# Patient Record
Sex: MALE | Race: WHITE | URBAN - METROPOLITAN AREA
[De-identification: names, ages, dates, MRNs, and addresses within clinical notes are randomized per-mention and may not be internally consistent; named-entity substitution may affect disease eponyms.]

---

## 2021-03-15 ENCOUNTER — OFFICE VISIT (OUTPATIENT)
Dept: NEUROLOGY | Age: 61
End: 2021-03-15
Payer: MEDICAID

## 2021-03-15 VITALS
OXYGEN SATURATION: 98 % | HEART RATE: 86 BPM | RESPIRATION RATE: 16 BRPM | WEIGHT: 120 LBS | BODY MASS INDEX: 18.19 KG/M2 | SYSTOLIC BLOOD PRESSURE: 140 MMHG | DIASTOLIC BLOOD PRESSURE: 90 MMHG | HEIGHT: 68 IN

## 2021-03-15 DIAGNOSIS — G62.9 PERIPHERAL POLYNEUROPATHY: Primary | ICD-10-CM

## 2021-03-15 PROCEDURE — 99204 OFFICE O/P NEW MOD 45 MIN: CPT | Performed by: NURSE PRACTITIONER

## 2021-03-15 NOTE — PATIENT INSTRUCTIONS
PRESCRIPTION REFILL POLICY [facility] Statement to Patients April 1, 2014 In an effort to ensure the large volume of patient prescription refills is processed in the most efficient and expeditious manner, we are asking our patients to assist us by calling your Pharmacy for all prescription refills, this will include also your  Mail Order Pharmacy. The pharmacy will contact our office electronically to continue the refill process. Please do not wait until the last minute to call your pharmacy. We need at least 48 hours (2days) to fill prescriptions. We also encourage you to call your pharmacy before going to  your prescription to make sure it is ready. With regard to controlled substance prescription refill requests (narcotic refills) that need to be picked up at our office, we ask your cooperation by providing us with at least 72 hours (3days) notice that you will need a refill. We will not refill narcotic prescription refill requests after 4:00pm on any weekday, Monday through Thursday, or after 2:00pm on Fridays, or on the weekends. We encourage everyone to explore another way of getting your prescription refill request processed using [employer], our patient web portal through our electronic medical record system. [employer] is an efficient and effective way to communicate your medication request directly to the office and  downloadable as an miles on your smart phone . [employer] also features a review functionality that allows you to view your medication list as well as leave messages for your physician. Are you ready to get connected? If so please review the attatched instructions or speak to any of our staff to get you set up right away! Thank you so much for your cooperation. Should you have any questions please contact our Practice Administrator. The Physicians and Staff,  [facility]

## 2021-03-15 NOTE — PROGRESS NOTES
Neurology Follow-up  Diane Moreno NP      Visit Date: March 15, 2021    Patient: Mani Walters MRN: [id number]  SSN: [id number]    YOB: 1960  Age: 61 y.o. Sex: male      PCP: None      Previous records (physician notes, laboratory reports, and radiology reports) and imaging studies were reviewed and summarized. Subjective:     HPI: Mani Walters is a 61 y.o. male presenting with about 2 months of worsening left leg pain, tingling, numbness, and pins-and-needles in his feet. His right leg is fine. He cannot think of any inciting factor such as injury. He has been a  for 14 years and works on his knees a lot. Up until a month ago he was wearing tall boots that he said probably irritated it but it has not resolved since he has been wearing sneakers. He is sleeping in a chair as when he lies flat his foot goes numb \"asleep, like when you sleep on your arm and make it go to sleep\". He does report having remote right ulnar tendinitis that gave him similar symptoms down his arm and into his right hand. He is on light duty at work. His boss is supportive. He has no medical history and does not normally see doctors unless really needed. He is a smoker. Review of systems  Review of Systems   Constitutional: Negative. HENT: Negative. Eyes: Negative. Respiratory: Negative. Cardiovascular: Negative. Gastrointestinal: Negative. Genitourinary: Negative. Musculoskeletal: Positive for myalgias. Skin: Negative. Neurological: Positive for tingling, sensory change and focal weakness. Endo/Heme/Allergies: Negative. Psychiatric/Behavioral: Negative. History reviewed. No pertinent past medical history. History reviewed. No pertinent surgical history. History reviewed. No pertinent family history.   Social History     Tobacco Use    Smoking status: Current Every Day Smoker    Smokeless tobacco: Never Used   Substance Use Topics    Alcohol use: Yes           Not on File       Objective:      Visit Vitals  BP (!) 140/90   Pulse 86   Resp 16   Ht 5' 8\" (1.727 m)   Wt 120 lb (54.4 kg)   SpO2 98%   BMI 18.25 kg/m²        General: No distress. Well groomed  Heart: Regular rate and rhythm. Lungs: Unlabored  Musculoskeletal: Extremities w/o edema or muscle wasting   Skin: Warm dry skin  Psych: Good mood and affect    Neurologic Exam:  Mental Status:  Alert and oriented x 4. Normal processing  Coherent conversation  Able to follow directions without difficulty     Language:    Normal fluency and comprehension    Cranial Nerves:   Pupils equal, round and reactive to light. Visual fields intact. Extraocular movements intact w/o nystagmus      Facial sensation intact to LT. Facial activation symmetric. Hearing intact bilaterally. No dysarthria. Shoulder shrug 5/5 bilaterally. Motor:    Bulk and tone normal.      4+/5 left plantar and dorsiflexion. Strength otherwise full in all extremities. No pronator drift. No involuntary movements, resting or intention tremor    Sensation:    Painful to touch below the knee on the left. The slightest touch causes him to jump. Relative to right side, there is diminished sensation to pinprick and light touch entirely below the knee but worse along the peroneal distribution. Vibration left great toe 3 to 4 seconds. 14 seconds on the right. Sole of left foot diminished to pinprick and light touch compared to right foot, whereas on dorsal surface much more diminished. Coordination & Gait: No ataxia with finger to nose. No Romberg. Antalgic gait favoring left foot. MMSE  No flowsheet data found.      PHQ  3 most recent PHQ Screens 3/15/2021   Little interest or pleasure in doing things Not at all   Feeling down, depressed, irritable, or hopeless Not at all   Total Score PHQ 2 0       No results found for: WBC, HCT, HGB, PLT, HCTEXT, HGBEXT, PLTEXT, HCTEXT, HGBEXT, PLTEXT    No results found for: NA, K, CL, CO2, GLU, BUN, CREA, CA    No components found for: TROPQUANT,  SGOT,  GPT,  ALT,  AP,  TBIL,  TBILI,  TP,  ALB,  GLOB,  GGT,  AML,  AMYP,  LPSE,  HLPSE    No results found for: MERCEDES    No results found for: HBA1C, HGBE8, BIY9UOQY, ORM3FMJR, UOX3RVGP     No results found for: B12LT, FOL, RBCF    No results found for: MERCEDES, ANARX, ANAIGG, XBANA    No results found for: CHOL, CHOLPOCT, CHOLX, CHLST, CHOLV, HDL, HDLPOC, HDLP, LDL, LDLCPOC, LDLC, DLDLP, VLDLC, VLDL, TGLX, TRIGL, TRIGP, TGLPOCT, CHHD, CHHDX    XR Results   No results found for this or any previous visit. CT Results:  No results found for this or any previous visit. MRI Results:  No results found for this or any previous visit. VAS/US Results (maximum last 3): No results found for this or any previous visit. TTE         EKG  No results found for this or any previous visit. Follow-up and Dispositions    · Return if symptoms worsen or fail to improve. Assessment/Plan:     Gui Fay is a 61 y.o. male presenting with about 2 months of worsening left leg pain, tingling, numbness, and pins-and-needles in his feet. His right leg is fine. He cannot think of any inciting factor such as injury. He works on his knees a lot as a . He was wearing tall boots until about a month ago that he said probably irritated it but it has not resolved since he has been wearing sneakers. He is sleeping in a chair as when he lies flat his foot goes numb. He does report having remote right ulnar tendinitis that gave him similar symptoms down his arm and into his right hand. He has no medical history and does not normally see doctors unless really needed. He is a smoker. Patient does not complain of back pain, no pain behind the knee into the thigh to palpation. No left thigh symptoms.  Laying down flat and having numb foot is more indicative of the spinal etiology but given that all symptoms are below the knee it is most likely a repetitive injury to the distal sciatic nerve behind the knee with predominance of symptoms in the peroneal nerve distribution, given present but lesser deficits in other distributions. Other etiologies will be explored if necessary pending NCS/EMG results    NCS/EMG with Dr. Nydia Rivera pending results  - Follow up soon after EMG/NCS for results and plan  - Instructed to call with questions or concerns. Visit Diagnoses    ICD-10-CM ICD-9-CM    1.  Peripheral polyneuropathy  G62.9 356.9     Unilater left           Signed By: Haylee Vogel NP     March 15, 2021 7:53 AM

## 2021-03-15 NOTE — PROGRESS NOTES
Mr. Rosanne Fragoso presents as a new patient for evaluation of left foot pain for the past couple months.

## 2021-03-16 ENCOUNTER — TELEPHONE (OUTPATIENT)
Dept: NEUROLOGY | Age: 61
End: 2021-03-16

## 2021-03-25 ENCOUNTER — OFFICE VISIT (OUTPATIENT)
Dept: NEUROLOGY | Age: 61
End: 2021-03-25
Payer: MEDICAID

## 2021-03-25 VITALS
HEIGHT: 68 IN | DIASTOLIC BLOOD PRESSURE: 88 MMHG | OXYGEN SATURATION: 98 % | SYSTOLIC BLOOD PRESSURE: 138 MMHG | BODY MASS INDEX: 18.19 KG/M2 | HEART RATE: 78 BPM | WEIGHT: 120 LBS | RESPIRATION RATE: 16 BRPM

## 2021-03-25 DIAGNOSIS — G62.9 PERIPHERAL POLYNEUROPATHY: ICD-10-CM

## 2021-03-25 DIAGNOSIS — G57.02 NEUROPATHY OF LEFT SCIATIC NERVE: ICD-10-CM

## 2021-03-25 PROCEDURE — 95910 NRV CNDJ TEST 7-8 STUDIES: CPT | Performed by: PSYCHIATRY & NEUROLOGY

## 2021-03-25 PROCEDURE — 95886 MUSC TEST DONE W/N TEST COMP: CPT | Performed by: PSYCHIATRY & NEUROLOGY

## 2021-03-25 NOTE — PROGRESS NOTES
[address] [facility]  [address]  Phone [phone] Fax [phone]  Test Date:  3/25/2021    Patient: Mookie Morley : 1960 Physician: Mindy Woodard MD   Sex: Male Height: 5' 8\" Ref Phys: Skyler Jose, NP   ID#: [id number] Weight: 120 lbs. Technician: Ana Sethi     Patient Complaints:  LLE    Patient History / Exam:  80-year-old male who is being evaluated for numbness, tingling and increased sensitivity in both lower extremities, left worse than right. He started noticing the symptoms about 1-2 months ago. Has had some pain off and on in his feet prior to that. No back pain. Denies any weakness or any significant balance issues    On exam: Alert fully oriented. Cranial nerves II through XII intact with muscle tone and bulk normal for strength normal in all extremities. DTRs 2/2. Knee jerks somewhat brisk. Toes downgoing. Sensation impaired to light touch pinprick in both lower extremities below the knees, left worse than right. Vibration sense also impaired at the ankles bilaterally. Gait steady. In the ED      NCV & EMG Findings:  Evaluation of the left peroneal motor nerve showed no response (B Fib), prolonged distal onset latency (9.9 ms), and reduced amplitude (0.3 mV). The right peroneal motor nerve showed prolonged distal onset latency (6.6 ms) and decreased conduction velocity (B Fib-Ankle, 37 m/s). The left Sup Peroneal sensory nerve showed no response (14 cm). The left sural sensory nerve showed no response (Calf). All remaining nerves  were within normal limits. All left vs. right side differences were within normal limits. All F Wave latencies were within normal limits. All F Wave left vs. right side latency differences were within normal limits. All examined muscles (as indicated in the following table) showed no evidence of electrical instability. Impression:    Left superficial peroneal and sural sensory responses were absent. Left peroneal motor response was of low amplitude and tibial motor response was nearly 50% lower amplitude compared to the right. Right peroneal motor response was mildly prolonged but tibial motor, superficial peroneal and right sural sensory responses were within normal limits. Concentric needle EMG of selected muscles of both lower extremities was unremarkable. Complex findings. The nerve conduction parameters show asymmetric involvement of the motor and sensory branches of sciatic nerve in the distal left lower extremity. However, the needle examination does not show any acute or chronic denervation in sciatic innervated muscles on the left. This set of findings is unusual for a primary sciatic neuropathy and also for a large fiber peripheral neuropathic process. Given his recent onset symptoms, sciatic neuropathy proximal to the knee would be difficult to exclude.   Further imaging in this regard may be considered and a repeat EMG/NCV and 4 to 6 weeks may provide additional detail.      ___________________________  Ko Parra MD        Nerve Conduction Studies  Anti Sensory Summary Table     Stim Site NR Peak (ms) Norm Peak (ms) P-T Amp (µV) Norm P-T Amp Onset (ms) Site1 Site2 Delta-P (ms) Dist (cm) Seb (m/s) Norm Seb (m/s)   Left Sup Peroneal Anti Sensory (Ant Lat Mall)  30°C   14 cm NR  <4.4  >5.0  14 cm Ant Lat Mall  14.0  >32   Right Sup Peroneal Anti Sensory (Ant Lat Mall)  30°C   14 cm    3.8 <4.4 6.8 >5.0 3.0 14 cm Ant Lat Mall 3.8 14.0 37 >32   Site 2    3.8  6.2  2.8         Left Sural Anti Sensory (Lat Mall)  30°C   Calf NR  <4.0  >5.0  Calf Lat Mall  14.0  >35   Right Sural Anti Sensory (Lat Mall)  30°C   Calf    3.8 <4.0 10.7 >5.0 3.3 Calf Lat Mall 3.8 14.0 37 >35   Site 2    3.9  6.2  3.3           Motor Summary Table     Stim Site NR Onset (ms) Norm Onset (ms) O-P Amp (mV) Norm O-P Amp Site1 Site2 Delta-0 (ms) Dist (cm) Seb (m/s) Norm Seb (m/s)   Left Peroneal Motor (Ext Dig Brev)  30°C   Ankle    9.9 <6.1 0.3 >2.5 B Fib Ankle  30.0  >38   B Fib NR     Poplt B Fib  10.0  >40   Poplt    17.6  0.2          Right Peroneal Motor (Ext Dig Brev)  30°C   Ankle    6.6 <6.1 3.1 >2.5 B Fib Ankle 8.1 30.0 37 >38   B Fib    14.7  2.0  Poplt B Fib 2.1 10.0 48 >40   Poplt    16.8  2.0          Left Tibial Motor (Abd Ellis Brev)  30°C   Ankle    4.0 <6.1 4.7 >3.0 Knee Ankle 10.8 40.0 37 >35   Knee    14.8  3.4          Right Tibial Motor (Abd Ellis Brev)  30°C   Ankle    3.4 <6.1 8.6 >3.0 Knee Ankle 10.0 40.0 40 >35   Knee    13.4  5.8            F Wave Studies     NR F-Lat (ms) Lat Norm (ms) L-R F-Lat (ms) L-R Lat Norm   Left Tibial (Mrkrs) (Abd Hallucis)  30°C      58.49 <61 0.00 <5.7   Right Tibial (Mrkrs) (Abd Hallucis)  30°C      58.49 <61 0.00 <5.7     EMG     Side Muscle Nerve Root Ins Act Fibs Psw Amp Dur Poly Recrt Int Pat Comment   Left AntTibialis Dp Br Peronel L4-5 Nml Nml Nml Nml Nml 0 Nml Nml    Left Peroneus Long Sup Br Peronel L5-S1 Nml Nml Nml Nml Nml 0 Nml Nml    Left Gastroc Tibial S1-2 Nml Nml Nml Nml Nml 0 Nml Nml    Left Flex Dig Long Tibial L5-S2 Nml Nml Nml Nml Nml 0 Nml Nml    Left VastusLat Femoral L2-4 Nml Nml Nml Nml Nml 0 Nml Nml    Left Lumbo Parasp Low Rami L5-S1 Nml Nml Nml         Right AntTibialis Dp Br Peronel L4-5 Nml Nml Nml Nml Nml 0 Nml Nml    Right Peroneus Long Sup Br Peronel L5-S1 Nml Nml Nml Nml Nml 0 Nml Nml    Right Gastroc Tibial S1-2 Nml Nml Nml Nml Nml 0 Nml Nml    Right Flex Dig Long Tibial L5-S2 Nml Nml Nml Nml Nml 0 Nml Nml    Right VastusLat Femoral L2-4 Nml Nml Nml Nml Nml 0 Nml Nml          Waveforms:

## 2021-03-30 ENCOUNTER — TELEPHONE (OUTPATIENT)
Dept: NEUROLOGY | Age: 61
End: 2021-03-30

## 2021-03-30 DIAGNOSIS — G57.02 NEUROPATHY OF LEFT SCIATIC NERVE: Primary | ICD-10-CM

## 2021-03-30 DIAGNOSIS — G62.9 PERIPHERAL POLYNEUROPATHY: ICD-10-CM

## 2021-03-30 NOTE — TELEPHONE ENCOUNTER
----- Message from Mookie Smith sent at 3/30/2021  2:07 PM EDT -----  Regarding: CLAUDIA Molina/Telephone  Patient return call    Caller's first and last name and relationship (if not the patient): N/A      Best contact number(s): [phone]      Whose call is being returned: N/A      Details to clarify the request: Pt is returning a phone call he believes to be regarding scheduling an appt      Mookie Smith

## 2021-03-30 NOTE — TELEPHONE ENCOUNTER
Spoke with the patient and offered him the number to central scheduling. He stated he was not sure if that's who had called him but he will call the office back if he continue to have issues.

## 2021-03-30 NOTE — PROGRESS NOTES
EMG results reviewed. This \"encounter\" to order MRI left thigh to assess for sciatic nerve injury/ compression/ lesion etc. Left VM on pt's mobile that I want to review EMG results and that I want to get MRI. Gave him office phone number. I'll try him again later.   Lit Silva NP  03/30/21  10:22 AM

## 2021-04-02 ENCOUNTER — TELEPHONE (OUTPATIENT)
Dept: NEUROLOGY | Age: 61
End: 2021-04-02

## 2021-04-02 NOTE — TELEPHONE ENCOUNTER
Call and left message confirming our wish to get MRI to evaluate sciatic nerve and to schedule appointment for that and pending results go from there.   Tomasa Guallpa NP  04/02/21  9:54 AM

## 2021-04-06 RX ORDER — GABAPENTIN 100 MG/1
CAPSULE ORAL
Qty: 120 CAP | Refills: 1 | Status: ON HOLD | OUTPATIENT
Start: 2021-04-06 | End: 2022-01-01

## 2021-04-06 NOTE — TELEPHONE ENCOUNTER
Telephoned patient. Updated pt that MRI denied until other tests performed, primarily ultrasound. He reports that he is now getting pain in his right foot, similar to his left, but not as bad. The pain is quite severe. 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 Notified pt that he will get a call about scheduling the ultrasound. Discussed trying to control the nerve pain with medication. He was reluctant but explained that gabapentin is not a opioid. Explained that gabapentin is more specific to the type of nerve pain he has. He's will to give it a try. Rx for gabapentin 100/100/200. Discussed side effects including somnolence and dizziness. Recommended trying 1 capsule at night to see how he responds and to increase cautiously. Discussed starting daytime doses on the week-end or when not getting on a roof that day until he is sure side effects don't affect his balance or alertness. He agreed.   Maryan Diaz NP  04/06/21  6:59 PM

## 2021-05-12 ENCOUNTER — HOSPITAL ENCOUNTER (EMERGENCY)
Age: 61
Discharge: HOME | End: 2021-05-12
Attending: EMERGENCY MEDICINE
Payer: COMMERCIAL

## 2021-05-12 VITALS
TEMPERATURE: 97.4 F | RESPIRATION RATE: 14 BRPM | BODY MASS INDEX: 18.19 KG/M2 | DIASTOLIC BLOOD PRESSURE: 84 MMHG | WEIGHT: 120 LBS | HEIGHT: 68 IN | SYSTOLIC BLOOD PRESSURE: 152 MMHG | OXYGEN SATURATION: 99 % | HEART RATE: 89 BPM

## 2021-05-12 DIAGNOSIS — L08.9 TOE INFECTION: Primary | ICD-10-CM

## 2021-05-12 PROCEDURE — 99281 EMR DPT VST MAYX REQ PHY/QHP: CPT

## 2021-05-12 RX ORDER — CEPHALEXIN 500 MG/1
500 CAPSULE ORAL 2 TIMES DAILY
Qty: 14 CAP | Refills: 0 | Status: SHIPPED | OUTPATIENT
Start: 2021-05-12 | End: 2021-05-12 | Stop reason: SDUPTHER

## 2021-05-12 RX ORDER — CEPHALEXIN 500 MG/1
500 CAPSULE ORAL 2 TIMES DAILY
Qty: 14 CAP | Refills: 0 | Status: SHIPPED | OUTPATIENT
Start: 2021-05-12 | End: 2021-05-19

## 2021-05-12 NOTE — ED TRIAGE NOTES
Pt arrives ambulatory to triage w/ CC L great toe pain x 2 months. Pt states he think it is infected. Redness noted to great toe. Denies injury to the area. [facility] nerve damage of the toe.

## 2021-05-12 NOTE — ED PROVIDER NOTES
51-year-old male presents from home with complaint of toe infection. Patient reports swelling and redness to his left great toe. The symptoms started over the past couple of days. He reports he has been seen by neurology for nerve pain in his left foot for the past 4 months but that the swelling and redness are new. He has been doing Epson salt soaks for the past couple days and trying to squeeze out fluid but has not had any drainage. Denies any fever. No foot pain or swelling. Does not have diabetes. Has not taken any medications for the symptoms. No past medical history on file. No past surgical history on file. No family history on file. Social History     Socioeconomic History    Marital status: SINGLE     Spouse name: Not on file    Number of children: Not on file    Years of education: Not on file    Highest education level: Not on file   Occupational History    Not on file   Social Needs    Financial resource strain: Not on file    Food insecurity     Worry: Not on file     Inability: Not on file    Transportation needs     Medical: Not on file     Non-medical: Not on file   Tobacco Use    Smoking status: Current Every Day Smoker    Smokeless tobacco: Never Used   Substance and Sexual Activity    Alcohol use:  Yes    Drug use: Not on file    Sexual activity: Not on file   Lifestyle    Physical activity     Days per week: Not on file     Minutes per session: Not on file    Stress: Not on file   Relationships    Social connections     Talks on phone: Not on file     Gets together: Not on file     Attends Yarsanism service: Not on file     Active member of club or organization: Not on file     Attends meetings of clubs or organizations: Not on file     Relationship status: Not on file    Intimate partner violence     Fear of current or ex partner: Not on file     Emotionally abused: Not on file     Physically abused: Not on file     Forced sexual activity: Not on file   Other Topics Concern    Not on file   Social History Narrative    Not on file         ALLERGIES: Patient has no known allergies. Review of Systems   Constitutional: Negative for diaphoresis and fever. HENT: Negative for facial swelling. Eyes: Negative for visual disturbance. Respiratory: Negative for cough. Cardiovascular: Negative for chest pain. Gastrointestinal: Negative for abdominal pain. Genitourinary: Negative for dysuria. Musculoskeletal: Negative for joint swelling. Skin: Negative for rash. Neurological: Negative for headaches. Hematological: Negative for adenopathy. Psychiatric/Behavioral: Negative for suicidal ideas. Vitals:    05/12/21 0730   BP: (!) 152/84   Pulse: 89   Resp: 14   Temp: 97.4 °F (36.3 °C)   SpO2: 99%   Weight: 54.4 kg (120 lb)   Height: 5' 8\" (1.727 m)            Physical Exam  Vitals signs and nursing note reviewed. Constitutional:       General: He is not in acute distress. Appearance: He is well-developed. HENT:      Head: Normocephalic and atraumatic. Eyes:      Pupils: Pupils are equal, round, and reactive to light. Neck:      Musculoskeletal: Normal range of motion and neck supple. Cardiovascular:      Rate and Rhythm: Normal rate. Pulmonary:      Effort: Pulmonary effort is normal. No respiratory distress. Abdominal:      General: There is no distension. Musculoskeletal: Normal range of motion. Comments: His left great toe shows mild erythema and swelling. This is not involved the foot or ankle. He has no areas of drainage or fluctuance. He could have an ingrown toenail that is causing some of the symptoms. Skin:     General: Skin is warm and dry. Neurological:      Mental Status: He is alert and oriented to person, place, and time. MDM  Number of Diagnoses or Management Options  Toe infection  Diagnosis management comments: Assessment: Patient likely with an ingrown toenail of his left great toe. He was some early evidence of infection present. No fever or signs of systemic disease. Stable for discharge home. Will try oral antibiotics to continue Epson salt baths. He can follow-up with podiatry. Return to the ED with any new or worsening symptoms.          Procedures

## 2022-01-01 ENCOUNTER — APPOINTMENT (OUTPATIENT)
Dept: NON INVASIVE DIAGNOSTICS | Age: 62
DRG: 166 | End: 2022-01-01
Attending: INTERNAL MEDICINE
Payer: COMMERCIAL

## 2022-01-01 ENCOUNTER — HOSPITAL ENCOUNTER (OUTPATIENT)
Dept: GENERAL RADIOLOGY | Age: 62
Discharge: HOME | End: 2022-04-25
Payer: COMMERCIAL

## 2022-01-01 ENCOUNTER — ANESTHESIA EVENT (OUTPATIENT)
Dept: SURGERY | Age: 62
DRG: 253 | End: 2022-01-01
Payer: COMMERCIAL

## 2022-01-01 ENCOUNTER — HOSPITAL ENCOUNTER (INPATIENT)
Dept: RADIATION THERAPY | Age: 62
Discharge: HOME | DRG: 166 | End: 2022-09-02
Attending: HOSPITALIST
Payer: COMMERCIAL

## 2022-01-01 ENCOUNTER — HOSPITAL ENCOUNTER (INPATIENT)
Age: 62
LOS: 2 days | End: 2022-09-08
Attending: FAMILY MEDICINE | Admitting: FAMILY MEDICINE

## 2022-01-01 ENCOUNTER — ANESTHESIA (OUTPATIENT)
Dept: SURGERY | Age: 62
DRG: 253 | End: 2022-01-01
Payer: COMMERCIAL

## 2022-01-01 ENCOUNTER — HOSPITAL ENCOUNTER (INPATIENT)
Age: 62
LOS: 3 days | Discharge: HOME | DRG: 253 | End: 2022-05-01
Payer: COMMERCIAL

## 2022-01-01 ENCOUNTER — DOCUMENTATION ONLY (OUTPATIENT)
Dept: ONCOLOGY | Age: 62
End: 2022-01-01

## 2022-01-01 ENCOUNTER — APPOINTMENT (OUTPATIENT)
Dept: GENERAL RADIOLOGY | Age: 62
DRG: 166 | End: 2022-01-01
Attending: HOSPITALIST
Payer: COMMERCIAL

## 2022-01-01 ENCOUNTER — HOSPITAL ENCOUNTER (OUTPATIENT)
Dept: PREADMISSION TESTING | Age: 62
Discharge: HOME | End: 2022-04-25
Payer: COMMERCIAL

## 2022-01-01 ENCOUNTER — APPOINTMENT (OUTPATIENT)
Dept: GENERAL RADIOLOGY | Age: 62
DRG: 166 | End: 2022-01-01
Attending: INTERNAL MEDICINE
Payer: COMMERCIAL

## 2022-01-01 ENCOUNTER — APPOINTMENT (OUTPATIENT)
Dept: GENERAL RADIOLOGY | Age: 62
DRG: 166 | End: 2022-01-01
Attending: EMERGENCY MEDICINE
Payer: COMMERCIAL

## 2022-01-01 ENCOUNTER — TELEPHONE (OUTPATIENT)
Dept: ONCOLOGY | Age: 62
End: 2022-01-01

## 2022-01-01 ENCOUNTER — HOSPICE ADMISSION (OUTPATIENT)
Dept: HOSPICE | Facility: HOSPICE | Age: 62
End: 2022-01-01
Payer: COMMERCIAL

## 2022-01-01 ENCOUNTER — APPOINTMENT (OUTPATIENT)
Dept: CT IMAGING | Age: 62
DRG: 166 | End: 2022-01-01
Attending: EMERGENCY MEDICINE
Payer: COMMERCIAL

## 2022-01-01 ENCOUNTER — APPOINTMENT (OUTPATIENT)
Dept: GENERAL RADIOLOGY | Age: 62
DRG: 253 | End: 2022-01-01
Payer: COMMERCIAL

## 2022-01-01 ENCOUNTER — HOSPITAL ENCOUNTER (INPATIENT)
Age: 62
LOS: 10 days | Discharge: HOSPICE | DRG: 166 | End: 2022-09-06
Attending: EMERGENCY MEDICINE | Admitting: INTERNAL MEDICINE
Payer: COMMERCIAL

## 2022-01-01 ENCOUNTER — HOSPITAL ENCOUNTER (INPATIENT)
Dept: RADIATION THERAPY | Age: 62
Discharge: HOME | DRG: 166 | End: 2022-08-31
Attending: RADIOLOGY
Payer: COMMERCIAL

## 2022-01-01 ENCOUNTER — HOSPITAL ENCOUNTER (INPATIENT)
Dept: RADIATION THERAPY | Age: 62
Discharge: HOME | DRG: 166 | End: 2022-08-30
Attending: RADIOLOGY
Payer: COMMERCIAL

## 2022-01-01 ENCOUNTER — HOSPITAL ENCOUNTER (INPATIENT)
Dept: RADIATION THERAPY | Age: 62
Discharge: HOME | DRG: 166 | End: 2022-08-29
Payer: COMMERCIAL

## 2022-01-01 ENCOUNTER — HOSPITAL ENCOUNTER (INPATIENT)
Dept: CT IMAGING | Age: 62
Discharge: HOME | DRG: 166 | End: 2022-08-29
Attending: INTERNAL MEDICINE
Payer: COMMERCIAL

## 2022-01-01 ENCOUNTER — APPOINTMENT (OUTPATIENT)
Dept: CT IMAGING | Age: 62
DRG: 166 | End: 2022-01-01
Attending: RADIOLOGY
Payer: COMMERCIAL

## 2022-01-01 ENCOUNTER — APPOINTMENT (OUTPATIENT)
Dept: ULTRASOUND IMAGING | Age: 62
DRG: 166 | End: 2022-01-01
Attending: PHYSICIAN ASSISTANT
Payer: COMMERCIAL

## 2022-01-01 ENCOUNTER — HOSPITAL ENCOUNTER (INPATIENT)
Dept: RADIATION THERAPY | Age: 62
Discharge: HOME | DRG: 166 | End: 2022-09-01
Attending: PHYSICIAN ASSISTANT
Payer: COMMERCIAL

## 2022-01-01 ENCOUNTER — APPOINTMENT (OUTPATIENT)
Dept: ULTRASOUND IMAGING | Age: 62
DRG: 166 | End: 2022-01-01
Attending: INTERNAL MEDICINE
Payer: COMMERCIAL

## 2022-01-01 VITALS
DIASTOLIC BLOOD PRESSURE: 89 MMHG | WEIGHT: 115.52 LBS | SYSTOLIC BLOOD PRESSURE: 131 MMHG | HEART RATE: 96 BPM | BODY MASS INDEX: 17.51 KG/M2 | TEMPERATURE: 97.8 F | HEIGHT: 68 IN | OXYGEN SATURATION: 95 % | RESPIRATION RATE: 16 BRPM

## 2022-01-01 VITALS
BODY MASS INDEX: 15.46 KG/M2 | DIASTOLIC BLOOD PRESSURE: 65 MMHG | WEIGHT: 102 LBS | TEMPERATURE: 97.9 F | OXYGEN SATURATION: 96 % | HEART RATE: 113 BPM | HEIGHT: 68 IN | SYSTOLIC BLOOD PRESSURE: 103 MMHG | RESPIRATION RATE: 14 BRPM

## 2022-01-01 VITALS
HEART RATE: 78 BPM | DIASTOLIC BLOOD PRESSURE: 78 MMHG | HEIGHT: 68 IN | BODY MASS INDEX: 17.51 KG/M2 | TEMPERATURE: 98.4 F | WEIGHT: 115.52 LBS | SYSTOLIC BLOOD PRESSURE: 139 MMHG

## 2022-01-01 VITALS
RESPIRATION RATE: 24 BRPM | DIASTOLIC BLOOD PRESSURE: 58 MMHG | HEART RATE: 122 BPM | SYSTOLIC BLOOD PRESSURE: 90 MMHG | TEMPERATURE: 98.9 F | OXYGEN SATURATION: 77 %

## 2022-01-01 DIAGNOSIS — C79.51 BONE METASTASES (HCC): ICD-10-CM

## 2022-01-01 DIAGNOSIS — Z51.5 HOSPICE CARE: ICD-10-CM

## 2022-01-01 DIAGNOSIS — R06.02 SHORTNESS OF BREATH: ICD-10-CM

## 2022-01-01 DIAGNOSIS — C34.91 PRIMARY MALIGNANT NEOPLASM OF RIGHT LUNG METASTATIC TO OTHER SITE (HCC): ICD-10-CM

## 2022-01-01 DIAGNOSIS — I70.219 ATHEROSCLEROTIC PVD WITH INTERMITTENT CLAUDICATION (HCC): Primary | ICD-10-CM

## 2022-01-01 DIAGNOSIS — M89.8X9 BONE PAIN: ICD-10-CM

## 2022-01-01 DIAGNOSIS — C34.90 STAGE 4 MALIGNANT NEOPLASM OF LUNG, UNSPECIFIED LATERALITY (HCC): ICD-10-CM

## 2022-01-01 DIAGNOSIS — G89.3 CANCER ASSOCIATED PAIN: Primary | ICD-10-CM

## 2022-01-01 DIAGNOSIS — I21.4 NSTEMI (NON-ST ELEVATED MYOCARDIAL INFARCTION) (HCC): Primary | ICD-10-CM

## 2022-01-01 DIAGNOSIS — J90 PLEURAL EFFUSION: ICD-10-CM

## 2022-01-01 DIAGNOSIS — R91.8 LUNG MASS: ICD-10-CM

## 2022-01-01 DIAGNOSIS — R63.4 WEIGHT LOSS: ICD-10-CM

## 2022-01-01 DIAGNOSIS — Z71.89 ADVANCE CARE PLANNING: ICD-10-CM

## 2022-01-01 DIAGNOSIS — R45.1 RESTLESSNESS: ICD-10-CM

## 2022-01-01 DIAGNOSIS — M25.512 ACUTE PAIN OF LEFT SHOULDER: ICD-10-CM

## 2022-01-01 DIAGNOSIS — G89.3 CANCER ASSOCIATED PAIN: ICD-10-CM

## 2022-01-01 LAB
ABO + RH BLD: NORMAL
ALBUMIN SERPL-MCNC: 2.7 G/DL (ref 3.5–5)
ALBUMIN SERPL-MCNC: 3.7 G/DL (ref 3.5–5)
ALBUMIN/GLOB SERPL: 0.5 {RATIO} (ref 1.1–2.2)
ALBUMIN/GLOB SERPL: 1.1 {RATIO} (ref 1.1–2.2)
ALP SERPL-CCNC: 128 U/L (ref 45–117)
ALP SERPL-CCNC: 79 U/L (ref 45–117)
ALT SERPL-CCNC: 18 U/L (ref 12–78)
ALT SERPL-CCNC: 20 U/L (ref 12–78)
ANION GAP SERPL CALC-SCNC: 6 MMOL/L (ref 5–15)
ANION GAP SERPL CALC-SCNC: 7 MMOL/L (ref 5–15)
ANION GAP SERPL CALC-SCNC: 7 MMOL/L (ref 5–15)
ANION GAP SERPL CALC-SCNC: 8 MMOL/L (ref 5–15)
ANION GAP SERPL CALC-SCNC: 8 MMOL/L (ref 5–15)
APPEARANCE FLD: ABNORMAL
APTT PPP: 30 SEC (ref 22.1–31)
APTT PPP: 35.3 SEC (ref 22.1–31)
APTT PPP: 35.4 SEC (ref 22.1–31)
AST SERPL-CCNC: 29 U/L (ref 15–37)
AST SERPL-CCNC: 34 U/L (ref 15–37)
ATRIAL RATE: 108 BPM
ATRIAL RATE: 67 BPM
ATRIAL RATE: 96 BPM
BACTERIA SPEC CULT: NORMAL
BASOPHILS # BLD: 0 K/UL (ref 0–0.1)
BASOPHILS # BLD: 0 K/UL (ref 0–0.1)
BASOPHILS # BLD: 0.1 K/UL (ref 0–0.1)
BASOPHILS NFR BLD: 0 % (ref 0–1)
BASOPHILS NFR BLD: 1 % (ref 0–1)
BASOPHILS NFR BLD: 1 % (ref 0–1)
BILIRUB SERPL-MCNC: 0.4 MG/DL (ref 0.2–1)
BILIRUB SERPL-MCNC: 0.4 MG/DL (ref 0.2–1)
BLOOD GROUP ANTIBODIES SERPL: NORMAL
BUN SERPL-MCNC: 12 MG/DL (ref 6–20)
BUN SERPL-MCNC: 14 MG/DL (ref 6–20)
BUN/CREAT SERPL: 18 (ref 12–20)
BUN/CREAT SERPL: 21 (ref 12–20)
BUN/CREAT SERPL: 24 (ref 12–20)
BUN/CREAT SERPL: 25 (ref 12–20)
BUN/CREAT SERPL: 29 (ref 12–20)
CALCIUM SERPL-MCNC: 8.3 MG/DL (ref 8.5–10.1)
CALCIUM SERPL-MCNC: 8.5 MG/DL (ref 8.5–10.1)
CALCIUM SERPL-MCNC: 8.7 MG/DL (ref 8.5–10.1)
CALCIUM SERPL-MCNC: 9.2 MG/DL (ref 8.5–10.1)
CALCIUM SERPL-MCNC: 9.3 MG/DL (ref 8.5–10.1)
CALCULATED P AXIS, ECG09: 53 DEGREES
CALCULATED P AXIS, ECG09: 70 DEGREES
CALCULATED P AXIS, ECG09: 76 DEGREES
CALCULATED R AXIS, ECG10: 50 DEGREES
CALCULATED R AXIS, ECG10: 57 DEGREES
CALCULATED R AXIS, ECG10: 80 DEGREES
CALCULATED T AXIS, ECG11: 48 DEGREES
CALCULATED T AXIS, ECG11: 51 DEGREES
CALCULATED T AXIS, ECG11: 72 DEGREES
CHLORIDE SERPL-SCNC: 100 MMOL/L (ref 97–108)
CHLORIDE SERPL-SCNC: 102 MMOL/L (ref 97–108)
CHLORIDE SERPL-SCNC: 104 MMOL/L (ref 97–108)
CHLORIDE SERPL-SCNC: 98 MMOL/L (ref 97–108)
CHLORIDE SERPL-SCNC: 99 MMOL/L (ref 97–108)
CO2 SERPL-SCNC: 24 MMOL/L (ref 21–32)
CO2 SERPL-SCNC: 26 MMOL/L (ref 21–32)
CO2 SERPL-SCNC: 26 MMOL/L (ref 21–32)
CO2 SERPL-SCNC: 27 MMOL/L (ref 21–32)
CO2 SERPL-SCNC: 27 MMOL/L (ref 21–32)
COLOR FLD: ABNORMAL
COMMENT, HOLDF: NORMAL
COMMENT, HOLDF: NORMAL
COVID-19 RAPID TEST, COVR: ABNORMAL
COVID-19 RAPID TEST, COVR: NOT DETECTED
COVID-19 RAPID TEST, COVR: NOT DETECTED
CREAT SERPL-MCNC: 0.42 MG/DL (ref 0.7–1.3)
CREAT SERPL-MCNC: 0.48 MG/DL (ref 0.7–1.3)
CREAT SERPL-MCNC: 0.58 MG/DL (ref 0.7–1.3)
CREAT SERPL-MCNC: 0.59 MG/DL (ref 0.7–1.3)
CREAT SERPL-MCNC: 0.67 MG/DL (ref 0.7–1.3)
DIAGNOSIS, 93000: NORMAL
DIFFERENTIAL METHOD BLD: ABNORMAL
ECHO AO ROOT DIAM: 3.5 CM
ECHO AO ROOT INDEX: 2.27 CM/M2
ECHO AV AREA PEAK VELOCITY: 2.5 CM2
ECHO AV AREA/BSA PEAK VELOCITY: 1.6 CM2/M2
ECHO AV PEAK GRADIENT: 8 MMHG
ECHO AV PEAK VELOCITY: 1.5 M/S
ECHO AV VELOCITY RATIO: 0.73
ECHO EST RA PRESSURE: 3 MMHG
ECHO LA DIAMETER INDEX: 2.27 CM/M2
ECHO LA DIAMETER: 3.5 CM
ECHO LA TO AORTIC ROOT RATIO: 1
ECHO LV E' LATERAL VELOCITY: 13 CM/S
ECHO LV E' SEPTAL VELOCITY: 10 CM/S
ECHO LV FRACTIONAL SHORTENING: 31 % (ref 28–44)
ECHO LV INTERNAL DIMENSION DIASTOLE INDEX: 2.92 CM/M2
ECHO LV INTERNAL DIMENSION DIASTOLIC: 4.5 CM (ref 4.2–5.9)
ECHO LV INTERNAL DIMENSION SYSTOLIC INDEX: 2.01 CM/M2
ECHO LV INTERNAL DIMENSION SYSTOLIC: 3.1 CM
ECHO LV IVSD: 0.7 CM (ref 0.6–1)
ECHO LV MASS 2D: 113.6 G (ref 88–224)
ECHO LV MASS INDEX 2D: 73.8 G/M2 (ref 49–115)
ECHO LV POSTERIOR WALL DIASTOLIC: 0.9 CM (ref 0.6–1)
ECHO LV RELATIVE WALL THICKNESS RATIO: 0.4
ECHO LVOT AREA: 3.5 CM2
ECHO LVOT DIAM: 2.1 CM
ECHO LVOT PEAK GRADIENT: 5 MMHG
ECHO LVOT PEAK VELOCITY: 1.1 M/S
ECHO MV A VELOCITY: 0.57 M/S
ECHO MV AREA PHT: 2.2 CM2
ECHO MV E DECELERATION TIME (DT): 340.1 MS
ECHO MV E VELOCITY: 0.55 M/S
ECHO MV E/A RATIO: 0.96
ECHO MV E/E' LATERAL: 4.23
ECHO MV E/E' RATIO (AVERAGED): 4.87
ECHO MV E/E' SEPTAL: 5.5
ECHO MV PRESSURE HALF TIME (PHT): 98.6 MS
ECHO PV MAX VELOCITY: 0.7 M/S
ECHO PV PEAK GRADIENT: 2 MMHG
ECHO RIGHT VENTRICULAR SYSTOLIC PRESSURE (RVSP): 27 MMHG
ECHO RV FREE WALL PEAK S': 14 CM/S
ECHO RV INTERNAL DIMENSION: 3.9 CM
ECHO RV TAPSE: 1.7 CM (ref 1.7–?)
ECHO TV REGURGITANT MAX VELOCITY: 2.43 M/S
ECHO TV REGURGITANT PEAK GRADIENT: 24 MMHG
EOSINOPHIL # BLD: 0 K/UL (ref 0–0.4)
EOSINOPHIL # BLD: 0.1 K/UL (ref 0–0.4)
EOSINOPHIL # BLD: 0.1 K/UL (ref 0–0.4)
EOSINOPHIL # BLD: 0.2 K/UL (ref 0–0.4)
EOSINOPHIL NFR BLD: 0 % (ref 0–7)
EOSINOPHIL NFR BLD: 0 % (ref 0–7)
EOSINOPHIL NFR BLD: 1 % (ref 0–7)
EOSINOPHIL NFR BLD: 2 % (ref 0–7)
ERYTHROCYTE [DISTWIDTH] IN BLOOD BY AUTOMATED COUNT: 13.9 % (ref 11.5–14.5)
ERYTHROCYTE [DISTWIDTH] IN BLOOD BY AUTOMATED COUNT: 14.6 % (ref 11.5–14.5)
ERYTHROCYTE [DISTWIDTH] IN BLOOD BY AUTOMATED COUNT: 14.8 % (ref 11.5–14.5)
ERYTHROCYTE [DISTWIDTH] IN BLOOD BY AUTOMATED COUNT: 14.8 % (ref 11.5–14.5)
ERYTHROCYTE [DISTWIDTH] IN BLOOD BY AUTOMATED COUNT: 15.1 % (ref 11.5–14.5)
ERYTHROCYTE [DISTWIDTH] IN BLOOD BY AUTOMATED COUNT: 15.2 % (ref 11.5–14.5)
FERRITIN SERPL-MCNC: 1008 NG/ML (ref 26–388)
FOLATE SERPL-MCNC: 11.1 NG/ML (ref 5–21)
GLOBULIN SER CALC-MCNC: 3.4 G/DL (ref 2–4)
GLOBULIN SER CALC-MCNC: 5 G/DL (ref 2–4)
GLUCOSE BLD STRIP.AUTO-MCNC: 98 MG/DL (ref 65–117)
GLUCOSE FLD-MCNC: 70 MG/DL
GLUCOSE SERPL-MCNC: 120 MG/DL (ref 65–100)
GLUCOSE SERPL-MCNC: 84 MG/DL (ref 65–100)
GLUCOSE SERPL-MCNC: 95 MG/DL (ref 65–100)
GLUCOSE SERPL-MCNC: 98 MG/DL (ref 65–100)
GLUCOSE SERPL-MCNC: 99 MG/DL (ref 65–100)
GRAM STN SPEC: NORMAL
GRAM STN SPEC: NORMAL
HCT VFR BLD AUTO: 26.5 % (ref 36.6–50.3)
HCT VFR BLD AUTO: 27.1 % (ref 36.6–50.3)
HCT VFR BLD AUTO: 28.1 % (ref 36.6–50.3)
HCT VFR BLD AUTO: 29.4 % (ref 36.6–50.3)
HCT VFR BLD AUTO: 31.1 % (ref 36.6–50.3)
HCT VFR BLD AUTO: 44.8 % (ref 36.6–50.3)
HGB BLD-MCNC: 10.7 G/DL (ref 12.1–17)
HGB BLD-MCNC: 14.9 G/DL (ref 12.1–17)
HGB BLD-MCNC: 8.8 G/DL (ref 12.1–17)
HGB BLD-MCNC: 9.2 G/DL (ref 12.1–17)
HGB BLD-MCNC: 9.4 G/DL (ref 12.1–17)
HGB BLD-MCNC: 9.8 G/DL (ref 12.1–17)
IMM GRANULOCYTES # BLD AUTO: 0 K/UL (ref 0–0.04)
IMM GRANULOCYTES # BLD AUTO: 0.2 K/UL (ref 0–0.04)
IMM GRANULOCYTES NFR BLD AUTO: 0 % (ref 0–0.5)
IMM GRANULOCYTES NFR BLD AUTO: 1 % (ref 0–0.5)
INR PPP: 1 (ref 0.9–1.1)
IRON SATN MFR SERPL: 17 % (ref 20–50)
IRON SERPL-MCNC: 34 UG/DL (ref 35–150)
LDH SERPL L TO P-CCNC: 1851 IU/L
LDH SERPL L TO P-CCNC: 2005 U/L (ref 85–241)
LYMPHOCYTES # BLD: 1.9 K/UL (ref 0.8–3.5)
LYMPHOCYTES # BLD: 2 K/UL (ref 0.8–3.5)
LYMPHOCYTES # BLD: 2 K/UL (ref 0.8–3.5)
LYMPHOCYTES # BLD: 2.1 K/UL (ref 0.8–3.5)
LYMPHOCYTES # BLD: 2.1 K/UL (ref 0.8–3.5)
LYMPHOCYTES # BLD: 2.4 K/UL (ref 0.8–3.5)
LYMPHOCYTES NFR BLD: 10 % (ref 12–49)
LYMPHOCYTES NFR BLD: 12 % (ref 12–49)
LYMPHOCYTES NFR BLD: 13 % (ref 12–49)
LYMPHOCYTES NFR BLD: 13 % (ref 12–49)
LYMPHOCYTES NFR BLD: 14 % (ref 12–49)
LYMPHOCYTES NFR BLD: 21 % (ref 12–49)
LYMPHOCYTES NFR FLD: 60 %
MAGNESIUM SERPL-MCNC: 1.7 MG/DL (ref 1.6–2.4)
MAGNESIUM SERPL-MCNC: 1.9 MG/DL (ref 1.6–2.4)
MCH RBC QN AUTO: 32.1 PG (ref 26–34)
MCH RBC QN AUTO: 32.1 PG (ref 26–34)
MCH RBC QN AUTO: 32.3 PG (ref 26–34)
MCH RBC QN AUTO: 32.3 PG (ref 26–34)
MCH RBC QN AUTO: 32.7 PG (ref 26–34)
MCH RBC QN AUTO: 32.7 PG (ref 26–34)
MCHC RBC AUTO-ENTMCNC: 33.2 G/DL (ref 30–36.5)
MCHC RBC AUTO-ENTMCNC: 33.3 G/DL (ref 30–36.5)
MCHC RBC AUTO-ENTMCNC: 33.3 G/DL (ref 30–36.5)
MCHC RBC AUTO-ENTMCNC: 33.5 G/DL (ref 30–36.5)
MCHC RBC AUTO-ENTMCNC: 33.9 G/DL (ref 30–36.5)
MCHC RBC AUTO-ENTMCNC: 34.4 G/DL (ref 30–36.5)
MCV RBC AUTO: 94 FL (ref 80–99)
MCV RBC AUTO: 95.9 FL (ref 80–99)
MCV RBC AUTO: 96.4 FL (ref 80–99)
MCV RBC AUTO: 96.7 FL (ref 80–99)
MCV RBC AUTO: 97 FL (ref 80–99)
MCV RBC AUTO: 98.5 FL (ref 80–99)
MESOTHL CELL NFR FLD: 3 %
MONOCYTES # BLD: 0.9 K/UL (ref 0–1)
MONOCYTES # BLD: 1.1 K/UL (ref 0–1)
MONOCYTES # BLD: 1.2 K/UL (ref 0–1)
MONOCYTES # BLD: 1.3 K/UL (ref 0–1)
MONOCYTES # BLD: 1.4 K/UL (ref 0–1)
MONOCYTES # BLD: 1.4 K/UL (ref 0–1)
MONOCYTES NFR BLD: 10 % (ref 5–13)
MONOCYTES NFR BLD: 10 % (ref 5–13)
MONOCYTES NFR BLD: 5 % (ref 5–13)
MONOCYTES NFR BLD: 6 % (ref 5–13)
MONOCYTES NFR BLD: 9 % (ref 5–13)
MONOCYTES NFR BLD: 9 % (ref 5–13)
MONOS+MACROS NFR FLD: 10 %
NEUTROPHILS NFR FLD: 27 %
NEUTS SEG # BLD: 10.6 K/UL (ref 1.8–8)
NEUTS SEG # BLD: 10.9 K/UL (ref 1.8–8)
NEUTS SEG # BLD: 11.9 K/UL (ref 1.8–8)
NEUTS SEG # BLD: 14.8 K/UL (ref 1.8–8)
NEUTS SEG # BLD: 16 K/UL (ref 1.8–8)
NEUTS SEG # BLD: 7.5 K/UL (ref 1.8–8)
NEUTS SEG NFR BLD: 66 % (ref 32–75)
NEUTS SEG NFR BLD: 74 % (ref 32–75)
NEUTS SEG NFR BLD: 75 % (ref 32–75)
NEUTS SEG NFR BLD: 76 % (ref 32–75)
NEUTS SEG NFR BLD: 81 % (ref 32–75)
NEUTS SEG NFR BLD: 84 % (ref 32–75)
NRBC # BLD: 0 K/UL (ref 0–0.01)
NRBC BLD-RTO: 0 PER 100 WBC
NUC CELL # FLD: 1101 /CU MM
P-R INTERVAL, ECG05: 132 MS
P-R INTERVAL, ECG05: 134 MS
P-R INTERVAL, ECG05: 136 MS
PHOSPHATE SERPL-MCNC: 3.9 MG/DL (ref 2.6–4.7)
PHOSPHATE SERPL-MCNC: 3.9 MG/DL (ref 2.6–4.7)
PHOSPHATE SERPL-MCNC: 4.3 MG/DL (ref 2.6–4.7)
PLATELET # BLD AUTO: 340 K/UL (ref 150–400)
PLATELET # BLD AUTO: 406 K/UL (ref 150–400)
PLATELET # BLD AUTO: 408 K/UL (ref 150–400)
PLATELET # BLD AUTO: 415 K/UL (ref 150–400)
PLATELET # BLD AUTO: 469 K/UL (ref 150–400)
PLATELET # BLD AUTO: 493 K/UL (ref 150–400)
PMV BLD AUTO: 9 FL (ref 8.9–12.9)
PMV BLD AUTO: 9.6 FL (ref 8.9–12.9)
PMV BLD AUTO: 9.7 FL (ref 8.9–12.9)
PMV BLD AUTO: 9.8 FL (ref 8.9–12.9)
PMV BLD AUTO: 9.9 FL (ref 8.9–12.9)
PMV BLD AUTO: 9.9 FL (ref 8.9–12.9)
POTASSIUM SERPL-SCNC: 4 MMOL/L (ref 3.5–5.1)
POTASSIUM SERPL-SCNC: 4.2 MMOL/L (ref 3.5–5.1)
POTASSIUM SERPL-SCNC: 4.4 MMOL/L (ref 3.5–5.1)
POTASSIUM SERPL-SCNC: 4.5 MMOL/L (ref 3.5–5.1)
POTASSIUM SERPL-SCNC: 4.5 MMOL/L (ref 3.5–5.1)
PROCALCITONIN SERPL-MCNC: <0.05 NG/ML
PROT FLD-MCNC: 3.8 G/DL
PROT SERPL-MCNC: 6.4 G/DL (ref 6.4–8.2)
PROT SERPL-MCNC: 7.1 G/DL (ref 6.4–8.2)
PROT SERPL-MCNC: 7.7 G/DL (ref 6.4–8.2)
PROTHROMBIN TIME: 10.1 SEC (ref 9–11.1)
Q-T INTERVAL, ECG07: 336 MS
Q-T INTERVAL, ECG07: 364 MS
Q-T INTERVAL, ECG07: 390 MS
QRS DURATION, ECG06: 100 MS
QRS DURATION, ECG06: 108 MS
QRS DURATION, ECG06: 108 MS
QTC CALCULATION (BEZET), ECG08: 412 MS
QTC CALCULATION (BEZET), ECG08: 450 MS
QTC CALCULATION (BEZET), ECG08: 459 MS
RBC # BLD AUTO: 2.74 M/UL (ref 4.1–5.7)
RBC # BLD AUTO: 2.81 M/UL (ref 4.1–5.7)
RBC # BLD AUTO: 2.93 M/UL (ref 4.1–5.7)
RBC # BLD AUTO: 3.03 M/UL (ref 4.1–5.7)
RBC # BLD AUTO: 3.31 M/UL (ref 4.1–5.7)
RBC # BLD AUTO: 4.55 M/UL (ref 4.1–5.7)
RBC # FLD: >100 /CU MM
SAMPLES BEING HELD,HOLD: NORMAL
SAMPLES BEING HELD,HOLD: NORMAL
SERVICE CMNT-IMP: NORMAL
SERVICE CMNT-IMP: NORMAL
SODIUM SERPL-SCNC: 131 MMOL/L (ref 136–145)
SODIUM SERPL-SCNC: 132 MMOL/L (ref 136–145)
SODIUM SERPL-SCNC: 133 MMOL/L (ref 136–145)
SODIUM SERPL-SCNC: 136 MMOL/L (ref 136–145)
SODIUM SERPL-SCNC: 137 MMOL/L (ref 136–145)
SOURCE, COVRS: ABNORMAL
SOURCE, COVRS: NORMAL
SOURCE, COVRS: NORMAL
SPECIMEN EXP DATE BLD: NORMAL
SPECIMEN SOURCE FLD: ABNORMAL
SPECIMEN SOURCE FLD: NORMAL
SPECIMEN SOURCE FLD: NORMAL
SPECIMEN SOURCE: NORMAL
THERAPEUTIC RANGE,PTTT: ABNORMAL SECS (ref 58–77)
THERAPEUTIC RANGE,PTTT: ABNORMAL SECS (ref 58–77)
THERAPEUTIC RANGE,PTTT: NORMAL SECS (ref 58–77)
TIBC SERPL-MCNC: 202 UG/DL (ref 250–450)
TROPONIN-HIGH SENSITIVITY: 123 NG/L (ref 0–76)
TROPONIN-HIGH SENSITIVITY: 123 NG/L (ref 0–76)
TROPONIN-HIGH SENSITIVITY: 88 NG/L (ref 0–76)
TSH SERPL DL<=0.05 MIU/L-ACNC: 1.59 UIU/ML (ref 0.36–3.74)
VENTRICULAR RATE, ECG03: 108 BPM
VENTRICULAR RATE, ECG03: 67 BPM
VENTRICULAR RATE, ECG03: 96 BPM
VIT B12 SERPL-MCNC: 1525 PG/ML (ref 193–986)
WBC # BLD AUTO: 11.5 K/UL (ref 4.1–11.1)
WBC # BLD AUTO: 14.4 K/UL (ref 4.1–11.1)
WBC # BLD AUTO: 14.5 K/UL (ref 4.1–11.1)
WBC # BLD AUTO: 15.8 K/UL (ref 4.1–11.1)
WBC # BLD AUTO: 18.2 K/UL (ref 4.1–11.1)
WBC # BLD AUTO: 19 K/UL (ref 4.1–11.1)

## 2022-01-01 PROCEDURE — 74011250636 HC RX REV CODE- 250/636: Performed by: INTERNAL MEDICINE

## 2022-01-01 PROCEDURE — 97530 THERAPEUTIC ACTIVITIES: CPT

## 2022-01-01 PROCEDURE — 74011250636 HC RX REV CODE- 250/636: Performed by: FAMILY MEDICINE

## 2022-01-01 PROCEDURE — 65270000029 HC RM PRIVATE

## 2022-01-01 PROCEDURE — 83735 ASSAY OF MAGNESIUM: CPT

## 2022-01-01 PROCEDURE — 0656 HSPC GENERAL INPATIENT

## 2022-01-01 PROCEDURE — 74011000250 HC RX REV CODE- 250: Performed by: INTERNAL MEDICINE

## 2022-01-01 PROCEDURE — 93005 ELECTROCARDIOGRAM TRACING: CPT

## 2022-01-01 PROCEDURE — 74011250637 HC RX REV CODE- 250/637: Performed by: FAMILY MEDICINE

## 2022-01-01 PROCEDURE — 77030002987 HC SUT PROL J&J -B

## 2022-01-01 PROCEDURE — 99285 EMERGENCY DEPT VISIT HI MDM: CPT

## 2022-01-01 PROCEDURE — 88341 IMHCHEM/IMCYTCHM EA ADD ANTB: CPT

## 2022-01-01 PROCEDURE — 77387 GUIDANCE FOR RADJ TX DLVR: CPT

## 2022-01-01 PROCEDURE — 85025 COMPLETE CBC W/AUTO DIFF WBC: CPT

## 2022-01-01 PROCEDURE — 71275 CT ANGIOGRAPHY CHEST: CPT

## 2022-01-01 PROCEDURE — 74011250637 HC RX REV CODE- 250/637: Performed by: INTERNAL MEDICINE

## 2022-01-01 PROCEDURE — 36415 COLL VENOUS BLD VENIPUNCTURE: CPT

## 2022-01-01 PROCEDURE — 94760 N-INVAS EAR/PLS OXIMETRY 1: CPT

## 2022-01-01 PROCEDURE — 74011250637 HC RX REV CODE- 250/637

## 2022-01-01 PROCEDURE — 77412 RADIATION TX DELIVERY LVL 3: CPT

## 2022-01-01 PROCEDURE — 74011250636 HC RX REV CODE- 250/636: Performed by: PHYSICAL MEDICINE & REHABILITATION

## 2022-01-01 PROCEDURE — 74011000250 HC RX REV CODE- 250

## 2022-01-01 PROCEDURE — 74011250637 HC RX REV CODE- 250/637: Performed by: PHYSICAL MEDICINE & REHABILITATION

## 2022-01-01 PROCEDURE — 80048 BASIC METABOLIC PNL TOTAL CA: CPT

## 2022-01-01 PROCEDURE — 82607 VITAMIN B-12: CPT

## 2022-01-01 PROCEDURE — 71045 X-RAY EXAM CHEST 1 VIEW: CPT

## 2022-01-01 PROCEDURE — 84484 ASSAY OF TROPONIN QUANT: CPT

## 2022-01-01 PROCEDURE — 86900 BLOOD TYPING SEROLOGIC ABO: CPT

## 2022-01-01 PROCEDURE — 89050 BODY FLUID CELL COUNT: CPT

## 2022-01-01 PROCEDURE — 74011000250 HC RX REV CODE- 250: Performed by: ANESTHESIOLOGY

## 2022-01-01 PROCEDURE — 041L09M BYPASS LEFT FEMORAL ARTERY TO PERONEAL ARTERY WITH AUTOLOGOUS VENOUS TISSUE, OPEN APPROACH: ICD-10-PCS

## 2022-01-01 PROCEDURE — 99223 1ST HOSP IP/OBS HIGH 75: CPT | Performed by: PHYSICAL MEDICINE & REHABILITATION

## 2022-01-01 PROCEDURE — 94640 AIRWAY INHALATION TREATMENT: CPT

## 2022-01-01 PROCEDURE — 2709999900 HC NON-CHARGEABLE SUPPLY

## 2022-01-01 PROCEDURE — 97165 OT EVAL LOW COMPLEX 30 MIN: CPT

## 2022-01-01 PROCEDURE — 82746 ASSAY OF FOLIC ACID SERUM: CPT

## 2022-01-01 PROCEDURE — 88342 IMHCHEM/IMCYTCHM 1ST ANTB: CPT

## 2022-01-01 PROCEDURE — 74011000250 HC RX REV CODE- 250: Performed by: PHYSICIAN ASSISTANT

## 2022-01-01 PROCEDURE — 88305 TISSUE EXAM BY PATHOLOGIST: CPT

## 2022-01-01 PROCEDURE — 65270000046 HC RM TELEMETRY

## 2022-01-01 PROCEDURE — 99233 SBSQ HOSP IP/OBS HIGH 50: CPT | Performed by: PHYSICAL MEDICINE & REHABILITATION

## 2022-01-01 PROCEDURE — 76060000036 HC ANESTHESIA 2.5 TO 3 HR

## 2022-01-01 PROCEDURE — 0W9930Z DRAINAGE OF RIGHT PLEURAL CAVITY WITH DRAINAGE DEVICE, PERCUTANEOUS APPROACH: ICD-10-PCS | Performed by: RADIOLOGY

## 2022-01-01 PROCEDURE — 99233 SBSQ HOSP IP/OBS HIGH 50: CPT | Performed by: INTERNAL MEDICINE

## 2022-01-01 PROCEDURE — 71046 X-RAY EXAM CHEST 2 VIEWS: CPT

## 2022-01-01 PROCEDURE — 87635 SARS-COV-2 COVID-19 AMP PRB: CPT

## 2022-01-01 PROCEDURE — 74011250637 HC RX REV CODE- 250/637: Performed by: STUDENT IN AN ORGANIZED HEALTH CARE EDUCATION/TRAINING PROGRAM

## 2022-01-01 PROCEDURE — 74011250636 HC RX REV CODE- 250/636

## 2022-01-01 PROCEDURE — 88112 CYTOPATH CELL ENHANCE TECH: CPT

## 2022-01-01 PROCEDURE — 74011000250 HC RX REV CODE- 250: Performed by: NURSE ANESTHETIST, CERTIFIED REGISTERED

## 2022-01-01 PROCEDURE — 77010033678 HC OXYGEN DAILY

## 2022-01-01 PROCEDURE — 99232 SBSQ HOSP IP/OBS MODERATE 35: CPT | Performed by: INTERNAL MEDICINE

## 2022-01-01 PROCEDURE — 99222 1ST HOSP IP/OBS MODERATE 55: CPT | Performed by: INTERNAL MEDICINE

## 2022-01-01 PROCEDURE — 82945 GLUCOSE OTHER FLUID: CPT

## 2022-01-01 PROCEDURE — 97161 PT EVAL LOW COMPLEX 20 MIN: CPT

## 2022-01-01 PROCEDURE — G0299 HHS/HOSPICE OF RN EA 15 MIN: HCPCS

## 2022-01-01 PROCEDURE — 74011000258 HC RX REV CODE- 258: Performed by: INTERNAL MEDICINE

## 2022-01-01 PROCEDURE — 82962 GLUCOSE BLOOD TEST: CPT

## 2022-01-01 PROCEDURE — 77334 RADIATION TREATMENT AID(S): CPT

## 2022-01-01 PROCEDURE — 74011250636 HC RX REV CODE- 250/636: Performed by: NURSE ANESTHETIST, CERTIFIED REGISTERED

## 2022-01-01 PROCEDURE — 74011250636 HC RX REV CODE- 250/636: Performed by: RADIOLOGY

## 2022-01-01 PROCEDURE — 88374 M/PHMTRC ALYS ISHQUANT/SEMIQ: CPT

## 2022-01-01 PROCEDURE — 74011000258 HC RX REV CODE- 258: Performed by: RADIOLOGY

## 2022-01-01 PROCEDURE — 93306 TTE W/DOPPLER COMPLETE: CPT

## 2022-01-01 PROCEDURE — 84100 ASSAY OF PHOSPHORUS: CPT

## 2022-01-01 PROCEDURE — 80053 COMPREHEN METABOLIC PANEL: CPT

## 2022-01-01 PROCEDURE — 77030031139 HC SUT VCRL2 J&J -A

## 2022-01-01 PROCEDURE — 84157 ASSAY OF PROTEIN OTHER: CPT

## 2022-01-01 PROCEDURE — 83615 LACTATE (LD) (LDH) ENZYME: CPT

## 2022-01-01 PROCEDURE — 94761 N-INVAS EAR/PLS OXIMETRY MLT: CPT

## 2022-01-01 PROCEDURE — 06BQ0ZZ EXCISION OF LEFT SAPHENOUS VEIN, OPEN APPROACH: ICD-10-PCS

## 2022-01-01 PROCEDURE — 77030008462 HC STPLR SKN PROX J&J -A

## 2022-01-01 PROCEDURE — 3336500001 HSPC ELECTION

## 2022-01-01 PROCEDURE — 99152 MOD SED SAME PHYS/QHP 5/>YRS: CPT

## 2022-01-01 PROCEDURE — 84443 ASSAY THYROID STIM HORMONE: CPT

## 2022-01-01 PROCEDURE — 74174 CTA ABD&PLVS W/CONTRAST: CPT

## 2022-01-01 PROCEDURE — 77030040830 HC CATH URETH FOL MDII -A

## 2022-01-01 PROCEDURE — HOSPICE MEDICATION HC HH HOSPICE MEDICATION

## 2022-01-01 PROCEDURE — 85730 THROMBOPLASTIN TIME PARTIAL: CPT

## 2022-01-01 PROCEDURE — 96375 TX/PRO/DX INJ NEW DRUG ADDON: CPT

## 2022-01-01 PROCEDURE — 83540 ASSAY OF IRON: CPT

## 2022-01-01 PROCEDURE — 32555 ASPIRATE PLEURA W/ IMAGING: CPT

## 2022-01-01 PROCEDURE — 74011250636 HC RX REV CODE- 250/636: Performed by: HOSPITALIST

## 2022-01-01 PROCEDURE — 81235 EGFR GENE COM VARIANTS: CPT

## 2022-01-01 PROCEDURE — 74011250636 HC RX REV CODE- 250/636: Performed by: EMERGENCY MEDICINE

## 2022-01-01 PROCEDURE — 74011000250 HC RX REV CODE- 250: Performed by: NURSE PRACTITIONER

## 2022-01-01 PROCEDURE — 77014 CT GUIDED THERAPY PLAN/PLACEMENT: CPT

## 2022-01-01 PROCEDURE — 94664 DEMO&/EVAL PT USE INHALER: CPT

## 2022-01-01 PROCEDURE — 0PB63ZX EXCISION OF LEFT SCAPULA, PERCUTANEOUS APPROACH, DIAGNOSTIC: ICD-10-PCS | Performed by: RADIOLOGY

## 2022-01-01 PROCEDURE — 77030008684 HC TU ET CUF COVD -B: Performed by: STUDENT IN AN ORGANIZED HEALTH CARE EDUCATION/TRAINING PROGRAM

## 2022-01-01 PROCEDURE — 87205 SMEAR GRAM STAIN: CPT

## 2022-01-01 PROCEDURE — 82728 ASSAY OF FERRITIN: CPT

## 2022-01-01 PROCEDURE — 88360 TUMOR IMMUNOHISTOCHEM/MANUAL: CPT

## 2022-01-01 PROCEDURE — 85610 PROTHROMBIN TIME: CPT

## 2022-01-01 PROCEDURE — 74011250636 HC RX REV CODE- 250/636: Performed by: STUDENT IN AN ORGANIZED HEALTH CARE EDUCATION/TRAINING PROGRAM

## 2022-01-01 PROCEDURE — 84145 PROCALCITONIN (PCT): CPT

## 2022-01-01 PROCEDURE — 74011000636 HC RX REV CODE- 636: Performed by: EMERGENCY MEDICINE

## 2022-01-01 PROCEDURE — 76010000172 HC OR TIME 2.5 TO 3 HR INTENSV-TIER 1

## 2022-01-01 PROCEDURE — 77290 THER RAD SIMULAJ FIELD CPLX: CPT

## 2022-01-01 PROCEDURE — 81210 BRAF GENE: CPT

## 2022-01-01 PROCEDURE — 84155 ASSAY OF PROTEIN SERUM: CPT

## 2022-01-01 PROCEDURE — 74011250636 HC RX REV CODE- 250/636: Performed by: ANESTHESIOLOGY

## 2022-01-01 PROCEDURE — 77307 TELETHX ISODOSE PLAN CPLX: CPT

## 2022-01-01 PROCEDURE — 77030040361 HC SLV COMPR DVT MDII -B

## 2022-01-01 PROCEDURE — 96374 THER/PROPH/DIAG INJ IV PUSH: CPT

## 2022-01-01 PROCEDURE — 77030002986 HC SUT PROL J&J -A

## 2022-01-01 PROCEDURE — 97116 GAIT TRAINING THERAPY: CPT

## 2022-01-01 PROCEDURE — 88377 M/PHMTRC ALYS ISHQUANT/SEMIQ: CPT

## 2022-01-01 PROCEDURE — 77030042556 HC PNCL CAUT -B

## 2022-01-01 PROCEDURE — 76210000006 HC OR PH I REC 0.5 TO 1 HR

## 2022-01-01 PROCEDURE — 74011000250 HC RX REV CODE- 250: Performed by: STUDENT IN AN ORGANIZED HEALTH CARE EDUCATION/TRAINING PROGRAM

## 2022-01-01 RX ORDER — NEOSTIGMINE METHYLSULFATE 1 MG/ML
INJECTION, SOLUTION INTRAVENOUS AS NEEDED
Status: DISCONTINUED | OUTPATIENT
Start: 2022-01-01 | End: 2022-01-01 | Stop reason: HOSPADM

## 2022-01-01 RX ORDER — ENOXAPARIN SODIUM 100 MG/ML
30 INJECTION SUBCUTANEOUS EVERY 24 HOURS
Status: DISCONTINUED | OUTPATIENT
Start: 2022-01-01 | End: 2022-01-01 | Stop reason: HOSPADM

## 2022-01-01 RX ORDER — ACETAMINOPHEN 325 MG/1
650 TABLET ORAL ONCE
Status: COMPLETED | OUTPATIENT
Start: 2022-01-01 | End: 2022-01-01

## 2022-01-01 RX ORDER — HEPARIN SODIUM 10000 [USP'U]/100ML
12-25 INJECTION, SOLUTION INTRAVENOUS
Status: DISCONTINUED | OUTPATIENT
Start: 2022-01-01 | End: 2022-01-01

## 2022-01-01 RX ORDER — FLUMAZENIL 0.1 MG/ML
0.5 INJECTION INTRAVENOUS
Status: DISCONTINUED | OUTPATIENT
Start: 2022-01-01 | End: 2022-01-01

## 2022-01-01 RX ORDER — HYDROMORPHONE HYDROCHLORIDE 1 MG/ML
0.2 INJECTION, SOLUTION INTRAMUSCULAR; INTRAVENOUS; SUBCUTANEOUS
Status: DISCONTINUED | OUTPATIENT
Start: 2022-01-01 | End: 2022-01-01 | Stop reason: HOSPADM

## 2022-01-01 RX ORDER — GLYCOPYRROLATE 0.2 MG/ML
0.2 INJECTION INTRAMUSCULAR; INTRAVENOUS
Status: DISCONTINUED | OUTPATIENT
Start: 2022-01-01 | End: 2022-01-01 | Stop reason: HOSPADM

## 2022-01-01 RX ORDER — SODIUM CHLORIDE 9 MG/ML
25 INJECTION, SOLUTION INTRAVENOUS
Status: DISCONTINUED | OUTPATIENT
Start: 2022-01-01 | End: 2022-01-01

## 2022-01-01 RX ORDER — DEXTROSE MONOHYDRATE AND SODIUM CHLORIDE 5; .45 G/100ML; G/100ML
75 INJECTION, SOLUTION INTRAVENOUS CONTINUOUS
Status: DISCONTINUED | OUTPATIENT
Start: 2022-01-01 | End: 2022-01-01

## 2022-01-01 RX ORDER — MORPHINE SULFATE 2 MG/ML
2 INJECTION, SOLUTION INTRAMUSCULAR; INTRAVENOUS
Status: DISCONTINUED | OUTPATIENT
Start: 2022-01-01 | End: 2022-01-01 | Stop reason: HOSPADM

## 2022-01-01 RX ORDER — MORPHINE SULFATE 4 MG/ML
4 INJECTION INTRAVENOUS
Status: DISCONTINUED | OUTPATIENT
Start: 2022-01-01 | End: 2022-01-01 | Stop reason: HOSPADM

## 2022-01-01 RX ORDER — ONDANSETRON 2 MG/ML
4 INJECTION INTRAMUSCULAR; INTRAVENOUS AS NEEDED
Status: DISCONTINUED | OUTPATIENT
Start: 2022-01-01 | End: 2022-01-01 | Stop reason: HOSPADM

## 2022-01-01 RX ORDER — MORPHINE SULFATE 4 MG/ML
4 INJECTION INTRAVENOUS EVERY 4 HOURS
Status: DISCONTINUED | OUTPATIENT
Start: 2022-01-01 | End: 2022-01-01 | Stop reason: HOSPADM

## 2022-01-01 RX ORDER — ONDANSETRON 2 MG/ML
4 INJECTION INTRAMUSCULAR; INTRAVENOUS
Status: DISCONTINUED | OUTPATIENT
Start: 2022-01-01 | End: 2022-01-01 | Stop reason: HOSPADM

## 2022-01-01 RX ORDER — HYDROMORPHONE HYDROCHLORIDE 2 MG/ML
INJECTION, SOLUTION INTRAMUSCULAR; INTRAVENOUS; SUBCUTANEOUS AS NEEDED
Status: DISCONTINUED | OUTPATIENT
Start: 2022-01-01 | End: 2022-01-01 | Stop reason: HOSPADM

## 2022-01-01 RX ORDER — MIDAZOLAM HYDROCHLORIDE 1 MG/ML
2 INJECTION, SOLUTION INTRAMUSCULAR; INTRAVENOUS
Status: DISCONTINUED | OUTPATIENT
Start: 2022-01-01 | End: 2022-01-01 | Stop reason: HOSPADM

## 2022-01-01 RX ORDER — ACETAMINOPHEN 325 MG/1
TABLET ORAL
Status: CANCELLED | OUTPATIENT
Start: 2022-01-01

## 2022-01-01 RX ORDER — ACETAMINOPHEN 650 MG/1
650 SUPPOSITORY RECTAL
Status: DISCONTINUED | OUTPATIENT
Start: 2022-01-01 | End: 2022-01-01 | Stop reason: HOSPADM

## 2022-01-01 RX ORDER — GLYCOPYRROLATE 0.2 MG/ML
INJECTION INTRAMUSCULAR; INTRAVENOUS AS NEEDED
Status: DISCONTINUED | OUTPATIENT
Start: 2022-01-01 | End: 2022-01-01 | Stop reason: HOSPADM

## 2022-01-01 RX ORDER — OXYCODONE HYDROCHLORIDE 5 MG/1
5 TABLET ORAL
Status: DISCONTINUED | OUTPATIENT
Start: 2022-01-01 | End: 2022-01-01

## 2022-01-01 RX ORDER — HYDRALAZINE HYDROCHLORIDE 20 MG/ML
INJECTION INTRAMUSCULAR; INTRAVENOUS
Status: DISPENSED
Start: 2022-01-01 | End: 2022-01-01

## 2022-01-01 RX ORDER — SODIUM CHLORIDE, SODIUM LACTATE, POTASSIUM CHLORIDE, CALCIUM CHLORIDE 600; 310; 30; 20 MG/100ML; MG/100ML; MG/100ML; MG/100ML
50 INJECTION, SOLUTION INTRAVENOUS CONTINUOUS
Status: DISCONTINUED | OUTPATIENT
Start: 2022-01-01 | End: 2022-01-01 | Stop reason: HOSPADM

## 2022-01-01 RX ORDER — MORPHINE SULFATE 2 MG/ML
4 INJECTION, SOLUTION INTRAMUSCULAR; INTRAVENOUS
Status: DISCONTINUED | OUTPATIENT
Start: 2022-01-01 | End: 2022-01-01 | Stop reason: HOSPADM

## 2022-01-01 RX ORDER — ASPIRIN 81 MG/1
81 TABLET ORAL
Status: DISCONTINUED | OUTPATIENT
Start: 2022-01-01 | End: 2022-01-01 | Stop reason: HOSPADM

## 2022-01-01 RX ORDER — MORPHINE SULFATE 2 MG/ML
2 INJECTION, SOLUTION INTRAMUSCULAR; INTRAVENOUS
Status: DISCONTINUED | OUTPATIENT
Start: 2022-01-01 | End: 2022-01-01

## 2022-01-01 RX ORDER — HEPARIN SODIUM 1000 [USP'U]/ML
60 INJECTION, SOLUTION INTRAVENOUS; SUBCUTANEOUS ONCE
Status: COMPLETED | OUTPATIENT
Start: 2022-01-01 | End: 2022-01-01

## 2022-01-01 RX ORDER — OXYCODONE AND ACETAMINOPHEN 5; 325 MG/1; MG/1
1 TABLET ORAL
Qty: 30 TABLET | Refills: 0 | Status: SHIPPED | OUTPATIENT
Start: 2022-01-01 | End: 2022-01-01

## 2022-01-01 RX ORDER — FENTANYL CITRATE 50 UG/ML
INJECTION, SOLUTION INTRAMUSCULAR; INTRAVENOUS AS NEEDED
Status: DISCONTINUED | OUTPATIENT
Start: 2022-01-01 | End: 2022-01-01 | Stop reason: HOSPADM

## 2022-01-01 RX ORDER — ONDANSETRON 2 MG/ML
INJECTION INTRAMUSCULAR; INTRAVENOUS AS NEEDED
Status: DISCONTINUED | OUTPATIENT
Start: 2022-01-01 | End: 2022-01-01 | Stop reason: HOSPADM

## 2022-01-01 RX ORDER — SODIUM CHLORIDE 0.9 % (FLUSH) 0.9 %
5-40 SYRINGE (ML) INJECTION AS NEEDED
Status: DISCONTINUED | OUTPATIENT
Start: 2022-01-01 | End: 2022-01-01 | Stop reason: HOSPADM

## 2022-01-01 RX ORDER — OXYCODONE HYDROCHLORIDE 5 MG/1
5 TABLET ORAL
Status: DISCONTINUED | OUTPATIENT
Start: 2022-01-01 | End: 2022-01-01 | Stop reason: HOSPADM

## 2022-01-01 RX ORDER — NITROGLYCERIN 0.4 MG/1
0.4 TABLET SUBLINGUAL
Status: DISCONTINUED | OUTPATIENT
Start: 2022-01-01 | End: 2022-01-01 | Stop reason: HOSPADM

## 2022-01-01 RX ORDER — METOPROLOL TARTRATE 25 MG/1
25 TABLET, FILM COATED ORAL EVERY 12 HOURS
Status: DISCONTINUED | OUTPATIENT
Start: 2022-01-01 | End: 2022-01-01 | Stop reason: HOSPADM

## 2022-01-01 RX ORDER — SODIUM CHLORIDE 0.9 % (FLUSH) 0.9 %
5-40 SYRINGE (ML) INJECTION EVERY 8 HOURS
Status: DISCONTINUED | OUTPATIENT
Start: 2022-01-01 | End: 2022-01-01 | Stop reason: HOSPADM

## 2022-01-01 RX ORDER — ONDANSETRON 4 MG/1
4 TABLET, ORALLY DISINTEGRATING ORAL
Status: DISCONTINUED | OUTPATIENT
Start: 2022-01-01 | End: 2022-01-01 | Stop reason: HOSPADM

## 2022-01-01 RX ORDER — LIDOCAINE HYDROCHLORIDE 10 MG/ML
0.1 INJECTION, SOLUTION EPIDURAL; INFILTRATION; INTRACAUDAL; PERINEURAL AS NEEDED
Status: DISCONTINUED | OUTPATIENT
Start: 2022-01-01 | End: 2022-01-01 | Stop reason: HOSPADM

## 2022-01-01 RX ORDER — DEXAMETHASONE SODIUM PHOSPHATE 4 MG/ML
INJECTION, SOLUTION INTRA-ARTICULAR; INTRALESIONAL; INTRAMUSCULAR; INTRAVENOUS; SOFT TISSUE AS NEEDED
Status: DISCONTINUED | OUTPATIENT
Start: 2022-01-01 | End: 2022-01-01 | Stop reason: HOSPADM

## 2022-01-01 RX ORDER — HYDROCODONE BITARTRATE AND ACETAMINOPHEN 7.5; 325 MG/1; MG/1
1 TABLET ORAL
Status: DISCONTINUED | OUTPATIENT
Start: 2022-01-01 | End: 2022-01-01 | Stop reason: HOSPADM

## 2022-01-01 RX ORDER — ENOXAPARIN SODIUM 100 MG/ML
40 INJECTION SUBCUTANEOUS EVERY 24 HOURS
Status: DISCONTINUED | OUTPATIENT
Start: 2022-01-01 | End: 2022-01-01 | Stop reason: HOSPADM

## 2022-01-01 RX ORDER — POLYETHYLENE GLYCOL 3350 17 G/17G
17 POWDER, FOR SOLUTION ORAL DAILY PRN
Status: DISCONTINUED | OUTPATIENT
Start: 2022-01-01 | End: 2022-01-01 | Stop reason: HOSPADM

## 2022-01-01 RX ORDER — HEPARIN SODIUM 1000 [USP'U]/ML
2760 INJECTION, SOLUTION INTRAVENOUS; SUBCUTANEOUS ONCE
Status: COMPLETED | OUTPATIENT
Start: 2022-01-01 | End: 2022-01-01

## 2022-01-01 RX ORDER — MIDAZOLAM HYDROCHLORIDE 1 MG/ML
.5-5 INJECTION, SOLUTION INTRAMUSCULAR; INTRAVENOUS
Status: DISCONTINUED | OUTPATIENT
Start: 2022-01-01 | End: 2022-01-01

## 2022-01-01 RX ORDER — LIDOCAINE HYDROCHLORIDE 20 MG/ML
INJECTION, SOLUTION EPIDURAL; INFILTRATION; INTRACAUDAL; PERINEURAL AS NEEDED
Status: DISCONTINUED | OUTPATIENT
Start: 2022-01-01 | End: 2022-01-01 | Stop reason: HOSPADM

## 2022-01-01 RX ORDER — ONDANSETRON 2 MG/ML
4 INJECTION INTRAMUSCULAR; INTRAVENOUS ONCE
Status: COMPLETED | OUTPATIENT
Start: 2022-01-01 | End: 2022-01-01

## 2022-01-01 RX ORDER — HEPARIN SODIUM 1000 [USP'U]/ML
INJECTION, SOLUTION INTRAVENOUS; SUBCUTANEOUS AS NEEDED
Status: DISCONTINUED | OUTPATIENT
Start: 2022-01-01 | End: 2022-01-01 | Stop reason: HOSPADM

## 2022-01-01 RX ORDER — MIDAZOLAM HYDROCHLORIDE 1 MG/ML
2 INJECTION, SOLUTION INTRAMUSCULAR; INTRAVENOUS
Status: DISCONTINUED | OUTPATIENT
Start: 2022-01-01 | End: 2022-01-01

## 2022-01-01 RX ORDER — GABAPENTIN 100 MG/1
200 CAPSULE ORAL 2 TIMES DAILY
Status: DISCONTINUED | OUTPATIENT
Start: 2022-01-01 | End: 2022-01-01

## 2022-01-01 RX ORDER — PROPOFOL 10 MG/ML
INJECTION, EMULSION INTRAVENOUS AS NEEDED
Status: DISCONTINUED | OUTPATIENT
Start: 2022-01-01 | End: 2022-01-01 | Stop reason: HOSPADM

## 2022-01-01 RX ORDER — KETOROLAC TROMETHAMINE 30 MG/ML
30 INJECTION, SOLUTION INTRAMUSCULAR; INTRAVENOUS
Status: DISCONTINUED | OUTPATIENT
Start: 2022-01-01 | End: 2022-01-01 | Stop reason: HOSPADM

## 2022-01-01 RX ORDER — OXYCODONE HYDROCHLORIDE 5 MG/1
10 TABLET ORAL
Status: DISCONTINUED | OUTPATIENT
Start: 2022-01-01 | End: 2022-01-01 | Stop reason: HOSPADM

## 2022-01-01 RX ORDER — FACIAL-BODY WIPES
10 EACH TOPICAL DAILY PRN
Status: DISCONTINUED | OUTPATIENT
Start: 2022-01-01 | End: 2022-01-01 | Stop reason: HOSPADM

## 2022-01-01 RX ORDER — NALOXONE HYDROCHLORIDE 0.4 MG/ML
1 INJECTION, SOLUTION INTRAMUSCULAR; INTRAVENOUS; SUBCUTANEOUS ONCE
Status: ACTIVE | OUTPATIENT
Start: 2022-01-01 | End: 2022-01-01

## 2022-01-01 RX ORDER — NALOXONE HYDROCHLORIDE 0.4 MG/ML
0.4 INJECTION, SOLUTION INTRAMUSCULAR; INTRAVENOUS; SUBCUTANEOUS
Status: DISCONTINUED | OUTPATIENT
Start: 2022-01-01 | End: 2022-01-01 | Stop reason: HOSPADM

## 2022-01-01 RX ORDER — SODIUM BICARBONATE 42 MG/ML
2 INJECTION, SOLUTION INTRAVENOUS
Status: COMPLETED | OUTPATIENT
Start: 2022-01-01 | End: 2022-01-01

## 2022-01-01 RX ORDER — HYDRALAZINE HYDROCHLORIDE 20 MG/ML
10 INJECTION INTRAMUSCULAR; INTRAVENOUS ONCE
Status: COMPLETED | OUTPATIENT
Start: 2022-01-01 | End: 2022-01-01

## 2022-01-01 RX ORDER — SUCCINYLCHOLINE CHLORIDE 20 MG/ML
INJECTION INTRAMUSCULAR; INTRAVENOUS AS NEEDED
Status: DISCONTINUED | OUTPATIENT
Start: 2022-01-01 | End: 2022-01-01 | Stop reason: HOSPADM

## 2022-01-01 RX ORDER — MIDAZOLAM HYDROCHLORIDE 1 MG/ML
2 INJECTION, SOLUTION INTRAMUSCULAR; INTRAVENOUS EVERY 4 HOURS
Status: DISCONTINUED | OUTPATIENT
Start: 2022-01-01 | End: 2022-01-01 | Stop reason: HOSPADM

## 2022-01-01 RX ORDER — KETAMINE HYDROCHLORIDE 10 MG/ML
INJECTION, SOLUTION INTRAMUSCULAR; INTRAVENOUS AS NEEDED
Status: DISCONTINUED | OUTPATIENT
Start: 2022-01-01 | End: 2022-01-01 | Stop reason: HOSPADM

## 2022-01-01 RX ORDER — DEXAMETHASONE SODIUM PHOSPHATE 4 MG/ML
4 INJECTION, SOLUTION INTRA-ARTICULAR; INTRALESIONAL; INTRAMUSCULAR; INTRAVENOUS; SOFT TISSUE
Status: DISCONTINUED | OUTPATIENT
Start: 2022-01-01 | End: 2022-01-01

## 2022-01-01 RX ORDER — MORPHINE SULFATE 4 MG/ML
4 INJECTION INTRAVENOUS ONCE
Status: COMPLETED | OUTPATIENT
Start: 2022-01-01 | End: 2022-01-01

## 2022-01-01 RX ORDER — ACETAMINOPHEN 325 MG/1
650 TABLET ORAL
Status: DISCONTINUED | OUTPATIENT
Start: 2022-01-01 | End: 2022-01-01 | Stop reason: HOSPADM

## 2022-01-01 RX ORDER — FENTANYL CITRATE 50 UG/ML
25-200 INJECTION, SOLUTION INTRAMUSCULAR; INTRAVENOUS
Status: DISCONTINUED | OUTPATIENT
Start: 2022-01-01 | End: 2022-01-01

## 2022-01-01 RX ORDER — AMOXICILLIN 250 MG
2 CAPSULE ORAL DAILY
Status: DISCONTINUED | OUTPATIENT
Start: 2022-01-01 | End: 2022-01-01 | Stop reason: HOSPADM

## 2022-01-01 RX ORDER — LABETALOL HYDROCHLORIDE 5 MG/ML
20 INJECTION, SOLUTION INTRAVENOUS
Status: DISCONTINUED | OUTPATIENT
Start: 2022-01-01 | End: 2022-01-01

## 2022-01-01 RX ORDER — SODIUM BICARBONATE 42 MG/ML
1 INJECTION, SOLUTION INTRAVENOUS
Status: COMPLETED | OUTPATIENT
Start: 2022-01-01 | End: 2022-01-01

## 2022-01-01 RX ORDER — IPRATROPIUM BROMIDE AND ALBUTEROL SULFATE 2.5; .5 MG/3ML; MG/3ML
3 SOLUTION RESPIRATORY (INHALATION)
Status: DISCONTINUED | OUTPATIENT
Start: 2022-01-01 | End: 2022-01-01 | Stop reason: HOSPADM

## 2022-01-01 RX ORDER — LIDOCAINE HYDROCHLORIDE 10 MG/ML
8 INJECTION, SOLUTION EPIDURAL; INFILTRATION; INTRACAUDAL; PERINEURAL
Status: COMPLETED | OUTPATIENT
Start: 2022-01-01 | End: 2022-01-01

## 2022-01-01 RX ORDER — ACETAMINOPHEN 325 MG/1
650 TABLET ORAL 3 TIMES DAILY
Status: DISCONTINUED | OUTPATIENT
Start: 2022-01-01 | End: 2022-01-01 | Stop reason: HOSPADM

## 2022-01-01 RX ORDER — ASPIRIN 81 MG/1
81 TABLET ORAL
COMMUNITY
End: 2022-01-01

## 2022-01-01 RX ORDER — PANTOPRAZOLE SODIUM 40 MG/1
40 TABLET, DELAYED RELEASE ORAL
Status: DISCONTINUED | OUTPATIENT
Start: 2022-01-01 | End: 2022-01-01 | Stop reason: HOSPADM

## 2022-01-01 RX ORDER — ACETAMINOPHEN 325 MG/1
650 TABLET ORAL
Status: DISCONTINUED | OUTPATIENT
Start: 2022-01-01 | End: 2022-01-01

## 2022-01-01 RX ORDER — MIDAZOLAM HYDROCHLORIDE 1 MG/ML
INJECTION, SOLUTION INTRAMUSCULAR; INTRAVENOUS AS NEEDED
Status: DISCONTINUED | OUTPATIENT
Start: 2022-01-01 | End: 2022-01-01 | Stop reason: HOSPADM

## 2022-01-01 RX ORDER — DEXAMETHASONE 4 MG/1
4 TABLET ORAL DAILY
Status: DISCONTINUED | OUTPATIENT
Start: 2022-01-01 | End: 2022-01-01

## 2022-01-01 RX ORDER — SODIUM CHLORIDE, SODIUM LACTATE, POTASSIUM CHLORIDE, CALCIUM CHLORIDE 600; 310; 30; 20 MG/100ML; MG/100ML; MG/100ML; MG/100ML
100 INJECTION, SOLUTION INTRAVENOUS CONTINUOUS
Status: DISCONTINUED | OUTPATIENT
Start: 2022-01-01 | End: 2022-01-01 | Stop reason: HOSPADM

## 2022-01-01 RX ORDER — IPRATROPIUM BROMIDE AND ALBUTEROL SULFATE 2.5; .5 MG/3ML; MG/3ML
3 SOLUTION RESPIRATORY (INHALATION)
Status: DISCONTINUED | OUTPATIENT
Start: 2022-01-01 | End: 2022-01-01

## 2022-01-01 RX ORDER — PROPOFOL 10 MG/ML
INJECTION, EMULSION INTRAVENOUS
Status: DISCONTINUED | OUTPATIENT
Start: 2022-01-01 | End: 2022-01-01 | Stop reason: HOSPADM

## 2022-01-01 RX ORDER — FENTANYL CITRATE 50 UG/ML
25 INJECTION, SOLUTION INTRAMUSCULAR; INTRAVENOUS
Status: DISCONTINUED | OUTPATIENT
Start: 2022-01-01 | End: 2022-01-01 | Stop reason: HOSPADM

## 2022-01-01 RX ORDER — ROCURONIUM BROMIDE 10 MG/ML
INJECTION, SOLUTION INTRAVENOUS AS NEEDED
Status: DISCONTINUED | OUTPATIENT
Start: 2022-01-01 | End: 2022-01-01 | Stop reason: HOSPADM

## 2022-01-01 RX ORDER — LIDOCAINE HYDROCHLORIDE 10 MG/ML
10 INJECTION, SOLUTION EPIDURAL; INFILTRATION; INTRACAUDAL; PERINEURAL
Status: COMPLETED | OUTPATIENT
Start: 2022-01-01 | End: 2022-01-01

## 2022-01-01 RX ORDER — DEXMEDETOMIDINE HYDROCHLORIDE 100 UG/ML
INJECTION, SOLUTION INTRAVENOUS AS NEEDED
Status: DISCONTINUED | OUTPATIENT
Start: 2022-01-01 | End: 2022-01-01 | Stop reason: HOSPADM

## 2022-01-01 RX ORDER — OXYCODONE HCL 10 MG/1
10 TABLET, FILM COATED, EXTENDED RELEASE ORAL DAILY
Status: DISCONTINUED | OUTPATIENT
Start: 2022-01-01 | End: 2022-01-01 | Stop reason: HOSPADM

## 2022-01-01 RX ADMIN — SODIUM CHLORIDE, PRESERVATIVE FREE 10 ML: 5 INJECTION INTRAVENOUS at 06:00

## 2022-01-01 RX ADMIN — SODIUM CHLORIDE, PRESERVATIVE FREE 10 ML: 5 INJECTION INTRAVENOUS at 16:33

## 2022-01-01 RX ADMIN — ENOXAPARIN SODIUM 30 MG: 100 INJECTION SUBCUTANEOUS at 16:32

## 2022-01-01 RX ADMIN — ENOXAPARIN SODIUM 30 MG: 100 INJECTION SUBCUTANEOUS at 16:33

## 2022-01-01 RX ADMIN — ACETAMINOPHEN 650 MG: 325 TABLET, FILM COATED ORAL at 09:03

## 2022-01-01 RX ADMIN — SODIUM CHLORIDE, POTASSIUM CHLORIDE, SODIUM LACTATE AND CALCIUM CHLORIDE 100 ML/HR: 600; 310; 30; 20 INJECTION, SOLUTION INTRAVENOUS at 06:33

## 2022-01-01 RX ADMIN — Medication 1 CAPSULE: at 09:31

## 2022-01-01 RX ADMIN — MORPHINE SULFATE 2 MG: 2 INJECTION, SOLUTION INTRAMUSCULAR; INTRAVENOUS at 13:22

## 2022-01-01 RX ADMIN — OXYCODONE 5 MG: 5 TABLET ORAL at 00:38

## 2022-01-01 RX ADMIN — ACETAMINOPHEN 650 MG: 325 TABLET ORAL at 17:25

## 2022-01-01 RX ADMIN — FENTANYL CITRATE 50 MCG: 50 INJECTION, SOLUTION INTRAMUSCULAR; INTRAVENOUS at 10:57

## 2022-01-01 RX ADMIN — MORPHINE SULFATE 4 MG: 4 INJECTION, SOLUTION INTRAMUSCULAR; INTRAVENOUS at 02:01

## 2022-01-01 RX ADMIN — METOPROLOL TARTRATE 25 MG: 25 TABLET, FILM COATED ORAL at 22:10

## 2022-01-01 RX ADMIN — SODIUM CHLORIDE, PRESERVATIVE FREE 10 ML: 5 INJECTION INTRAVENOUS at 22:00

## 2022-01-01 RX ADMIN — WATER 2 G: 1 INJECTION INTRAMUSCULAR; INTRAVENOUS; SUBCUTANEOUS at 12:44

## 2022-01-01 RX ADMIN — DOCUSATE SODIUM 50MG AND SENNOSIDES 8.6MG 2 TABLET: 8.6; 5 TABLET, FILM COATED ORAL at 08:08

## 2022-01-01 RX ADMIN — ASPIRIN 81 MG: 81 TABLET, COATED ORAL at 06:41

## 2022-01-01 RX ADMIN — PHENYLEPHRINE HYDROCHLORIDE 40 MCG/MIN: 10 INJECTION INTRAVENOUS at 12:27

## 2022-01-01 RX ADMIN — OXYCODONE HYDROCHLORIDE 10 MG: 10 TABLET, FILM COATED, EXTENDED RELEASE ORAL at 19:30

## 2022-01-01 RX ADMIN — ACETAMINOPHEN 650 MG: 325 TABLET, FILM COATED ORAL at 22:23

## 2022-01-01 RX ADMIN — ASPIRIN 81 MG: 81 TABLET, COATED ORAL at 06:35

## 2022-01-01 RX ADMIN — SODIUM CHLORIDE, PRESERVATIVE FREE 10 ML: 5 INJECTION INTRAVENOUS at 14:00

## 2022-01-01 RX ADMIN — SODIUM CHLORIDE, PRESERVATIVE FREE 10 ML: 5 INJECTION INTRAVENOUS at 07:25

## 2022-01-01 RX ADMIN — MIDAZOLAM HYDROCHLORIDE 2 MG: 1 INJECTION, SOLUTION INTRAMUSCULAR; INTRAVENOUS at 22:57

## 2022-01-01 RX ADMIN — MIDAZOLAM HYDROCHLORIDE 2 MG: 1 INJECTION, SOLUTION INTRAMUSCULAR; INTRAVENOUS at 21:52

## 2022-01-01 RX ADMIN — SODIUM CHLORIDE, PRESERVATIVE FREE 10 ML: 5 INJECTION INTRAVENOUS at 05:06

## 2022-01-01 RX ADMIN — MORPHINE SULFATE 2 MG: 2 INJECTION, SOLUTION INTRAMUSCULAR; INTRAVENOUS at 00:00

## 2022-01-01 RX ADMIN — MORPHINE SULFATE 2 MG: 2 INJECTION, SOLUTION INTRAMUSCULAR; INTRAVENOUS at 10:05

## 2022-01-01 RX ADMIN — MORPHINE SULFATE 4 MG: 4 INJECTION, SOLUTION INTRAMUSCULAR; INTRAVENOUS at 07:50

## 2022-01-01 RX ADMIN — MORPHINE SULFATE 4 MG: 4 INJECTION, SOLUTION INTRAMUSCULAR; INTRAVENOUS at 14:39

## 2022-01-01 RX ADMIN — MORPHINE SULFATE 2 MG: 2 INJECTION, SOLUTION INTRAMUSCULAR; INTRAVENOUS at 03:40

## 2022-01-01 RX ADMIN — ACETAMINOPHEN 650 MG: 325 TABLET, FILM COATED ORAL at 16:33

## 2022-01-01 RX ADMIN — OXYCODONE 10 MG: 5 TABLET ORAL at 11:34

## 2022-01-01 RX ADMIN — METOPROLOL TARTRATE 25 MG: 25 TABLET, FILM COATED ORAL at 09:07

## 2022-01-01 RX ADMIN — PANTOPRAZOLE SODIUM 40 MG: 40 TABLET, DELAYED RELEASE ORAL at 06:16

## 2022-01-01 RX ADMIN — METOPROLOL TARTRATE 25 MG: 25 TABLET, FILM COATED ORAL at 20:34

## 2022-01-01 RX ADMIN — MORPHINE SULFATE 4 MG: 4 INJECTION, SOLUTION INTRAMUSCULAR; INTRAVENOUS at 13:56

## 2022-01-01 RX ADMIN — Medication 20 MG: at 13:00

## 2022-01-01 RX ADMIN — OXYCODONE 10 MG: 5 TABLET ORAL at 10:36

## 2022-01-01 RX ADMIN — ACETAMINOPHEN 650 MG: 325 TABLET, FILM COATED ORAL at 22:16

## 2022-01-01 RX ADMIN — IOPAMIDOL 100 ML: 755 INJECTION, SOLUTION INTRAVENOUS at 21:16

## 2022-01-01 RX ADMIN — SODIUM CHLORIDE, POTASSIUM CHLORIDE, SODIUM LACTATE AND CALCIUM CHLORIDE 100 ML/HR: 600; 310; 30; 20 INJECTION, SOLUTION INTRAVENOUS at 05:27

## 2022-01-01 RX ADMIN — MORPHINE SULFATE 2 MG: 2 INJECTION, SOLUTION INTRAMUSCULAR; INTRAVENOUS at 17:43

## 2022-01-01 RX ADMIN — MORPHINE SULFATE 4 MG: 4 INJECTION, SOLUTION INTRAMUSCULAR; INTRAVENOUS at 18:11

## 2022-01-01 RX ADMIN — SODIUM CHLORIDE, POTASSIUM CHLORIDE, SODIUM LACTATE AND CALCIUM CHLORIDE 100 ML/HR: 600; 310; 30; 20 INJECTION, SOLUTION INTRAVENOUS at 19:03

## 2022-01-01 RX ADMIN — ACETAMINOPHEN 650 MG: 325 TABLET ORAL at 10:12

## 2022-01-01 RX ADMIN — SODIUM CHLORIDE, PRESERVATIVE FREE 10 ML: 5 INJECTION INTRAVENOUS at 08:33

## 2022-01-01 RX ADMIN — IPRATROPIUM BROMIDE AND ALBUTEROL SULFATE 3 ML: .5; 3 SOLUTION RESPIRATORY (INHALATION) at 07:44

## 2022-01-01 RX ADMIN — SODIUM CHLORIDE, PRESERVATIVE FREE 10 ML: 5 INJECTION INTRAVENOUS at 21:36

## 2022-01-01 RX ADMIN — ACETAMINOPHEN 650 MG: 325 TABLET, FILM COATED ORAL at 16:04

## 2022-01-01 RX ADMIN — LIDOCAINE HYDROCHLORIDE 100 MG: 20 INJECTION, SOLUTION EPIDURAL; INFILTRATION; INTRACAUDAL; PERINEURAL at 12:27

## 2022-01-01 RX ADMIN — OXYCODONE 10 MG: 5 TABLET ORAL at 16:04

## 2022-01-01 RX ADMIN — MORPHINE SULFATE 2 MG: 2 INJECTION, SOLUTION INTRAMUSCULAR; INTRAVENOUS at 12:05

## 2022-01-01 RX ADMIN — ACETAMINOPHEN 650 MG: 325 TABLET, FILM COATED ORAL at 08:13

## 2022-01-01 RX ADMIN — Medication 1 CAPSULE: at 09:15

## 2022-01-01 RX ADMIN — HYDROMORPHONE HYDROCHLORIDE 0.5 MG: 2 INJECTION, SOLUTION INTRAMUSCULAR; INTRAVENOUS; SUBCUTANEOUS at 13:00

## 2022-01-01 RX ADMIN — SODIUM CHLORIDE, PRESERVATIVE FREE 10 ML: 5 INJECTION INTRAVENOUS at 15:45

## 2022-01-01 RX ADMIN — PROPOFOL 200 MG: 10 INJECTION, EMULSION INTRAVENOUS at 12:27

## 2022-01-01 RX ADMIN — ENOXAPARIN SODIUM 40 MG: 100 INJECTION SUBCUTANEOUS at 21:18

## 2022-01-01 RX ADMIN — ASPIRIN 81 MG: 81 TABLET, COATED ORAL at 06:52

## 2022-01-01 RX ADMIN — MORPHINE SULFATE 4 MG: 4 INJECTION, SOLUTION INTRAMUSCULAR; INTRAVENOUS at 06:44

## 2022-01-01 RX ADMIN — MORPHINE SULFATE 4 MG: 4 INJECTION INTRAVENOUS at 18:40

## 2022-01-01 RX ADMIN — ACETAMINOPHEN 650 MG: 325 TABLET, FILM COATED ORAL at 15:16

## 2022-01-01 RX ADMIN — IPRATROPIUM BROMIDE AND ALBUTEROL SULFATE 3 ML: .5; 3 SOLUTION RESPIRATORY (INHALATION) at 07:36

## 2022-01-01 RX ADMIN — Medication 1 CAPSULE: at 09:07

## 2022-01-01 RX ADMIN — ONDANSETRON HYDROCHLORIDE 4 MG: 2 INJECTION, SOLUTION INTRAMUSCULAR; INTRAVENOUS at 14:52

## 2022-01-01 RX ADMIN — IPRATROPIUM BROMIDE AND ALBUTEROL SULFATE 3 ML: .5; 3 SOLUTION RESPIRATORY (INHALATION) at 07:47

## 2022-01-01 RX ADMIN — METOPROLOL TARTRATE 25 MG: 25 TABLET, FILM COATED ORAL at 21:55

## 2022-01-01 RX ADMIN — SODIUM CHLORIDE 1 G: 9 INJECTION INTRAMUSCULAR; INTRAVENOUS; SUBCUTANEOUS at 23:59

## 2022-01-01 RX ADMIN — Medication 1 CAPSULE: at 08:20

## 2022-01-01 RX ADMIN — HYDROMORPHONE HYDROCHLORIDE 0.2 MG: 1 INJECTION, SOLUTION INTRAMUSCULAR; INTRAVENOUS; SUBCUTANEOUS at 16:58

## 2022-01-01 RX ADMIN — Medication 1 CAPSULE: at 08:33

## 2022-01-01 RX ADMIN — IPRATROPIUM BROMIDE AND ALBUTEROL SULFATE 3 ML: .5; 3 SOLUTION RESPIRATORY (INHALATION) at 20:11

## 2022-01-01 RX ADMIN — MORPHINE SULFATE 4 MG: 4 INJECTION, SOLUTION INTRAMUSCULAR; INTRAVENOUS at 19:44

## 2022-01-01 RX ADMIN — HEPARIN SODIUM 2760 UNITS: 1000 INJECTION INTRAVENOUS; SUBCUTANEOUS at 00:02

## 2022-01-01 RX ADMIN — MORPHINE SULFATE 4 MG: 4 INJECTION, SOLUTION INTRAMUSCULAR; INTRAVENOUS at 21:49

## 2022-01-01 RX ADMIN — HYDROMORPHONE HYDROCHLORIDE 0.2 MG: 1 INJECTION, SOLUTION INTRAMUSCULAR; INTRAVENOUS; SUBCUTANEOUS at 16:40

## 2022-01-01 RX ADMIN — SODIUM CHLORIDE, PRESERVATIVE FREE 10 ML: 5 INJECTION INTRAVENOUS at 00:39

## 2022-01-01 RX ADMIN — SODIUM CHLORIDE, PRESERVATIVE FREE 10 ML: 5 INJECTION INTRAVENOUS at 07:32

## 2022-01-01 RX ADMIN — OXYCODONE 10 MG: 5 TABLET ORAL at 20:42

## 2022-01-01 RX ADMIN — ONDANSETRON HYDROCHLORIDE 4 MG: 2 SOLUTION INTRAMUSCULAR; INTRAVENOUS at 01:11

## 2022-01-01 RX ADMIN — HYDRALAZINE HYDROCHLORIDE 10 MG: 20 INJECTION, SOLUTION INTRAMUSCULAR; INTRAVENOUS at 16:32

## 2022-01-01 RX ADMIN — OXYCODONE 5 MG: 5 TABLET ORAL at 09:07

## 2022-01-01 RX ADMIN — ACETAMINOPHEN 650 MG: 325 TABLET, FILM COATED ORAL at 22:10

## 2022-01-01 RX ADMIN — ACETAMINOPHEN 650 MG: 325 TABLET, FILM COATED ORAL at 15:54

## 2022-01-01 RX ADMIN — DOXYCYCLINE 100 MG: 100 INJECTION, POWDER, LYOPHILIZED, FOR SOLUTION INTRAVENOUS at 23:59

## 2022-01-01 RX ADMIN — HYDROMORPHONE HYDROCHLORIDE 0.5 MG: 2 INJECTION, SOLUTION INTRAMUSCULAR; INTRAVENOUS; SUBCUTANEOUS at 14:48

## 2022-01-01 RX ADMIN — Medication 1 CAPSULE: at 10:34

## 2022-01-01 RX ADMIN — MIDAZOLAM 0.5 MG: 1 INJECTION INTRAMUSCULAR; INTRAVENOUS at 11:01

## 2022-01-01 RX ADMIN — ACETAMINOPHEN 650 MG: 325 TABLET, FILM COATED ORAL at 10:33

## 2022-01-01 RX ADMIN — ROCURONIUM BROMIDE 40 MG: 10 SOLUTION INTRAVENOUS at 12:33

## 2022-01-01 RX ADMIN — SODIUM CHLORIDE, PRESERVATIVE FREE 10 ML: 5 INJECTION INTRAVENOUS at 06:49

## 2022-01-01 RX ADMIN — MIDAZOLAM HYDROCHLORIDE 2 MG: 1 INJECTION, SOLUTION INTRAMUSCULAR; INTRAVENOUS at 01:25

## 2022-01-01 RX ADMIN — DEXAMETHASONE 4 MG: 4 TABLET ORAL at 08:14

## 2022-01-01 RX ADMIN — OXYCODONE HYDROCHLORIDE 10 MG: 10 TABLET, FILM COATED, EXTENDED RELEASE ORAL at 19:58

## 2022-01-01 RX ADMIN — MORPHINE SULFATE 2 MG: 2 INJECTION, SOLUTION INTRAMUSCULAR; INTRAVENOUS at 00:11

## 2022-01-01 RX ADMIN — OXYCODONE 10 MG: 5 TABLET ORAL at 05:27

## 2022-01-01 RX ADMIN — OXYCODONE HYDROCHLORIDE 10 MG: 10 TABLET, FILM COATED, EXTENDED RELEASE ORAL at 19:50

## 2022-01-01 RX ADMIN — SODIUM CHLORIDE, PRESERVATIVE FREE 10 ML: 5 INJECTION INTRAVENOUS at 20:44

## 2022-01-01 RX ADMIN — SODIUM CHLORIDE, POTASSIUM CHLORIDE, SODIUM LACTATE AND CALCIUM CHLORIDE 50 ML/HR: 600; 310; 30; 20 INJECTION, SOLUTION INTRAVENOUS at 10:11

## 2022-01-01 RX ADMIN — SODIUM CHLORIDE, PRESERVATIVE FREE 10 ML: 5 INJECTION INTRAVENOUS at 10:00

## 2022-01-01 RX ADMIN — PANTOPRAZOLE SODIUM 40 MG: 40 TABLET, DELAYED RELEASE ORAL at 06:30

## 2022-01-01 RX ADMIN — MIDAZOLAM HYDROCHLORIDE 2 MG: 1 INJECTION, SOLUTION INTRAMUSCULAR; INTRAVENOUS at 14:39

## 2022-01-01 RX ADMIN — HYDROMORPHONE HYDROCHLORIDE 0.2 MG: 1 INJECTION, SOLUTION INTRAMUSCULAR; INTRAVENOUS; SUBCUTANEOUS at 17:13

## 2022-01-01 RX ADMIN — ENOXAPARIN SODIUM 30 MG: 100 INJECTION SUBCUTANEOUS at 16:03

## 2022-01-01 RX ADMIN — OXYCODONE 5 MG: 5 TABLET ORAL at 19:02

## 2022-01-01 RX ADMIN — SODIUM BICARBONATE 1 MG: 42 INJECTION, SOLUTION INTRAVENOUS at 12:22

## 2022-01-01 RX ADMIN — ASPIRIN 81 MG: 81 TABLET, COATED ORAL at 06:49

## 2022-01-01 RX ADMIN — OXYCODONE HYDROCHLORIDE 10 MG: 10 TABLET, FILM COATED, EXTENDED RELEASE ORAL at 19:55

## 2022-01-01 RX ADMIN — MORPHINE SULFATE 2 MG: 2 INJECTION, SOLUTION INTRAMUSCULAR; INTRAVENOUS at 09:15

## 2022-01-01 RX ADMIN — ENOXAPARIN SODIUM 30 MG: 100 INJECTION SUBCUTANEOUS at 15:54

## 2022-01-01 RX ADMIN — MORPHINE SULFATE 4 MG: 4 INJECTION, SOLUTION INTRAMUSCULAR; INTRAVENOUS at 01:25

## 2022-01-01 RX ADMIN — PROPOFOL 75 MCG/KG/MIN: 10 INJECTION, EMULSION INTRAVENOUS at 12:27

## 2022-01-01 RX ADMIN — SODIUM CHLORIDE, PRESERVATIVE FREE 10 ML: 5 INJECTION INTRAVENOUS at 20:34

## 2022-01-01 RX ADMIN — OXYCODONE 5 MG: 5 TABLET ORAL at 13:44

## 2022-01-01 RX ADMIN — MIDAZOLAM HYDROCHLORIDE 2 MG: 1 INJECTION, SOLUTION INTRAMUSCULAR; INTRAVENOUS at 07:50

## 2022-01-01 RX ADMIN — ASPIRIN 81 MG: 81 TABLET, COATED ORAL at 05:27

## 2022-01-01 RX ADMIN — SODIUM CHLORIDE 25 ML/HR: 900 INJECTION, SOLUTION INTRAVENOUS at 10:41

## 2022-01-01 RX ADMIN — MORPHINE SULFATE 4 MG: 4 INJECTION, SOLUTION INTRAMUSCULAR; INTRAVENOUS at 18:35

## 2022-01-01 RX ADMIN — METOPROLOL TARTRATE 25 MG: 25 TABLET, FILM COATED ORAL at 10:35

## 2022-01-01 RX ADMIN — DEXAMETHASONE 4 MG: 4 TABLET ORAL at 08:20

## 2022-01-01 RX ADMIN — MORPHINE SULFATE 4 MG: 4 INJECTION, SOLUTION INTRAMUSCULAR; INTRAVENOUS at 12:24

## 2022-01-01 RX ADMIN — OXYCODONE 10 MG: 5 TABLET ORAL at 09:25

## 2022-01-01 RX ADMIN — ENOXAPARIN SODIUM 40 MG: 100 INJECTION SUBCUTANEOUS at 21:05

## 2022-01-01 RX ADMIN — OXYCODONE HYDROCHLORIDE 10 MG: 10 TABLET, FILM COATED, EXTENDED RELEASE ORAL at 19:27

## 2022-01-01 RX ADMIN — MORPHINE SULFATE 2 MG: 2 INJECTION, SOLUTION INTRAMUSCULAR; INTRAVENOUS at 17:58

## 2022-01-01 RX ADMIN — MORPHINE SULFATE 2 MG: 2 INJECTION, SOLUTION INTRAMUSCULAR; INTRAVENOUS at 12:58

## 2022-01-01 RX ADMIN — PANTOPRAZOLE SODIUM 40 MG: 40 TABLET, DELAYED RELEASE ORAL at 06:50

## 2022-01-01 RX ADMIN — SODIUM CHLORIDE, PRESERVATIVE FREE 10 ML: 5 INJECTION INTRAVENOUS at 17:43

## 2022-01-01 RX ADMIN — PANTOPRAZOLE SODIUM 40 MG: 40 TABLET, DELAYED RELEASE ORAL at 06:52

## 2022-01-01 RX ADMIN — PANTOPRAZOLE SODIUM 40 MG: 40 TABLET, DELAYED RELEASE ORAL at 05:27

## 2022-01-01 RX ADMIN — Medication 1 CAPSULE: at 08:14

## 2022-01-01 RX ADMIN — SODIUM CHLORIDE, PRESERVATIVE FREE 10 ML: 5 INJECTION INTRAVENOUS at 13:44

## 2022-01-01 RX ADMIN — DEXAMETHASONE SODIUM PHOSPHATE 4 MG: 4 INJECTION, SOLUTION INTRAMUSCULAR; INTRAVENOUS at 12:55

## 2022-01-01 RX ADMIN — FENTANYL CITRATE 25 MCG: 50 INJECTION, SOLUTION INTRAMUSCULAR; INTRAVENOUS at 11:01

## 2022-01-01 RX ADMIN — DEXAMETHASONE 4 MG: 4 TABLET ORAL at 13:44

## 2022-01-01 RX ADMIN — MORPHINE SULFATE 2 MG: 2 INJECTION, SOLUTION INTRAMUSCULAR; INTRAVENOUS at 05:31

## 2022-01-01 RX ADMIN — DEXMEDETOMIDINE HYDROCHLORIDE 12 MCG: 100 INJECTION, SOLUTION, CONCENTRATE INTRAVENOUS at 13:20

## 2022-01-01 RX ADMIN — SODIUM CHLORIDE, POTASSIUM CHLORIDE, SODIUM LACTATE AND CALCIUM CHLORIDE 100 ML/HR: 600; 310; 30; 20 INJECTION, SOLUTION INTRAVENOUS at 00:00

## 2022-01-01 RX ADMIN — Medication 2 MG: at 14:50

## 2022-01-01 RX ADMIN — FENTANYL CITRATE 25 MCG: 50 INJECTION, SOLUTION INTRAMUSCULAR; INTRAVENOUS at 17:02

## 2022-01-01 RX ADMIN — PANTOPRAZOLE SODIUM 40 MG: 40 TABLET, DELAYED RELEASE ORAL at 06:05

## 2022-01-01 RX ADMIN — SODIUM CHLORIDE 500 ML: 9 INJECTION, SOLUTION INTRAVENOUS at 16:53

## 2022-01-01 RX ADMIN — MORPHINE SULFATE 4 MG: 4 INJECTION, SOLUTION INTRAMUSCULAR; INTRAVENOUS at 20:22

## 2022-01-01 RX ADMIN — IPRATROPIUM BROMIDE AND ALBUTEROL SULFATE 3 ML: .5; 3 SOLUTION RESPIRATORY (INHALATION) at 09:05

## 2022-01-01 RX ADMIN — HYDROCODONE BITARTRATE AND ACETAMINOPHEN 1 TABLET: 7.5; 325 TABLET ORAL at 22:00

## 2022-01-01 RX ADMIN — PANTOPRAZOLE SODIUM 40 MG: 40 TABLET, DELAYED RELEASE ORAL at 06:44

## 2022-01-01 RX ADMIN — GLYCOPYRROLATE 0.3 MG: 0.2 INJECTION, SOLUTION INTRAMUSCULAR; INTRAVENOUS at 14:50

## 2022-01-01 RX ADMIN — MORPHINE SULFATE 4 MG: 4 INJECTION, SOLUTION INTRAMUSCULAR; INTRAVENOUS at 04:13

## 2022-01-01 RX ADMIN — ENOXAPARIN SODIUM 30 MG: 100 INJECTION SUBCUTANEOUS at 15:45

## 2022-01-01 RX ADMIN — IPRATROPIUM BROMIDE AND ALBUTEROL SULFATE 3 ML: .5; 3 SOLUTION RESPIRATORY (INHALATION) at 20:56

## 2022-01-01 RX ADMIN — FENTANYL CITRATE 25 MCG: 50 INJECTION, SOLUTION INTRAMUSCULAR; INTRAVENOUS at 17:10

## 2022-01-01 RX ADMIN — ASPIRIN 81 MG: 81 TABLET, COATED ORAL at 06:29

## 2022-01-01 RX ADMIN — HYDROMORPHONE HYDROCHLORIDE 0.5 MG: 2 INJECTION, SOLUTION INTRAMUSCULAR; INTRAVENOUS; SUBCUTANEOUS at 15:11

## 2022-01-01 RX ADMIN — MORPHINE SULFATE 4 MG: 4 INJECTION, SOLUTION INTRAMUSCULAR; INTRAVENOUS at 22:57

## 2022-01-01 RX ADMIN — ENOXAPARIN SODIUM 30 MG: 100 INJECTION SUBCUTANEOUS at 15:16

## 2022-01-01 RX ADMIN — SODIUM CHLORIDE, POTASSIUM CHLORIDE, SODIUM LACTATE AND CALCIUM CHLORIDE 100 ML/HR: 600; 310; 30; 20 INJECTION, SOLUTION INTRAVENOUS at 15:51

## 2022-01-01 RX ADMIN — ONDANSETRON HYDROCHLORIDE 4 MG: 2 SOLUTION INTRAMUSCULAR; INTRAVENOUS at 18:40

## 2022-01-01 RX ADMIN — ENOXAPARIN SODIUM 30 MG: 100 INJECTION SUBCUTANEOUS at 15:12

## 2022-01-01 RX ADMIN — MORPHINE SULFATE 2 MG: 2 INJECTION, SOLUTION INTRAMUSCULAR; INTRAVENOUS at 08:33

## 2022-01-01 RX ADMIN — SODIUM CHLORIDE, PRESERVATIVE FREE 10 ML: 5 INJECTION INTRAVENOUS at 06:28

## 2022-01-01 RX ADMIN — HYDROMORPHONE HYDROCHLORIDE 0.2 MG: 1 INJECTION, SOLUTION INTRAMUSCULAR; INTRAVENOUS; SUBCUTANEOUS at 16:46

## 2022-01-01 RX ADMIN — ENOXAPARIN SODIUM 40 MG: 100 INJECTION SUBCUTANEOUS at 22:00

## 2022-01-01 RX ADMIN — DOXYCYCLINE 100 MG: 100 INJECTION, POWDER, LYOPHILIZED, FOR SOLUTION INTRAVENOUS at 09:36

## 2022-01-01 RX ADMIN — MORPHINE SULFATE 2 MG: 2 INJECTION, SOLUTION INTRAMUSCULAR; INTRAVENOUS at 09:19

## 2022-01-01 RX ADMIN — METOPROLOL TARTRATE 25 MG: 25 TABLET, FILM COATED ORAL at 22:16

## 2022-01-01 RX ADMIN — METOPROLOL TARTRATE 25 MG: 25 TABLET, FILM COATED ORAL at 08:20

## 2022-01-01 RX ADMIN — SUCCINYLCHOLINE CHLORIDE 100 MG: 20 INJECTION, SOLUTION INTRAMUSCULAR; INTRAVENOUS at 12:27

## 2022-01-01 RX ADMIN — OXYCODONE 5 MG: 5 TABLET ORAL at 06:33

## 2022-01-01 RX ADMIN — DEXMEDETOMIDINE HYDROCHLORIDE 4 MCG: 100 INJECTION, SOLUTION, CONCENTRATE INTRAVENOUS at 13:00

## 2022-01-01 RX ADMIN — SODIUM CHLORIDE, PRESERVATIVE FREE 10 ML: 5 INJECTION INTRAVENOUS at 21:19

## 2022-01-01 RX ADMIN — HYDROCODONE BITARTRATE AND ACETAMINOPHEN 1 TABLET: 7.5; 325 TABLET ORAL at 06:49

## 2022-01-01 RX ADMIN — IPRATROPIUM BROMIDE AND ALBUTEROL SULFATE 3 ML: .5; 3 SOLUTION RESPIRATORY (INHALATION) at 08:55

## 2022-01-01 RX ADMIN — ACETAMINOPHEN 650 MG: 325 TABLET, FILM COATED ORAL at 08:34

## 2022-01-01 RX ADMIN — PANTOPRAZOLE SODIUM 40 MG: 40 TABLET, DELAYED RELEASE ORAL at 06:19

## 2022-01-01 RX ADMIN — LABETALOL HYDROCHLORIDE 20 MG: 5 INJECTION INTRAVENOUS at 15:30

## 2022-01-01 RX ADMIN — MIDAZOLAM 0.5 MG: 1 INJECTION INTRAMUSCULAR; INTRAVENOUS at 10:57

## 2022-01-01 RX ADMIN — METOPROLOL TARTRATE 25 MG: 25 TABLET, FILM COATED ORAL at 21:35

## 2022-01-01 RX ADMIN — Medication 1 CAPSULE: at 08:08

## 2022-01-01 RX ADMIN — HEPARIN SODIUM AND DEXTROSE 12 UNITS/KG/HR: 10000; 5 INJECTION INTRAVENOUS at 00:03

## 2022-01-01 RX ADMIN — OXYCODONE 5 MG: 5 TABLET ORAL at 18:15

## 2022-01-01 RX ADMIN — ACETAMINOPHEN 650 MG: 325 TABLET, FILM COATED ORAL at 21:39

## 2022-01-01 RX ADMIN — ROCURONIUM BROMIDE 10 MG: 10 SOLUTION INTRAVENOUS at 12:27

## 2022-01-01 RX ADMIN — MORPHINE SULFATE 4 MG: 4 INJECTION, SOLUTION INTRAMUSCULAR; INTRAVENOUS at 09:27

## 2022-01-01 RX ADMIN — MORPHINE SULFATE 2 MG: 2 INJECTION, SOLUTION INTRAMUSCULAR; INTRAVENOUS at 13:17

## 2022-01-01 RX ADMIN — SODIUM CHLORIDE, PRESERVATIVE FREE 10 ML: 5 INJECTION INTRAVENOUS at 15:11

## 2022-01-01 RX ADMIN — ACETAMINOPHEN 650 MG: 325 TABLET, FILM COATED ORAL at 21:54

## 2022-01-01 RX ADMIN — SODIUM CHLORIDE, PRESERVATIVE FREE 10 ML: 5 INJECTION INTRAVENOUS at 06:35

## 2022-01-01 RX ADMIN — ACETAMINOPHEN 650 MG: 325 TABLET, FILM COATED ORAL at 21:36

## 2022-01-01 RX ADMIN — MORPHINE SULFATE 2 MG: 2 INJECTION, SOLUTION INTRAMUSCULAR; INTRAVENOUS at 05:06

## 2022-01-01 RX ADMIN — MIDAZOLAM HYDROCHLORIDE 2 MG: 1 INJECTION, SOLUTION INTRAMUSCULAR; INTRAVENOUS at 12:26

## 2022-01-01 RX ADMIN — ASPIRIN 81 MG: 81 TABLET, COATED ORAL at 06:19

## 2022-01-01 RX ADMIN — METOPROLOL TARTRATE 25 MG: 25 TABLET, FILM COATED ORAL at 09:31

## 2022-01-01 RX ADMIN — MORPHINE SULFATE 2 MG: 2 INJECTION, SOLUTION INTRAMUSCULAR; INTRAVENOUS at 09:32

## 2022-01-01 RX ADMIN — DOCUSATE SODIUM 50MG AND SENNOSIDES 8.6MG 2 TABLET: 8.6; 5 TABLET, FILM COATED ORAL at 09:03

## 2022-01-01 RX ADMIN — DEXAMETHASONE 4 MG: 4 TABLET ORAL at 10:35

## 2022-01-01 RX ADMIN — MIDAZOLAM HYDROCHLORIDE 2 MG: 1 INJECTION, SOLUTION INTRAMUSCULAR; INTRAVENOUS at 10:29

## 2022-01-01 RX ADMIN — DEXAMETHASONE 4 MG: 4 TABLET ORAL at 09:03

## 2022-01-01 RX ADMIN — SODIUM CHLORIDE, PRESERVATIVE FREE 10 ML: 5 INJECTION INTRAVENOUS at 15:55

## 2022-01-01 RX ADMIN — ONDANSETRON HYDROCHLORIDE 4 MG: 2 SOLUTION INTRAMUSCULAR; INTRAVENOUS at 09:25

## 2022-01-01 RX ADMIN — OXYCODONE 5 MG: 5 TABLET ORAL at 11:49

## 2022-01-01 RX ADMIN — BISACODYL 10 MG RECTAL SUPPOSITORY 10 MG: at 08:46

## 2022-01-01 RX ADMIN — HYDROCODONE BITARTRATE AND ACETAMINOPHEN 1 TABLET: 7.5; 325 TABLET ORAL at 23:51

## 2022-01-01 RX ADMIN — MORPHINE SULFATE 2 MG: 2 INJECTION, SOLUTION INTRAMUSCULAR; INTRAVENOUS at 22:22

## 2022-01-01 RX ADMIN — MORPHINE SULFATE 4 MG: 4 INJECTION, SOLUTION INTRAMUSCULAR; INTRAVENOUS at 21:52

## 2022-01-01 RX ADMIN — HYDROCODONE BITARTRATE AND ACETAMINOPHEN 1 TABLET: 7.5; 325 TABLET ORAL at 18:22

## 2022-01-01 RX ADMIN — METOPROLOL TARTRATE 25 MG: 25 TABLET, FILM COATED ORAL at 20:42

## 2022-01-01 RX ADMIN — ASPIRIN 81 MG: 81 TABLET, COATED ORAL at 06:16

## 2022-01-01 RX ADMIN — ACETAMINOPHEN 650 MG: 325 TABLET, FILM COATED ORAL at 15:15

## 2022-01-01 RX ADMIN — HEPARIN SODIUM 2760 UNITS: 1000 INJECTION INTRAVENOUS; SUBCUTANEOUS at 06:35

## 2022-01-01 RX ADMIN — MORPHINE SULFATE 2 MG: 2 INJECTION, SOLUTION INTRAMUSCULAR; INTRAVENOUS at 19:07

## 2022-01-01 RX ADMIN — SODIUM CHLORIDE, PRESERVATIVE FREE 10 ML: 5 INJECTION INTRAVENOUS at 21:40

## 2022-01-01 RX ADMIN — DEXMEDETOMIDINE HYDROCHLORIDE 4 MCG: 100 INJECTION, SOLUTION, CONCENTRATE INTRAVENOUS at 13:59

## 2022-01-01 RX ADMIN — DOCUSATE SODIUM 50MG AND SENNOSIDES 8.6MG 2 TABLET: 8.6; 5 TABLET, FILM COATED ORAL at 08:33

## 2022-01-01 RX ADMIN — DEXTROSE AND SODIUM CHLORIDE 75 ML/HR: 5; 450 INJECTION, SOLUTION INTRAVENOUS at 15:28

## 2022-01-01 RX ADMIN — SODIUM CHLORIDE, PRESERVATIVE FREE 10 ML: 5 INJECTION INTRAVENOUS at 17:50

## 2022-01-01 RX ADMIN — HYDROMORPHONE HYDROCHLORIDE 0.5 MG: 2 INJECTION, SOLUTION INTRAMUSCULAR; INTRAVENOUS; SUBCUTANEOUS at 15:15

## 2022-01-01 RX ADMIN — FENTANYL CITRATE 100 MCG: 50 INJECTION, SOLUTION INTRAMUSCULAR; INTRAVENOUS at 12:27

## 2022-01-01 RX ADMIN — IPRATROPIUM BROMIDE AND ALBUTEROL SULFATE 3 ML: .5; 3 SOLUTION RESPIRATORY (INHALATION) at 07:50

## 2022-01-01 RX ADMIN — MIDAZOLAM HYDROCHLORIDE 2 MG: 1 INJECTION, SOLUTION INTRAMUSCULAR; INTRAVENOUS at 02:01

## 2022-01-01 RX ADMIN — HYDROCODONE BITARTRATE AND ACETAMINOPHEN 1 TABLET: 7.5; 325 TABLET ORAL at 10:50

## 2022-01-01 RX ADMIN — LIDOCAINE HYDROCHLORIDE 8 ML: 10 INJECTION, SOLUTION EPIDURAL; INFILTRATION; INTRACAUDAL; PERINEURAL at 11:15

## 2022-01-01 RX ADMIN — SODIUM BICARBONATE 2 ML: 42 INJECTION, SOLUTION INTRAVENOUS at 11:15

## 2022-01-01 RX ADMIN — MIDAZOLAM HYDROCHLORIDE 2 MG: 1 INJECTION, SOLUTION INTRAMUSCULAR; INTRAVENOUS at 18:11

## 2022-01-01 RX ADMIN — HYDROMORPHONE HYDROCHLORIDE 0.2 MG: 1 INJECTION, SOLUTION INTRAMUSCULAR; INTRAVENOUS; SUBCUTANEOUS at 16:53

## 2022-01-01 RX ADMIN — Medication 1 CAPSULE: at 09:03

## 2022-01-01 RX ADMIN — ASPIRIN 81 MG: 81 TABLET, COATED ORAL at 07:32

## 2022-01-01 RX ADMIN — ACETAMINOPHEN 650 MG: 325 TABLET, FILM COATED ORAL at 08:20

## 2022-01-01 RX ADMIN — ENOXAPARIN SODIUM 30 MG: 100 INJECTION SUBCUTANEOUS at 15:39

## 2022-01-01 RX ADMIN — HEPARIN SODIUM 5000 UNITS: 1000 INJECTION, SOLUTION INTRAVENOUS; SUBCUTANEOUS at 13:52

## 2022-01-01 RX ADMIN — ACETAMINOPHEN 650 MG: 325 TABLET, FILM COATED ORAL at 20:43

## 2022-01-01 RX ADMIN — SODIUM CHLORIDE, PRESERVATIVE FREE 10 ML: 5 INJECTION INTRAVENOUS at 15:15

## 2022-01-01 RX ADMIN — ASPIRIN 81 MG: 81 TABLET, COATED ORAL at 06:50

## 2022-01-01 RX ADMIN — MIDAZOLAM HYDROCHLORIDE 2 MG: 1 INJECTION, SOLUTION INTRAMUSCULAR; INTRAVENOUS at 20:54

## 2022-01-01 RX ADMIN — METOPROLOL TARTRATE 25 MG: 25 TABLET, FILM COATED ORAL at 08:34

## 2022-01-01 RX ADMIN — METOPROLOL TARTRATE 25 MG: 25 TABLET, FILM COATED ORAL at 09:03

## 2022-01-01 RX ADMIN — MIDAZOLAM 2 MG: 1 INJECTION INTRAMUSCULAR; INTRAVENOUS at 12:21

## 2022-01-01 RX ADMIN — LIDOCAINE HYDROCHLORIDE 10 ML: 10 SOLUTION INTRAVENOUS at 12:21

## 2022-01-01 RX ADMIN — SODIUM CHLORIDE, POTASSIUM CHLORIDE, SODIUM LACTATE AND CALCIUM CHLORIDE 100 ML/HR: 600; 310; 30; 20 INJECTION, SOLUTION INTRAVENOUS at 03:14

## 2022-04-25 NOTE — PERIOP NOTES
SMOKING CESSATION INFORMATION GIVEN TO PATIENT. PT INSTRUCTED TO HAVE CXR AFTER LEAVING PAT. SURGICAL CONSENT OBTAINED IN PAT.

## 2022-04-25 NOTE — PERIOP NOTES
[address] INSTRUCTIONS    Surgery Date:   04/28/2022    [facility] staff will call you between 4 PM- 8 PM the day before surgery with your arrival time. If your surgery is on a Monday, we will call you the preceding Friday. Please call [phone] after 8 PM if you did not receive your arrival time. 1. Please report to [facility] Patient Access/Admitting on the 1st floor. Bring your insurance card, photo identification, and any copayment ( if applicable). 2. If you are going home the same day of your surgery, you must have a responsible adult to drive you home. You need to have a responsible adult to stay with you the first 24 hours after surgery and you should not drive a car for 24 hours following your surgery. 3. Nothing to eat or drink after midnight the night before surgery. This includes no water, gum, mints, coffee, juice, etc.  Please note special instructions, if applicable, below for medications. 4. Do NOT drink alcohol or smoke 24 hours before surgery. STOP smoking for 14 days prior as it helps with breathing and healing after surgery. 5. If you are being admitted to the hospital, please leave personal belongings/luggage in your car until you have an assigned hospital room number. 6. Please wear comfortable clothes. Wear your glasses instead of contacts. We ask that all money, jewelry and valuables be left at home. Wear no make up, particularly mascara, the day of surgery. 7.  All body piercings, rings, and jewelry need to be removed and left at home. Please remove any nail polish or artificial nails from your fingernails. Please wear your hair loose or down. Please no pony-tails, buns, or any metal hair accessories. If you shower the morning of surgery, please do not apply any lotions or powders afterwards. You may wear deodorant, unless having breast surgery. Do not shave any body area within 24 hours of your surgery.   8. Please follow all instructions to avoid any potential surgical cancellation. 9. Should your physical condition change, (i.e. fever, cold, flu, etc.) please notify your surgeon as soon as possible. 10. It is important to be on time. If a situation occurs where you may be delayed, please call:  [phone] / [phone] on the day of surgery. 11. The Preadmission Testing staff can be reached at [phone]. 12. Special instructions: N/A      Current Outpatient Medications   Medication Sig    aspirin delayed-release 81 mg tablet Take 81 mg by mouth every morning.  gabapentin (NEURONTIN) 100 mg capsule 1 in the morning, 1 in the afternoon and 2 at bedtime. Indications: neuropathic pain (Patient not taking: Reported on 4/25/2022)     No current facility-administered medications for this encounter. 1. YOU MUST ONLY TAKE THESE MEDICATIONS THE MORNING OF SURGERY WITH A SIP OF WATER: N/A  2. MEDICATIONS TO TAKE THE MORNING OF SURGERY ONLY IF NEEDED: N/A  3. HOLD these prescription medications BEFORE Surgery: N/A  4. Ask your surgeon/prescribing physician about when/if to STOP taking these medications: ASPIRIN  5. Stop all vitamins, herbal medicines and Aspirin containing products 7 days prior to surgery. Stop any non-steroidal anti-inflammatory drugs (i.e. Ibuprofen, Naproxen, Advil, Aleve) 3 days before surgery. You may take Tylenol. 6. If you are currently taking Plavix, Coumadin, or any other blood-thinning/anticoagulant medication contact your prescribing physician for instructions. Preventing Infections Before and After - Your Surgery    IMPORTANT INSTRUCTIONS      You play an important role in your health and preparation for surgery. To reduce the germs on your skin you will need to shower with CHG soap (Chorhexidine gluconate 4%) two times before surgery. CHG soap (Hibiclens, Hex-A-Clens or store brand)   CHG soap will be provided at your Preadmission Testing (PAT) appointment.    If you do not have a PAT appointment before surgery, you may arrange to  CHG soap from our office or purchase CHG soap at a pharmacy, grocery or department store.  You need to purchase TWO 4 ounce bottles to use for your 2 showers. Steps to follow:  1. Wash your hair with your normal shampoo and your body with regular soap and rinse well to remove shampoo and soap from your skin. 2. Wet a clean washcloth and turn off the shower. 3. Put CHG soap on washcloth and apply to your entire body from the neck down. Do not use on your head, face or private parts(genitals). Do not use CHG soap on open sores, wounds or areas of skin irritation. 4. Wash you body gently for 5 minutes. Do not wash your skin too hard. This soap does not create lather. Pay special attention to your underarms and from your belly button to your feet. 5. Turn the shower back on and rinse well to get CHG soap off your body. 6. Pat your skin dry with a clean, dry towel. Do not apply lotions or moisturizer. 7. Put on clean clothes and sleep on fresh bed sheets and do not allow pets to sleep with you. Shower with CHG soap 2 times before your surgery   The evening before your surgery   The morning of your surgery      Tips to help prevent infections after your surgery:  1. Protect your surgical wound from germs:  ? Hand washing is the most important thing you and your caregivers can do to prevent infections. ? Keep your bandage clean and dry! ? Do not touch your surgical wound. 2. Use clean, freshly washed towels and washcloths every time you shower; do not share bath linens with others. 3. Until your surgical wound is healed, wear clothing and sleep on bed linens each day that are clean and freshly washed. 4. Do not allow pets to sleep in your bed with you or touch your surgical wound. 5. Do not smoke - smoking delays wound healing. This may be a good time to stop smoking.   6. If you have diabetes, it is important for you to manage your blood sugar levels properly before your surgery as well as after your surgery. Poorly managed blood sugar levels slow down wound healing and prevent you from healing completely. Patient Information Regarding COVID Restrictions    Day of Procedure     Please park in the parking deck or any designated visitor parking lot.  Enter the facility through the [facility] of the [facility].   On the day of surgery, please provide the cell phone number of the person who will be waiting for you to the Patient Access representative at the time of registration.  Please wear a mask on the day of your procedure.  We are now allowing two designated visitors per stay. Pediatric patients may have 2 designated visitors. These two people may come in with you on the day of your procedure.  No visitors under the age of 13.  The designated visitor must also wear a mask.  Once your procedure and the immediate recovery period is completed, a nurse in the recovery area will contact your designated visitor to inform them of your room number or to otherwise review other pertinent information regarding your care.  Social distancing practices are to be adhered to in waiting areas and the cafeteria. The patient was contacted  in person. He verbalized understanding of all instructions does not  need reinforcement.

## 2022-04-28 PROBLEM — I70.219 ATHEROSCLEROTIC PVD WITH INTERMITTENT CLAUDICATION (HCC): Status: ACTIVE | Noted: 2022-01-01

## 2022-04-28 NOTE — OP NOTES
[facility]  OPERATIVE REPORT    Name:  Rayo Saenz  MR#:  [id number]  :  1960  ACCOUNT #:  [de-identified]  DATE OF SERVICE:  2022    PREOPERATIVE DIAGNOSIS:  Peripheral vascular disease with ischemic rest pain, left foot. POSTOPERATIVE DIAGNOSIS:  Peripheral vascular disease with ischemic rest pain, left foot. PROCEDURE PERFORMED:  Left proximal superficial femoral artery to proximal peroneal artery bypass using nonreversed saphenous vein. SURGEON:  Inga Lei MD    ASSISTANT:  Rosie    ANESTHESIA:  General.    COMPLICATIONS:  None. SPECIMENS REMOVED:  None. IMPLANTS:  None. ESTIMATED BLOOD LOSS:  25 mL. INDICATIONS:  The patient is a 49-year-old male with significant peripheral vascular disease who recently underwent bilateral iliac stent placement as well as stent placement in his occluded popliteal artery. The stent thrombosed soon after placement. He has persistent left foot rest pain. He will undergo open revascularization. PROCEDURE:  The patient's left leg was prepped and draped. A longitudinal incision was made just below the left knee in the medial proximal lower leg. The incision was deepened into the popliteal space. The popliteal artery was accessed using a butterfly needle and an arteriogram was done that showed a patent below-knee popliteal artery with significant stenosis of the tibioperoneal trunk. The posterior tibial artery was occluded further downstream.  The anterior tibial artery was patent but small. The peroneal artery is the primary runoff vessel to the lower leg and bypass to the proximal peroneal was planned. The saphenous vein was identified through the lower leg incision. The vein was of good size in the most proximal part of the incision but was too small to be used further distally.   Using three interrupted longitudinal incisions beginning in the left groin, the saphenous vein was harvested from the saphenofemoral junction to just below the knee. The vein was of reasonable size and quality. Side branches were ligated with silk ties. There was adequate length to reach from the proximal superficial femoral to the peroneal artery. The proximal superficial femoral artery was dissected free through the proximal thigh incision. The patient was heparinized. The vein graft was sewn end-to-side to the superficial femoral artery using running 5-0 Prolene. The vein was oriented in a nonreversed fashion. The valves within the vein graft were then cut using a valvulotome. The graft was tunneled subfascially from the proximal thigh to the distal thigh and then through the popliteal space. It was brought to lie alongside the peroneal artery and was sewn end-to-side using running 6-0 Prolene. Prior to doing this, the valves within the vein graft were cut using a valvulotome establishing good flow through the graft. The incisions were then closed with Vicryl subcutaneous and fascial sutures and skin staples. Dressings were applied and the patient was returned to the recovery room in stable condition.       MD YVONNE [id number]  D:  04/28/2022 15:00  T:  04/28/2022 18:15  JOB #:  [id number]

## 2022-04-28 NOTE — ANESTHESIA PREPROCEDURE EVALUATION
Relevant Problems   No relevant active problems       Anesthetic History   No history of anesthetic complications            Review of Systems / Medical History  Patient summary reviewed, nursing notes reviewed and pertinent labs reviewed    Pulmonary  Within defined limits                 Neuro/Psych   Within defined limits           Cardiovascular  Within defined limits                Exercise tolerance: >4 METS     GI/Hepatic/Renal     GERD           Endo/Other        Arthritis     Other Findings              Physical Exam    Airway  Mallampati: I  TM Distance: 4 - 6 cm  Neck ROM: normal range of motion   Mouth opening: Normal     Cardiovascular    Rhythm: regular  Rate: normal         Dental    Dentition: Edentulous     Pulmonary  Breath sounds clear to auscultation               Abdominal  GI exam deferred       Other Findings            Anesthetic Plan    ASA: 3  Anesthesia type: general          Induction: Intravenous  Anesthetic plan and risks discussed with: Patient

## 2022-04-28 NOTE — PROGRESS NOTES
TRANSFER - OUT REPORT:    Verbal report given to KEELY Vega(name) on Shena Espinal  being transferred to (unit) for routine post - op       Report consisted of patients Situation, Background, Assessment and   Recommendations(SBAR). Information from the following report(s) SBAR, Kardex, OR Summary, Procedure Summary, Intake/Output, MAR and Cardiac Rhythm Sinus was reviewed with the receiving nurse. Lines:   Peripheral IV 04/28/22 Posterior;Right Forearm (Active)   Site Assessment Clean, dry, & intact 04/28/22 1515   Phlebitis Assessment 0 04/28/22 1515   Infiltration Assessment 0 04/28/22 1515   Dressing Status Clean, dry, & intact 04/28/22 1515   Dressing Type Transparent 04/28/22 1515   Hub Color/Line Status Green 04/28/22 1515   Action Taken Open ports on tubing capped 04/28/22 1515   Alcohol Cap Used Yes 04/28/22 1515       Peripheral IV 04/28/22 Anterior;Proximal;Right Forearm (Active)   Site Assessment Clean, dry, & intact 04/28/22 1515   Phlebitis Assessment 0 04/28/22 1515   Infiltration Assessment 0 04/28/22 1515   Dressing Status Clean, dry, & intact 04/28/22 1515   Dressing Type Transparent 04/28/22 1515   Hub Color/Line Status Green 04/28/22 1515   Action Taken Open ports on tubing capped 04/28/22 1515   Alcohol Cap Used Yes 04/28/22 1515        Opportunity for questions and clarification was provided.       Patient transported with:   [employer]

## 2022-04-28 NOTE — BRIEF OP NOTE
Brief Postoperative Note    Patient: Misael Cuello  YOB: 1960  MRN: [id number]    Date of Procedure: 4/28/2022     Pre-Op Diagnosis: ATHEROSCLEROSIS WITH REST PAIN    Post-Op Diagnosis: Same as preoperative diagnosis.       Procedure(s):  LEFT FEMORAL-PERONEAL BYPASS WITH VEIN    Surgeon(s):  Domonique Cabrera MD    Surgical Assistant: Surg Asst-1: Radha Liu    Anesthesia: General     Estimated Blood Loss (mL): less than 50     Complications: None    Specimens: * No specimens in log *     Implants: * No implants in log *    Drains: * No LDAs found *    Findings: good vein    Electronically Signed by Ventura Daigle MD on 4/28/2022 at 2:52 PM

## 2022-04-29 NOTE — ANESTHESIA POSTPROCEDURE EVALUATION
Post-Anesthesia Evaluation and Assessment    Patient: Andrew De Jesus MRN: [id number]  SSN: [id number]    YOB: 1960  Age: 64 y.o. Sex: male      I have evaluated the patient and they are stable and ready for discharge from the PACU. Cardiovascular Function/Vital Signs  Visit Vitals  /68   Pulse 80   Temp 37 °C (98.6 °F)   Resp 18   Ht 5' 8\" (1.727 m)   Wt 52.4 kg (115 lb 8.3 oz)   SpO2 94%   BMI 17.56 kg/m²       Patient is status post General anesthesia for Procedure(s):  LEFT FEMORAL-PERONEAL BYPASS WITH LEG GRAFT. Nausea/Vomiting: None    Postoperative hydration reviewed and adequate. Pain:  Pain Scale 1: Numeric (0 - 10) (04/29/22 9570)  Pain Intensity 1: 5 (04/29/22 6537)   Managed    Neurological Status:   Neuro (WDL): Exceptions to WDL (04/28/22 1715)  Neuro  Neurologic State: Drowsy; Eyes open spontaneously; Eyes open to voice (04/28/22 1715)  Orientation Level: Oriented X4 (04/28/22 1715)  Cognition: Follows commands (04/28/22 1715)  Speech: Clear (04/28/22 1715)  LUE Motor Response: Purposeful (04/28/22 1715)  LLE Motor Response: Numbness;Tingling (04/28/22 2106)  RUE Motor Response: Purposeful (04/28/22 1715)  RLE Motor Response: Purposeful (04/28/22 1715)   At baseline    Mental Status, Level of Consciousness: Alert and  oriented to person, place, and time    Pulmonary Status:   O2 Device: None (Room air) (04/28/22 2106)   Adequate oxygenation and airway patent    Complications related to anesthesia: None    Post-anesthesia assessment completed. No concerns    Signed By: Fide Lorenzo MD     April 29, 2022              Procedure(s):  LEFT FEMORAL-PERONEAL BYPASS WITH LEG GRAFT. general    <BSHSIANPOST>    INITIAL Post-op Vital signs:   Vitals Value Taken Time   /74 04/28/22 1730   Temp 36.7 °C (98.1 °F) 04/28/22 1600   Pulse 64 04/28/22 1737   Resp 8 04/28/22 1737   SpO2 97 % 04/28/22 1737   Vitals shown include unvalidated device data.

## 2022-04-29 NOTE — PROGRESS NOTES
NUTRITION brief  Recommendations:   1. Continue current diet     Diet: Regular  Supplements/Nutrition Support: None  Nutrition-related meds: Lovenox, norco    New events impacting nutrition plan of care:   Visited pt at bedside. He reported okay intake during his admission stating that he wasn't a fan of the food, he was not interested in trying a supplement. Reports good intake at home, noted he wears dentures but has no issues chewing without them. Denied any wt loss, reported being 118-120 lbs his whole life. No nutrition intervention needed at this time.      Estimated Nutrition Needs:   Energy: 8047-3698 (30-35 kcal/kg)  Wt used: Current  Protein: 79-89 (1.5-1.7 g/kg)  Wt used: Current   Fluid: 1800 ml     Cris Fuel, Dietetic Intern

## 2022-04-29 NOTE — PROGRESS NOTES
Problem: Mobility Impaired (Adult and Pediatric)  Goal: *Acute Goals and Plan of Care (Insert Text)  Description: FUNCTIONAL STATUS PRIOR TO ADMISSION: Pt indep without use of device at baseline. Continues to work in yue in spite of L LE pain due to need for income. Pt provides support to brother who has COPD, receives [facility] PT, and is also hospitalized at this time. HOME SUPPORT PRIOR TO ADMISSION: The patient lived with brother but did not require assist.    Physical Therapy Goals  Initiated 4/29/2022  1. Patient will move from supine to sit and sit to supine  in bed with modified independence within 7 day(s). 2.  Patient will transfer from bed to chair and chair to bed with modified independence using the least restrictive device within 7 day(s). 3.  Patient will perform sit to stand with modified independence within 7 day(s). 4.  Patient will ambulate with modified independence for 75 feet with the least restrictive device within 7 day(s). 5.  Patient will ascend/descend 13 stairs with bilat handrail(s) with supervision/set-up within 7 day(s). Outcome: Not Met   PHYSICAL THERAPY EVALUATION  Patient: Adithya Anguiano (5 y.o. male)  Date: 4/29/2022  Primary Diagnosis: Atherosclerotic PVD with intermittent claudication ([facility]) [I70.219]  Procedure(s) (LRB):  LEFT FEMORAL-PERONEAL BYPASS WITH LEG GRAFT (Left) 1 Day Post-Op   Precautions:        ASSESSMENT  Based on the objective data described below, the patient presents with post-op pain/ strength/ ROM deficits L LE, gait dysfunction, decreased activity tolerance as compared to baseline s/p L fem-peroneal bypass with vein graft POD#1. Pt apprehensive about moving L LE, but tolerated amb in room with RW and SBA. Maintained NWB L LE initially due to pain, but progressed to touch down WB by end of gait trial.     Discussed stair negotiation as pt lives in second floor apt. Reports use of step-to pattern and bilat HR at baseline.  Will benefit from mobilization as tolerated with RW and nursing staff. Will con't PT for gait progression to include stairs. [facility], however no interested at this time. Current Level of Function Impacting Discharge (mobility/balance): bed mob supervision, transfers/ short distance amb with RW SBA    Other factors to consider for discharge:  stair requirement to enter apt     Patient will benefit from skilled therapy intervention to address the above noted impairments. PLAN :  Recommendations and Planned Interventions: bed mobility training, transfer training, gait training, therapeutic exercises, patient and family training/education, and therapeutic activities      Frequency/Duration: Patient will be followed by physical therapy:  5 times a week to address goals. Recommendation for discharge: (in order for the patient to meet his/her long term goals)  Physical therapy at least 2 days/week in the home     This discharge recommendation:  Has not yet been discussed the attending provider and/or case management    IF patient discharges home will need the following DME: rolling walker (order placed)         SUBJECTIVE:   Patient stated I've got to leave here Sunday one way or the other; that's the only day I have a ride.     OBJECTIVE DATA SUMMARY:   HISTORY:    Past Medical History:   Diagnosis Date    Arthritis     GERD (gastroesophageal reflux disease)      Past Surgical History:   Procedure Laterality Date    HX APPENDECTOMY      AS CHILD    HX ENDOSCOPY      1's    HX VASCULAR STENT      X3 STENTS GROIN    HX WISDOM TEETH EXTRACTION      ALL TEETH REMOVED       Personal factors and/or comorbidities impacting plan of care: provides support to brother with whom he lives    Home Situation  Home Environment: Apartment  # Steps to Enter: 21  Rails to Enter: Yes  Hand Rails : Bilateral  One/Two Story Residence: One story  Living Alone: No (brother who is unable to A and requires support)  Patient Expects to be Discharged to[de-identified] Home  Current DME Used/Available at Home: None    EXAMINATION/PRESENTATION/DECISION MAKING:   Critical Behavior:  Neurologic State: Laurian Offer open spontaneously,Eyes open to voice  Orientation Level: Oriented X4  Cognition: Follows commands     Skin:  dressing in c/d/i L LE    Range Of Motion:  AROM:  (decreased knee flex L 2* pain)                       Strength:    Strength:  (decreased L LE 2* surgery/ pain)                    Tone & Sensation:   Tone: Normal              Sensation: Impaired (paresthesias L foot)               Coordination:  Coordination:  (decreased L LE)       Functional Mobility:  Bed Mobility:     Supine to Sit: Supervision  Sit to Supine: Supervision     Transfers:  Sit to Stand: Stand-by assistance  Stand to Sit: Stand-by assistance                       Balance:   Sitting: Intact  Standing: Impaired  Standing - Static: Good;Constant support (RW)  Standing - Dynamic : Fair;Constant support (RW)  Ambulation/Gait Training:  Distance (ft): 12 Feet (ft)  Assistive Device: Walker, rolling  Ambulation - Level of Assistance: Stand-by assistance        Gait Abnormalities: Antalgic (tolerating only light touch down WB L LE)           Stance: Left decreased  Speed/Marine: Slow  Step Length: Left shortened  Swing Pattern: Left asymmetrical           Physical Therapy Evaluation Charge Determination   History Examination Presentation Decision-Making   MEDIUM  Complexity : 1-2 comorbidities / personal factors will impact the outcome/ POC  LOW Complexity : 1-2 Standardized tests and measures addressing body structure, function, activity limitation and / or participation in recreation  LOW Complexity : Stable, uncomplicated  LOW Complexity : FOTO score of       Based on the above components, the patient evaluation is determined to be of the following complexity level: LOW     Pain Rating:  Pt reports L LE pain which increases with all movement    Activity Tolerance: Fair    After treatment patient left in no apparent distress:   Supine in bed, Call bell within reach, Side rails x 3, and HOB elevated    COMMUNICATION/EDUCATION:   The patients plan of care was discussed with: Registered nurse. Fall prevention education was provided and the patient/caregiver indicated understanding., Patient/family have participated as able in goal setting and plan of care. , and Patient/family agree to work toward stated goals and plan of care.     Thank you for this referral.  Aicha Myers, PT   Time Calculation: 17 mins

## 2022-04-29 NOTE — PROGRESS NOTES
Transition of Care: home with f/u with specialist and New walker from [place]; this CM delivered new walker to patient in his hospital room on 4/29/22    NOTE: patient is refusing any home health; attending made aware of this    Transport Plan: in car with family; NOTE; patient is adamant on leaving hospital on Giovanni May 1st)     RUR: 3%    Main contact is Nav Carrera- [phone]    Discharge pending:  -patient is POD#1 left leg bypass  -pending medical progress    1215: this CM met with patient at bedside; he is alert and oriented x 4; patient lives at stated address; it has 20 steps to a 2nd level apartment; patient is normally independent in his ADLs; patient states he has no PCP and does not want one; preferred pharmacy is the [facility] and the [facility]; patient states he needs to be discharged from the hospital by Giovanni May 1; this CM noted walker order; sent referral via allscripts to [facility]    1245: [place] accepted referral for the walker    1245: this CM delivered walker to patient and sent perfectserve message to attending about patients refusal of [facility], new walker and patients need to discharge by Sunday    Reason for Admission:  PVD/scheduled vascular surgery                     RUR Score:    3%                 Plan for utilizing home health:    It was recommended by PT but patient is refusing it       PCP: First and Last name:  None     Name of Practice: patient does not have PCP nor is interested in getting one   Are you a current patient: Yes/No: n/a   Approximate date of last visit: n/a   Can you participate in a virtual visit with your PCP: n/a                    Current Advanced Directive/Advance Care Plan: Full Code      Healthcare Decision Maker:   Click here to complete [address] including selection of the Healthcare Decision Maker Relationship (ie \"Primary\")                             Transition of Care Plan:   Likely home with f/u with specialist and new walker; transport in car with family (NOTE: patient refusing [facility])       Care Management Interventions  PCP Verified by CM: No (pt states he does not have PCP nor does he want one)  Mode of Transport at Discharge:  Other (see comment) (in car with family)  Transition of Care Consult (CM Consult): DME/Supply Assistance  Discharge Durable Medical Equipment: Yes (walker)  Support Systems: Child(charleen),Other Family Member(s)  Discharge Location  Patient Expects to be Discharged to[de-identified] Other: (likely home with f/u with specialiat; patient states he does not want [facility])     CM following  Davian Dang, RN, CRM

## 2022-04-29 NOTE — PROGRESS NOTES
Vascular:    Doing well POD #1 after left leg bypass    Foot pain much better    Foot warm, dressings intact    Mobilize as tolerated.  Home when ambulatory

## 2022-04-29 NOTE — PROGRESS NOTES
CM attempted to speak with the patient via room phone, no answer, Call placed to patient's emergency contact, [facility] [phone] and a voice message was left requesting a return phone call.       10:40 AM  SATURNINO Mcguire Weight bearing as tolerated

## 2022-04-30 NOTE — PROGRESS NOTES
Doing well  Ambulating some  Leg warm and well perfused  Thinks he'll be ready to go home tomorrow. Continue as is in the meantime.

## 2022-05-01 NOTE — DISCHARGE INSTRUCTIONS
Patient Discharge Instructions    Adali Gutierrez / [id number] : 1960    Admitted 2022 Discharged: 2022     Take Home Medications            · It is important that you take the medication exactly as they are prescribed. · Keep your medication in the bottles provided by the pharmacist and keep a list of the medication names, dosages, and times to be taken in your wallet. · Do not take other medications without consulting your doctor. What to do at Home    Recommended diet: Regular Diet,     Recommended activity: Activity as tolerated,      Follow-up with Dr Birgit Carney in 2 weeks        Information obtained by :  I understand that if any problems occur once I am at home I am to contact my physician. I understand and acknowledge receipt of the instructions indicated above.                                                                                                                                            Physician's or R.N.'s Signature                                                                  Date/Time                                                                                                                                              Patient or Representative Signature                                                          Date/Time

## 2022-05-04 NOTE — DISCHARGE SUMMARY
Malik Ortega [address] SUMMARY    Name:  [address]  MR#:  [id number]  :  1960  ACCOUNT #:  [de-identified]  ADMIT DATE:  2022  DISCHARGE DATE:  2022      FINAL DIAGNOSIS:  Peripheral vascular disease with ischemic rest pain, left foot. PROCEDURE:  Left femoral-peroneal bypass. HISTORY:  The patient is a 80-year-old male with significant peripheral vascular disease, who had previously undergone bilateral iliac stent placement and left popliteal artery stent placement. His left popliteal stent thrombosed. He is now admitted for open revascularization due to recurrent rest pain. HOSPITAL COURSE:  The patient was admitted and taken to the operating room where he underwent a left leg bypass. His postoperative course was uncomplicated. The bypass functioned well with resolution of his left foot pain. He was discharged home on 2022 once he was able to return to an ambulatory status. He was discharged in stable clinical condition. At the time of discharge, his incisions are healing well and he has patent graft with warm, well-perfused foot. He is discharged on a cardiac diet, activity as tolerated, his usual medications with the addition of Norco for pain and follow up in my office in 2 weeks. DISPOSITION:  Home.       Srinivas Oneal MD      [id number]  D:  2022 9:17  T:  2022 1:42  JOB #:  [id number]

## 2022-08-27 PROBLEM — I21.4 NSTEMI (NON-ST ELEVATED MYOCARDIAL INFARCTION) (HCC): Status: ACTIVE | Noted: 2022-01-01

## 2022-08-27 NOTE — ED PROVIDER NOTES
80-year-old male with history of arthritis, GERD, peripheral vascular disease with revascularization surgery in April of this year to his left lower extremity presents to the emergency department chief complaint of shoulder and chest pain with shortness of breath. Tells me the symptoms have been going on for weeks but have worsened in the past few days, particularly after he carried some groceries. He denies any history of cardiovascular disease. The history is provided by the patient and medical records. Arm Pain   This is a new problem. The current episode started more than 1 week ago. The problem occurs constantly. The problem has been rapidly worsening. The pain is moderate. Pertinent negatives include no numbness and full range of motion. The symptoms are aggravated by palpation. There has been no history of extremity trauma. Leg Pain   Pertinent negatives include no numbness and full range of motion. Shortness of Breath  Associated symptoms include leg pain. Pertinent negatives include no fever, no headaches, no sore throat, no cough, no chest pain, no vomiting, no abdominal pain, no rash and no leg swelling.       Past Medical History:   Diagnosis Date    Arthritis     GERD (gastroesophageal reflux disease)        Past Surgical History:   Procedure Laterality Date    HX APPENDECTOMY      AS CHILD    HX ENDOSCOPY      1990's    HX VASCULAR STENT      X3 STENTS GROIN    HX WISDOM TEETH EXTRACTION      ALL TEETH REMOVED         Family History:   Problem Relation Age of Onset    Heart Disease Mother         HEART ATTACK     Emphysema Father     No Known Problems Sister     COPD Brother     Lung Disease Brother     Other Brother         MOTOR VEHICLE ACCIDENT    No Known Problems Daughter     No Known Problems Daughter     No Known Problems Daughter     No Known Problems Son     Anesth Problems Neg Hx        Social History     Socioeconomic History    Marital status: SINGLE     Spouse name: Not on file Number of children: Not on file    Years of education: Not on file    Highest education level: Not on file   Occupational History    Not on file   Tobacco Use    Smoking status: Every Day     Packs/day: 0.50     Years: 40.00     Pack years: 20.00     Types: Cigarettes    Smokeless tobacco: Never   Vaping Use    Vaping Use: Never used   Substance and Sexual Activity    Alcohol use: Yes     Alcohol/week: 2.0 standard drinks     Types: 2 Cans of beer per week     Comment: DAILY    Drug use: Yes     Types: Marijuana    Sexual activity: Not on file   Other Topics Concern    Not on file   Social History Narrative    Not on file     Social Determinants of Health     Financial Resource Strain: Not on file   Food Insecurity: Not on file   Transportation Needs: Not on file   Physical Activity: Not on file   Stress: Not on file   Social Connections: Not on file   Intimate Partner Violence: Not on file   Housing Stability: Not on file         ALLERGIES: Patient has no known allergies. Review of Systems   Constitutional:  Positive for fatigue. Negative for fever. HENT:  Negative for sneezing and sore throat. Respiratory:  Positive for shortness of breath. Negative for cough. Cardiovascular:  Negative for chest pain and leg swelling. Gastrointestinal:  Negative for abdominal pain, diarrhea, nausea and vomiting. Genitourinary:  Negative for difficulty urinating and dysuria. Musculoskeletal:  Negative for arthralgias and myalgias. Skin:  Negative for color change and rash. Neurological:  Negative for weakness, numbness and headaches. Psychiatric/Behavioral:  Negative for agitation and behavioral problems. Vitals:    08/27/22 1608   BP: 120/79   Pulse: (!) 112   Resp: 18   Temp: 99.1 °F (37.3 °C)   SpO2: 90%   Weight: 46 kg (101 lb 6.6 oz)   Height: 5' 8\" (1.727 m)            Physical Exam  Vitals and nursing note reviewed. Constitutional:       General: He is not in acute distress.      Appearance: Normal appearance. He is well-developed. He is not ill-appearing, toxic-appearing or diaphoretic. HENT:      Head: Normocephalic and atraumatic. Nose: Nose normal.      Mouth/Throat:      Mouth: Mucous membranes are moist.      Pharynx: Oropharynx is clear. Eyes:      Extraocular Movements: Extraocular movements intact. Conjunctiva/sclera: Conjunctivae normal.      Pupils: Pupils are equal, round, and reactive to light. Cardiovascular:      Rate and Rhythm: Regular rhythm. Tachycardia present. Pulses: Normal pulses. Pulmonary:      Effort: Pulmonary effort is normal. No respiratory distress. Breath sounds: Normal breath sounds. No wheezing. Chest:      Chest wall: No mass or tenderness. Abdominal:      General: There is no distension. Palpations: Abdomen is soft. Tenderness: There is no abdominal tenderness. There is no guarding or rebound. Musculoskeletal:         General: No swelling, tenderness, deformity or signs of injury. Normal range of motion. Cervical back: Normal range of motion and neck supple. No rigidity. No muscular tenderness. Right lower leg: No tenderness. No edema. Left lower leg: No tenderness. No edema. Skin:     General: Skin is warm and dry. Capillary Refill: Capillary refill takes less than 2 seconds. Neurological:      General: No focal deficit present. Mental Status: He is alert and oriented to person, place, and time. Psychiatric:         Mood and Affect: Mood normal.         Behavior: Behavior normal.        MDM  Number of Diagnoses or Management Options  Acute pain of left shoulder  NSTEMI (non-ST elevated myocardial infarction) (HCC)  Pleural effusion  Diagnosis management comments: 20-year-old male presents as above with shoulder pain. He has an elevated troponin along with a right-sided pleural effusion and evidence of likely malignancy. Plan to admit to the hospital for further management.        Amount and/or Complexity of Data Reviewed  Clinical lab tests: reviewed  Tests in the radiology section of CPT®: reviewed  Tests in the medicine section of CPT®: reviewed  Decide to obtain previous medical records or to obtain history from someone other than the patient: yes      ED Course as of 08/27/22 2139   Sat Aug 27, 2022   1821   ED EKG interpretation:  Rhythm: Sinus tachycardia at a rate of approximately 108. Axis: normal.  ST segment:  No concerning ST elevations or depressions. This EKG was interpreted by Phoebe Garcia MD,ED Provider. [JM]      ED Course User Index  [JM] Verónica Lanza MD       Procedures          Perfect Serve Consult for Admission  9:37 PM    ED Room Number: ER10/10  Patient Name and age:  Marisol Hernández 58 y.o.  male  Working Diagnosis:   1. NSTEMI (non-ST elevated myocardial infarction) ([facility] [address]    2. Pleural effusion    3.  Acute pain of left shoulder        COVID-19 Suspicion:  no  Sepsis present:  no  Reassessment needed: N/A  Code Status:  Full Code  Readmission: no  Isolation Requirements:  no  Recommended Level of Care:  telemetry  Department:[facility] Adult ED - 21   Other: Suspected malignancy on CT

## 2022-08-27 NOTE — ED TRIAGE NOTES
Pt ambulatory to ED via [facility] with c/o L shoulder and L leg pain and weakness onset 3 weeks ago. \"I was carrying groceries up steps. Since then, it hurts to move my left arm and I can't lift it\". Pt reports surgery to left leg in 4/22. \"It never got better after surgery\".

## 2022-08-28 NOTE — CONSULTS
Pulmonary, Critical Care, and Sleep Medicine~Consult Note    Name: Evaristo Casanova MRN: [id number]   : 1960 Hospital: [facility]   Date: 2022 11:42 AM Admission: 2022     Impression Plan   Right lung mass with hilar mass/lymphadenopathy. Right pleural effusion with pleural masses. Nicotine dependence. NSTEMI Ct guided Pleural mass bx or RUL anterior sub pleural bx. Drain right side pleural effusion and send saaple for cytology. If bx negative, will consider EBUS for right hilar mass. D/w Dr Khushboo Anguiano. Thank you for the consult. Pulmonary Consultation:    58year old female with past medical hx as given below who presented to [facility] with increased right shoulder pain. He has unintentional weight loss of 20 pounds in last 2 months. He has cough with thick sputum, he denies fever, chills, night sweats. He noted some occasional right sided chest pain and a lot of fatigue. Known smoker 1.5 ppd x > 20 years now smoking 1/2 ppd. No fhx of malignancy. CT Chest reviewed - Right side pleural effusion. Right anterior sub pleural mass. Right perihilar mass/ LN and pleural nodularity on the right with masses. Small nodules on the left side. I have reviewed the labs and previous days notes. A comprehensive review of systems was negative except for that written in the HPI. Past Medical History:   Diagnosis Date    Arthritis     GERD (gastroesophageal reflux disease)       Past Surgical History:   Procedure Laterality Date    HX APPENDECTOMY      AS CHILD    HX ENDOSCOPY          HX VASCULAR STENT      X3 STENTS GROIN    HX WISDOM TEETH EXTRACTION      ALL TEETH REMOVED      Prior to Admission medications    Medication Sig Start Date End Date Taking? Authorizing Provider   aspirin delayed-release 81 mg tablet Take 81 mg by mouth every morning.     Provider, Historical     No Known Allergies   Social History     Tobacco Use    Smoking status: Every Day Packs/day: 0.50     Years: 40.00     Pack years: 20.00     Types: Cigarettes    Smokeless tobacco: Never   Substance Use Topics    Alcohol use: Yes     Alcohol/week: 2.0 standard drinks     Types: 2 Cans of beer per week     Comment: DAILY      Family History   Problem Relation Age of Onset    Heart Disease Mother         HEART ATTACK     Emphysema Father     No Known Problems Sister     COPD Brother     Lung Disease Brother     Other Brother         MOTOR VEHICLE ACCIDENT    No Known Problems Daughter     No Known Problems Daughter     No Known Problems Daughter     No Known Problems Son     Anesth Problems Neg Hx      OBJECTIVE:     Vital Signs:     Visit Vitals  /61 (BP 1 Location: Right upper arm, BP Patient Position: At rest)   Pulse 90   Temp 98.1 °F (36.7 °C)   Resp 20   Ht 5' 8\" (1.727 m)   Wt 46.4 kg (102 lb 4.7 oz)   SpO2 92%   BMI 15.55 kg/m²      Temp (24hrs), Av.7 °F (37.1 °C), Min:98.1 °F (36.7 °C), Max:99.1 °F (37.3 °C)     Intake/Output:     Last shift: No intake/output data recorded. Last 3 shifts: No intake/output data recorded.         Intake/Output Summary (Last 24 hours) at 2022 1142  Last data filed at 2022 0600  Gross per 24 hour   Intake 0 ml   Output 0 ml   Net 0 ml       Physical Exam:                                        Exam Findings Other   General: No resp distress noted, appears stated age    [de-identified]:  No ulcers, JVD not elevated, no cervical LAD    Chest: No pectus deformity, normal chest rise b/l    HEART:  RRR, no murmurs/rubs/gallops    Lungs:  CTA b/l, no rhonchi/crackles/wheeze, diminished BS at bases    ABD: Soft/NT, non rigid mildly distended    EXT: No cyanosis/clubbing/edema, normal peripheral pulses    Skin: No rashes or ulcers, no mottling    Neuro: A/O x 3        Medications:  Current Facility-Administered Medications   Medication Dose Route Frequency    aspirin delayed-release tablet 81 mg  81 mg Oral 7am    sodium chloride (NS) flush 5-40 mL  5-40 mL IntraVENous Q8H    sodium chloride (NS) flush 5-40 mL  5-40 mL IntraVENous PRN    acetaminophen (TYLENOL) tablet 650 mg  650 mg Oral Q6H PRN    Or    acetaminophen (TYLENOL) suppository 650 mg  650 mg Rectal Q6H PRN    polyethylene glycol (MIRALAX) packet 17 g  17 g Oral DAILY PRN    ondansetron (ZOFRAN ODT) tablet 4 mg  4 mg Oral Q8H PRN    Or    ondansetron (ZOFRAN) injection 4 mg  4 mg IntraVENous Q6H PRN    L.acidophilus-paracasei-S.thermophil-bifidobacter (RISAQUAD) 8 billion cell capsule  1 Capsule Oral DAILY    metoprolol tartrate (LOPRESSOR) tablet 25 mg  25 mg Oral Q12H    lactated Ringers infusion  100 mL/hr IntraVENous CONTINUOUS    nitroglycerin (NITROSTAT) tablet 0.4 mg  0.4 mg SubLINGual Q5MIN PRN    morphine injection 2 mg  2 mg IntraVENous Q4H PRN    cefTRIAXone (ROCEPHIN) 1 g in 0.9% sodium chloride 10 mL IV syringe  1 g IntraVENous Q24H    doxycycline (VIBRAMYCIN) 100 mg in 0.9% sodium chloride (MBP/ADV) 100 mL MBP  100 mg IntraVENous Q12H       Labs:  ABG No results for input(s): PHI, PCO2I, PO2I, HCO3I, SO2I, FIO2I in the last 72 hours.      CBC Recent Labs     08/28/22  0525 08/27/22  1639   WBC 14.4* 15.8*   HGB 9.8* 10.7*   HCT 29.4* 31.1*   * 406*   MCV 97.0 94.0   MCH 32.3 00.3        Metabolic  Panel Recent Labs     08/28/22  0523 08/27/22  1639   NA  --  133*   K  --  4.0   CL  --  100   CO2  --  26   GLU  --  99   BUN  --  14   CREA  --  0.59*   CA  --  9.2   MG 1.9  --    ALB  --  2.7*   ALT  --  18        Pertinent Lary Romero MD  8/28/2022

## 2022-08-28 NOTE — ED NOTES
TRANSFER - OUT REPORT:    Verbal report given to [facility]) on Edel Yi  being transferred to [facility](unit) for routine progression of care       Report consisted of patients Situation, Background, Assessment and   Recommendations(SBAR). Information from the following report(s) SBAR, Kardex, and ED Summary was reviewed with the receiving nurse. Lines:   Peripheral IV 08/27/22 Right Antecubital (Active)   Site Assessment Clean, dry, & intact 08/27/22 1644   Phlebitis Assessment 0 08/27/22 1644   Infiltration Assessment 0 08/27/22 1644   Dressing Status Clean, dry, & intact 08/27/22 1644   Dressing Type Transparent 08/27/22 1644   Hub Color/Line Status Pink;Patent; Flushed 08/27/22 1644   Action Taken Blood drawn 08/27/22 1644   Alcohol Cap Used Yes 08/27/22 1644       Peripheral IV 07/16/20 Left Basilic (Active)        Opportunity for questions and clarification was provided.       Patient transported with:   Monitor  O2 @ 4 liters  Registered Nurse

## 2022-08-28 NOTE — PROGRESS NOTES
TRANSFER - IN REPORT:    Verbal report received from KEELY Contreras(name) on [facility]  being received from ED(unit) for routine progression of care      Report consisted of patients Situation, Background, Assessment and   Recommendations(SBAR). Information from the following report(s) SBAR, Kardex, ED Summary, Intake/Output, MAR, and Recent Results was reviewed with the receiving nurse. Opportunity for questions and clarification was provided. Assessment completed upon patients arrival to unit and care assumed. 0730: Bedside shift change report given to Shivani Ivan RN (oncoming nurse) by Eli Worley RN (offgoing nurse). Report included the following information SBAR, Kardex, Intake/Output, MAR, and Recent Results.

## 2022-08-28 NOTE — CONSULTS
[id number] [facility] Oncology at [facility]  [phone]    Hematology / Oncology Consult    Reason for Visit:   Woo English is a 58 y.o. male who is seen in consultation at the request of Dr. Calvin Valerio for evaluation of metastatic lung cancer. History of Present Illness:   Woo English is a 58 y.o. male with PAD who presented with left shoulder and chest pain, found to have lung mass. Pt states he has had left shoulder pain and chest pain for approximately 2-3 weeks. He reports losing 15 lbs in the past month, but states he has always been thin - reports baseline weight 115-120. He underwent femoral/peroneal bypass in April 2022. States he has been walking with a walker and thinks this caused his shoulder pain. Smokes < 1ppd for past 45 years. Lives with his brother who is on hospice for COPD. Past Medical History:   Diagnosis Date    Arthritis     GERD (gastroesophageal reflux disease)       Past Surgical History:   Procedure Laterality Date    HX APPENDECTOMY      AS CHILD    HX ENDOSCOPY      1990's    HX VASCULAR STENT      X3 STENTS GROIN    HX WISDOM TEETH EXTRACTION      ALL TEETH REMOVED      Social History     Tobacco Use    Smoking status: Every Day     Packs/day: 0.50     Years: 40.00     Pack years: 20.00     Types: Cigarettes    Smokeless tobacco: Never   Substance Use Topics    Alcohol use:  Yes     Alcohol/week: 2.0 standard drinks     Types: 2 Cans of beer per week     Comment: DAILY      Family History   Problem Relation Age of Onset    Heart Disease Mother         HEART ATTACK     Emphysema Father     No Known Problems Sister     COPD Brother     Lung Disease Brother     Other Brother         MOTOR VEHICLE ACCIDENT    No Known Problems Daughter     No Known Problems Daughter     No Known Problems Daughter     No Known Problems Son     Anesth Problems Neg Hx      Current Facility-Administered Medications   Medication Dose Route Frequency    aspirin delayed-release tablet 81 mg  81 mg Oral 7am    sodium chloride (NS) flush 5-40 mL  5-40 mL IntraVENous Q8H    sodium chloride (NS) flush 5-40 mL  5-40 mL IntraVENous PRN    acetaminophen (TYLENOL) tablet 650 mg  650 mg Oral Q6H PRN    Or    acetaminophen (TYLENOL) suppository 650 mg  650 mg Rectal Q6H PRN    polyethylene glycol (MIRALAX) packet 17 g  17 g Oral DAILY PRN    ondansetron (ZOFRAN ODT) tablet 4 mg  4 mg Oral Q8H PRN    Or    ondansetron (ZOFRAN) injection 4 mg  4 mg IntraVENous Q6H PRN    L.acidophilus-paracasei-S.thermophil-bifidobacter (RISAQUAD) 8 billion cell capsule  1 Capsule Oral DAILY    metoprolol tartrate (LOPRESSOR) tablet 25 mg  25 mg Oral Q12H    lactated Ringers infusion  100 mL/hr IntraVENous CONTINUOUS    nitroglycerin (NITROSTAT) tablet 0.4 mg  0.4 mg SubLINGual Q5MIN PRN    morphine injection 2 mg  2 mg IntraVENous Q4H PRN    cefTRIAXone (ROCEPHIN) 1 g in 0.9% sodium chloride 10 mL IV syringe  1 g IntraVENous Q24H    doxycycline (VIBRAMYCIN) 100 mg in 0.9% sodium chloride (MBP/ADV) 100 mL MBP  100 mg IntraVENous Q12H      No Known Allergies     Review of Systems: A complete review of systems was obtained, negative except as described above. Physical Exam:   Visit Vitals  /61 (BP 1 Location: Right upper arm, BP Patient Position: At rest)   Pulse 90   Temp 98.1 °F (36.7 °C)   Resp 20   Ht 5' 8\" (1.727 m)   Wt 102 lb 4.7 oz (46.4 kg)   SpO2 92%   BMI 15.55 kg/m²     ECOG PS: 2-3  General: No distress, cachectic  Eyes: PERRLA, anicteric sclerae  HENT: Atraumatic with normal appearance of ears and nose; OP clear  Neck: Supple; no thyromegaly, JVD  Lymphatic: No cervical,  axillary or inguinal adenopathy. Left supraclavicular LAD noted  Respiratory: Normal respiratory effort. Decreased BS at R base  CV: Normal rate, regular rhythm, no murmurs, no peripheral edema  GI: Soft, nontender, nondistended, no masses, no hepatomegaly, no splenomegaly  MS: Digits without clubbing or cyanosis.  Has muscle wasting  Skin: No rashes, ecchymoses, or petechiae. Normal temperature, turgor, and texture. Neuro/Psych: Moves all 4 extremities. Alert, oriented, appropriate affect, normal judgment/insight      Results:     Lab Results   Component Value Date/Time    WBC 14.4 (H) 2022 05:25 AM    HGB 9.8 (L) 2022 05:25 AM    HCT 29.4 (L) 2022 05:25 AM    PLATELET 567 (H) 91/10/7816 05:25 AM    MCV 97.0 2022 05:25 AM    ABS. NEUTROPHILS 10.6 (H) 2022 05:25 AM     Lab Results   Component Value Date/Time    Sodium 133 (L) 2022 04:39 PM    Potassium 4.0 2022 04:39 PM    Chloride 100 2022 04:39 PM    CO2 26 2022 04:39 PM    Glucose 99 2022 04:39 PM    BUN 14 2022 04:39 PM    Creatinine 0.59 (L) 2022 04:39 PM    GFR est AA >60 2022 04:39 PM    GFR est non-AA >60 2022 04:39 PM    Calcium 9.2 2022 04:39 PM     Lab Results   Component Value Date/Time    Bilirubin, total 0.4 2022 04:39 PM    ALT (SGPT) 18 2022 04:39 PM    Alk. phosphatase 128 (H) 2022 04:39 PM    Protein, total 7.7 2022 04:39 PM    Albumin 2.7 (L) 2022 04:39 PM    Globulin 5.0 (H) 2022 04:39 PM     Lab Results   Component Value Date/Time    Iron 34 (L) 2022 05:23 AM    TIBC 202 (L) 2022 05:23 AM    Iron % saturation 17 (L) 2022 05:23 AM    Ferritin 1,008 (H) 2022 05:29 AM     Lab Results   Component Value Date/Time    Vitamin B12 1,525 (H) 2022 05:23 AM    Folate 11.1 2022 05:23 AM     Lab Results   Component Value Date/Time    TSH 1.59 2022 05:23 AM       Imagin/27/22 CTA chest, abd/pelvis:  FINDINGS:  THORAX: There is a right perihilar lung mass measuring 4.9 x 2.6 cm, with  numerous nodules along delayed right pleural surface, enlarged mediastinal lymph  nodes, and a moderate right pleural effusion. There is left supraclavicular  prepectoral lymphadenopathy.  Multiple bilateral parenchymal lung nodules  consistent with metastatic disease. Significant atelectasis in the right lower  lobe. Heart size is normal. No pericardial effusion. No pneumothorax. LIVER: Subcapsular lesions on the surface of the right lobe of the liver  consistent with metastatic disease. Hypodense lesion in segment 4 (3:139),  consistent with intrahepatic metastasis. GALLBLADDER: No calcified gallstone  SPLEEN: Unremarkable  PANCREAS: No mass or ductal dilatation. ADRENALS: Bilateral adrenal gland lesions consistent with metastatic disease. KIDNEYS/URETERS: Symmetric renal enhancement. No solid renal lesion. PERITONEUM: No ascites. Soft tissue nodules in the right paracolic gutter (6:31)  and anterior to the right kidney (3:71), consistent with metastatic disease. Soft tissue nodule adjacent to the left psoas muscle (3:175), also consistent  with metastatic disease. STOMACH: Unremarkable. SMALL BOWEL: No dilatation or wall thickening. COLON: No dilatation or wall thickening. APPENDIX: Nonvisualized  PELVIS: Compression of the urinary bladder by the expansile left hemipelvic  lesion with associated soft tissue. Prostate gland is normal. No free fluid in  the pelvis. BONES: Expansile destructive lesion of the left scapula, glenoid, left  hemipelvis in addition to multiple lytic lesions of the pelvis, sacrum (S1), and  superior aspect of the L1 vertebral body. Small lytic lesions are noted at the  superior aspect of the T8 vertebral body. Pathologic fracture of the spinous  process of T1 with associated lytic lesion. VESSELS: Normal caliber thoracic aorta, aortic arch vessels, abdominal aorta,  and iliac vessels without aneurysm. Atherosclerotic plaque of the iliofemoral  vasculature. Pulmonary arteries normal in caliber. No filling defects. IMPRESSION  No evidence of aortic dissection or pulmonary embolism.   Metastatic malignancy likely originating from the right lung, with extensive  evidence of metastatic disease including diffuse bone metastases, diffuse right  pleural disease and bilateral lung parenchymal disease, peritoneal nodules,  adrenal metastases, and liver lesions. Oncologic consultation recommended. Assessment and Recommendations:   Marisol Hernández is a 58 y.o. male with PAD admitted with left shoulder pain related to likely metastatic lung cancer. 1. Right lung mass / R pleural effusion:  CT imaging is concerning for a metastatic lung cancer with a R perihilar lung mass, mediastinal lymphadenopathy, R pleural effusion, adrenal and bone metastases. Difficult situation with likely incurable lung cancer, but pt with CAD/PAD and possible NSTEMI on Aspirin. Given hyponatremia and diffuse disease, I am concerned that this could represent small cell lung cancer which can progress quickly. He is quite cachectic and I am worried he would not tolerate chemotherapy well. If patient would like to proceed with biopsy, I recommend CT-guided biopsy of lung mass as soon as possible, but I recommend discussing with Cardiology regarding safety of holding Aspirin. I presented option of hospice as well, and pt would like to think this over. I also raised the possibility of palliative RT to left shoulder pain if pain is not controlled with medication. -- Consulting Palliative care for assistance with addressing goals of care. -- If patient wants to proceed with diagnostic testing, would order CT-guided biopsy of lung mass when feasible. Decision needs to be made about risks/benefits fo holding Aspirin for the biopsy. 2. Bone metastases:  2/2 underlying malignancy. 3. Peripheral artery disease / CAD / NSTEMI:  S/p femoral/peroneal bypass. On ASA. 4. Hyponatremia:  May be SIADH 2/2 lung cancer. 5. Normocytic anemia:   Likely anemia of chronic disease. No iron/B12/folate deficiency noted. Goals of care:  Pt likely has an incurable cancer.  While the disease is treatable to improve quality and duration of life, treatment does cause significant side effects/toxicity which pt might not be able to tolerate. Pt is thinking about his goals/wishes.      Signed By: Lino Landon MD     August 28, 2022

## 2022-08-28 NOTE — PROGRESS NOTES
Bedside shift change report given to [facility] (oncoming nurse) by Pham Villareal (offgoing nurse). Report included the following information SBAR, Kardex, Intake/Output, MAR, Recent Results, and Cardiac Rhythm NSR .

## 2022-08-28 NOTE — PROGRESS NOTES
[facility] [facility]  Hospitalist Group                                                                                          Hospitalist Progress Note  Debbie Bernal MD  Answering service: [phone] OR [phone] from in house phone        Date of Service:  2022  NAME:  Zarina Orellana  :  1960  MRN:  [id number]      Admission Summary: This 80-year-old man with past medical history significant for peripheral artery disease, status post left femoral/peroneal bypass presented at the emergency room with left shoulder pain. This started about 3 weeks ago. The patient also complained of left-sided chest pain. It is not clear whether the left shoulder pain is as a result of radiation from the left-sided chest pain. The pain is constant sharp pain, 9/10 in severity, worse with breathing. No known relieving factors. The patient also complained of left leg pain. He stated that he has had left leg pain since after his surgery which was in April. The patient also stated that he has been losing weight which was unintentional.  Overall, the patient stated that he feels weak and it is becoming difficult to carry out activity of daily living. Interval history / Subjective:   Discussed findings on Ct scans with the patient, and concern for malignancy. Reviewed that although to be certain we would need a biopsy, radiographically he appears to have metastatic lung cancer. Discussed with cardiology, given low level troponin, flat and findings on CT scan will DC heparin.      Assessment & Plan:     R lung mass with hilar LAD, and widely metastatic bony disease  - IR guided biopsy for tomorrow  - Oncology and pulmonary consulted   - pain control   - DC IV abx, procalcitonin neg, afebrile and no signs of symptoms of infection     L Shoulder pain - secondary to bony metastasis likely (in glenoid, scapula)  - IV morphine  - Add PO oxycodone  - Will benefit from palliative care Elevated troponin - flat. Given presence of malignancy and location suspect CP and shoulder pain related to above. - Discussed with Dr. Bayron Perez. DC heparin gtt  - Check Echo    PVD - s/p L fem-perineal bypass   - Contiue ASA    Hyponatremia - mild, monitor   Tobacco abuse - current smoker. Declined nicotine patch     Code status: FULL  Prophylaxis: add lovenox   Care Plan discussed with: pt, RN   Anticipated Disposition: 24-48 hours. After biopsy likely can DC home with o/p follow up      Hospital Problems  Date Reviewed: 8/28/2022            Codes Class Noted POA    * (Principal) NSTEMI (non-ST elevated myocardial infarction) [facility]) ICD-10-CM: I21.4  ICD-9-CM: 410.70  8/27/2022 Yes             Review of Systems:   A comprehensive review of systems was negative except for that written in the HPI. Vital Signs:    Last 24hrs VS reviewed since prior progress note. Most recent are:  Visit Vitals  BP 90/70 (BP 1 Location: Right upper arm)   Pulse 87   Temp 98.1 °F (36.7 °C)   Resp 18   Ht 5' 8\" (1.727 m)   Wt 46.4 kg (102 lb 4.7 oz)   SpO2 93%   BMI 15.55 kg/m²         Intake/Output Summary (Last 24 hours) at 8/28/2022 1436  Last data filed at 8/28/2022 1200  Gross per 24 hour   Intake 1120 ml   Output 200 ml   Net 920 ml        Physical Examination:     I had a face to face encounter with this patient and independently examined them on 8/28/2022 as outlined below:          Constitutional:  No acute distress, cooperative, pleasant, chronically ill appearing, cachectic    ENT:  Oral mucosa moist, oropharynx benign. Resp:  CTA bilaterally. No wheezing/rhonchi/rales. No accessory muscle use. CV:  Regular rhythm, normal rate, no murmurs, gallops, rubs    GI:  Soft, non distended, non tender. normoactive bowel sounds, no hepatosplenomegaly     Musculoskeletal:  No edema, warm, 2+ pulses throughout    Neurologic:  Moves all extremities.   AAOx3, CN II-XII reviewed            Data Review:    Review and/or order of clinical lab test  Review and/or order of tests in the radiology section of CPT  Review and/or order of tests in the medicine section of CPT      Labs:     Recent Labs     08/28/22  0525 08/27/22  1639   WBC 14.4* 15.8*   HGB 9.8* 10.7*   HCT 29.4* 31.1*   * 406*     Recent Labs     08/28/22 0523 08/27/22  1639   NA  --  133*   K  --  4.0   CL  --  100   CO2  --  26   BUN  --  14   CREA  --  0.59*   GLU  --  99   CA  --  9.2   MG 1.9  --      Recent Labs     08/27/22  1639   ALT 18   *   TBILI 0.4   TP 7.7   ALB 2.7*   GLOB 5.0*     Recent Labs     08/28/22 0523 08/27/22  2345   APTT 35.4* 35.3*      Recent Labs     08/28/22 0529 08/28/22 0523   TIBC  --  202*   PSAT  --  17*   FERR 1,008*  --       Lab Results   Component Value Date/Time    Folate 11.1 08/28/2022 05:23 AM      No results for input(s): PH, PCO2, PO2 in the last 72 hours. No results for input(s): CPK, CKNDX, TROIQ in the last 72 hours.     No lab exists for component: CPKMB  No results found for: CHOL, CHOLX, CHLST, CHOLV, HDL, HDLP, LDL, LDLC, DLDLP, TGLX, TRIGL, TRIGP, CHHD, CHHDX  No results found for: GLUCPOC  No results found for: COLOR, APPRN, SPGRU, REFSG, IRINA, PROTU, GLUCU, KETU, BILU, UROU, ROLANDO, LEUKU, GLUKE, EPSU, BACTU, WBCU, RBCU, CASTS, UCRY      Medications Reviewed:     Current Facility-Administered Medications   Medication Dose Route Frequency    aspirin delayed-release tablet 81 mg  81 mg Oral 7am    sodium chloride (NS) flush 5-40 mL  5-40 mL IntraVENous Q8H    sodium chloride (NS) flush 5-40 mL  5-40 mL IntraVENous PRN    acetaminophen (TYLENOL) tablet 650 mg  650 mg Oral Q6H PRN    Or    acetaminophen (TYLENOL) suppository 650 mg  650 mg Rectal Q6H PRN    polyethylene glycol (MIRALAX) packet 17 g  17 g Oral DAILY PRN    ondansetron (ZOFRAN ODT) tablet 4 mg  4 mg Oral Q8H PRN    Or    ondansetron (ZOFRAN) injection 4 mg  4 mg IntraVENous Q6H PRN    L.acidophilus-paracasei-S.thermophil-bifidobacter (RISAQUAD) 8 billion cell capsule  1 Capsule Oral DAILY    metoprolol tartrate (LOPRESSOR) tablet 25 mg  25 mg Oral Q12H    lactated Ringers infusion  100 mL/hr IntraVENous CONTINUOUS    nitroglycerin (NITROSTAT) tablet 0.4 mg  0.4 mg SubLINGual Q5MIN PRN    morphine injection 2 mg  2 mg IntraVENous Q4H PRN    cefTRIAXone (ROCEPHIN) 1 g in 0.9% sodium chloride 10 mL IV syringe  1 g IntraVENous Q24H    doxycycline (VIBRAMYCIN) 100 mg in 0.9% sodium chloride (MBP/ADV) 100 mL MBP  100 mg IntraVENous Q12H     ______________________________________________________________________  EXPECTED LENGTH OF STAY: - - -  ACTUAL LENGTH OF STAY:          1                 Roberto Finnegan MD

## 2022-08-28 NOTE — PROGRESS NOTES
Lovenox Monitoring  Indication: DVT Prophylaxis  Recent Labs     08/28/22  0525 08/27/22  1639   HGB 9.8* 10.7*   * 406*   CREA  --  0.59*     Current Weight: 46.4 kg  Est. CrCl = 71.8 ml/min  Current Dose: 40 mg subcutaneously every 24 hours. Plan: Change to 30 mg q24h due to weight <50.9 kg.

## 2022-08-28 NOTE — PROGRESS NOTES
Gave 500 ml bolus for low BP. Per MD ok to give Tylenol for now. Patient states he has been taking 4-5 Tylenol at home every four hours. Will try then ok to give Oxy per MD if does not help. BP 97/61.

## 2022-08-28 NOTE — H&P
[facility]  HISTORY AND PHYSICAL    Name:  Sohan Fox  MR#:  [id number]  :  1960  ACCOUNT #:  [de-identified]  ADMIT DATE:  2022      The patient was seen, evaluated, and admitted by me on 2022. PRIMARY CARE PHYSICIAN:  Unknown. SOURCE OF INFORMATION:  The patient and review of ED and old electronic medical records. CHIEF COMPLAINT:  Left shoulder pain. HISTORY OF PRESENT ILLNESS:  This 60-year-old man with past medical history significant for peripheral artery disease, status post left femoral/peroneal bypass presented at the emergency room with left shoulder pain. This started about 3 weeks ago. The patient also complained of left-sided chest pain. It is not clear whether the left shoulder pain is as a result of radiation from the left-sided chest pain. The pain is constant sharp pain, 9/10 in severity, worse with breathing. No known relieving factors. The patient also complained of left leg pain. He stated that he has had left leg pain since after his surgery which was in April. The patient also stated that he has been losing weight which was unintentional.  Overall, the patient stated that he feels weak and it is becoming difficult to carry out activity of daily living. Denies fever, rigors, and chills. The patient came to the emergency room for further evaluation. When the patient arrived at the emergency room, the patient was found to have elevated troponin level. The CT of the chest, abdomen, and pelvis was obtained and this shows evidence of metastatic disease most likely lung cancer. The patient was subsequently referred to the hospitalist service for evaluation for admission. He was last admitted to the hospital from 2022 to 2022. The patient was admitted to the surgical service and underwent left femoral-peroneal bypass secondary to peripheral artery disease. ALLERGIES:  NO KNOWN DRUG ALLERGIES.     MEDICATIONS:  Aspirin 81 mg daily. FAMILY HISTORY:  This was reviewed. His father had emphysema. His mother had heart disease. PAST SURGICAL HISTORY:  This is significant for appendectomy and left femoral-peroneal bypass. SOCIAL HISTORY:  The patient smokes less than a pack of cigarettes daily. Denies alcohol abuse. REVIEW OF SYSTEMS:  HEAD, EYES, EARS, NOSE, AND THROAT:  No headache, no dizziness, no blurring of vision, and no photophobia. RESPIRATORY SYSTEM:  This is positive for shortness of breath. No cough and no hemoptysis. CARDIOVASCULAR SYSTEM:  No chest pain, no orthopnea, and no palpitation. GASTROINTESTINAL SYSTEM:  No nausea or vomiting, no diarrhea, and no constipation. GENITOURINARY SYSTEM:  No dysuria, no urgency, and no frequency. All other systems are reviewed and they are negative. PHYSICAL EXAMINATION:  GENERAL APPEARANCE:  The patient appeared ill and in moderate distress. VITAL SIGNS:  On arrival at the emergency room; temperature 99.1, pulse 112, respiratory rate 18, blood pressure 120/79, and oxygen saturation 90%. HEAD:  Normocephalic, atraumatic. EYES:  Normal eye movement. No redness, no drainage, and no discharge. EARS:  Normal external ears with no obvious drainage. NOSE:  No deformity and no drainage. MOUTH AND THROAT:  No visible oral lesion. NECK:  Neck is supple. No JVD and no thyromegaly. CHEST:  Reduced breath sounds, right lung field. Clear breath sounds, left lung field. HEART:  Normal S1 and S2, regular. No clinically appreciable murmur. ABDOMEN:  Soft and nontender. Normal bowel sounds. CNS:  Alert and oriented x3. No gross focal neurological deficit. EXTREMITIES:  No edema. Pulses 2+ bilaterally. MUSCULOSKELETAL SYSTEM:  No obvious joint deformity and swelling. SKIN:  No active skin lesions seen in the exposed part of the body. PSYCHIATRY:  Normal mood and affect. LYMPHATIC SYSTEM:  No cervical lymphadenopathy.     DIAGNOSTIC DATA:  The EKG shows sinus tachycardia and nonspecific ST and T-waves abnormalities concerning for ischemia. Chest x-ray shows interval development of moderate right pleural effusion, interval development of small left pleural effusion, newly diagnosed interstitial lung disease which may represent atypical/viral pneumonia. The CTA of the chest, abdomen, and pelvis, no evidence of aortic dissection or pulmonary embolism. Metastatic malignancy, likely originating from the right lung with extensive evidence of metastatic disease including diffuse bone metastasis, diffuse right pleural disease, and bilateral lung parenchymal disease, peritoneal nodules, adrenal metastases, and liver lesions. LABORATORY DATA:  Hematology; WBC 15.8, hemoglobin 10.7, hematocrit 31.1, and platelets 214. Chemistry; sodium 133, potassium 4.0, chloride 100, CO2 of 26, glucose 99, BUN 14, creatinine 0.59, calcium 9.2, total bilirubin 0.4, ALT 18, AST 34, alkaline phosphatase 128, total protein 7.7, albumin level 2.7, and globulin 5.0. Troponin high sensitivity is 123. ASSESSMENT:  1. Suspected non-ST elevation myocardial infarction. 2.  Metastatic lung cancer. 3.  Tobacco abuse. 4.  Peripheral artery disease, status post left femoral-peroneal bypass. 5.  Bilateral pleural effusion, right greater than left. 6.  Hyponatremia. 7.  Anemia. 8.  Suspected bacterial pneumonia. 9.  Thrombocytosis. 10.  Cachexia. PLAN:  1. Suspected non-ST elevation myocardial infarction. We will admit the patient for further evaluation and treatment. We will start the patient on full-dose heparin. We will continue aspirin and beta blocker if tolerated by the patient's blood pressure. We will obtain echocardiogram.  We will trend troponin level. Cardiology consult will be requested to assist in further evaluation and treatment.   We will continue treatment for suspected non-ST elevation myocardial infarction until when the patient is seen by cardiologist to determine whether the patient has non-ST elevation myocardial infarction or not. 2.  Metastatic lung cancer. This is based on the CT scan of the chest result. Oncology consult will be requested. Pulmonary consult will also be requested to assist in further evaluation and treatment. 3.  Tobacco abuse. The patient advised to quit smoking. He does not want to be placed on Nicoderm patch at this time. 4.  Peripheral artery disease, status post left femoral-peroneal bypass. We will continue with aspirin therapy. 5.  Bilateral pleural effusion, right greater than left. This may be malignant pleural effusion. Pulmonary consult will be requested to assist in further evaluation and treatment. 6.  Hyponatremia. This may be due to volume depletion, but in the setting of suspected lung cancer, there is a concern for SIADH. We will carry out fluid therapy and monitor the patient's sodium level. 7.  Anemia. This is most likely due to chronic disease. We will carry out anemia workup including checking stool guaiac to rule out occult GI bleed. 8.  Suspected bacterial pneumonia. We will start the patient on Rocephin and doxycycline. We will check the patient for COVID-19 virus infection. 9.  Thrombocytosis. This is most likely reactive thrombocytosis. We will monitor the patient's platelet count. 10.  Cachexia. This is most likely due to the suspected malignancy. We will check a TSH level. 11.  Other issues. Code status, the patient is a full code. The patient is on full-dose heparin for treatment of suspected non-ST elevation myocardial infarction. Because of that there is no need for DVT prophylaxis. COVID PRECAUTION:  The patient was wearing a face mask. I was wearing a face mask and gloves for this patient's encounter. FUNCTIONAL STATUS PRIOR TO ADMISSION:  The patient came from home. The patient is ambulatory with no assistive device.       MD TRUDI [email or web address]  D: 08/28/2022 3:18  T:  08/28/2022 4:25  JOB #:  [id number]

## 2022-08-29 PROBLEM — E43 SEVERE PROTEIN-CALORIE MALNUTRITION (HCC): Status: ACTIVE | Noted: 2022-01-01

## 2022-08-29 NOTE — PROGRESS NOTES
0730: Bedside shift change report given to Warner Edwards RN (oncoming nurse) by Abhinav Cali RN (offgoing nurse). Report included the following information SBAR, MAR, and Recent Results. 1930: Bedside shift change report given to KEELY Culeln (oncoming nurse) by Warner Edwards RN (offgoing nurse). Report included the following information SBAR, MAR, and Recent Results. Charting and patient care of Hilary Martins by Guy Dalton RN from 0730 to 4015 was supervised and reviewed by this RN.

## 2022-08-29 NOTE — CARDIO/PULMONARY
Cardiac Rehab: Per EMR, patient is a current smoker.  Smoking Cessation Program information placed on the AVS.    Ankit Waterman RN

## 2022-08-29 NOTE — PROGRESS NOTES
Comprehensive Nutrition Assessment    Type and Reason for Visit: Initial (Low BMI)    Nutrition Recommendations/Plan:   Continue with Regular diet order  Will order Ensure Enlive BID and Magic Cup BID       Malnutrition Assessment:  Malnutrition Status:  Severe malnutrition (08/29/22 1445)    Context:  Acute illness     Findings of the 6 clinical characteristics of malnutrition:   Energy Intake:  50% or less of est energy requirements for 5 or more days  Weight Loss:  Greater than 7.5% over 3 months     Body Fat Loss: Moderate body fat loss, Triceps, Orbital, Buccal region, Fat overlying ribs   Muscle Mass Loss: Moderate muscle mass loss, Temples (temporalis), Clavicles (pectoralis & deltoids)  Fluid Accumulation:  No significant fluid accumulation,     Strength:  Not performed        Nutrition Assessment:    57 yo male admitted for NSTEMI. PMHx: PAD. CT of chest showed evidence of metastatic disease, most likely lung CA with mets to bone. Mass biopsy was obtained today, results pending. Spoke with pt at bedside. He had just finished eating lunch. Was off the floor for breakfast so he had a breakfast and lunch tray that he ate from, ate 100% eggs and 25% milk from breakfast tray and < 25% chicken from lunch. No teeth but pt denies difficulty chewing most foods without his dentures (which are at home). He admits to having poor PO intakes for several months secondary to poor appetite. Denies any GI complaints such as n/v/d/c. Discussed ONS, pt agreeable to Ensure Enlive (strawberry) and [employer]. Weight hx in EMR indicates weight loss of 12% over last 4 months. Pt states he lost 9 kg x 2 months but his UBW is 52-54 kg which is about 6-8 kg loss. Regardless of which is correct, pt has had significant weight loss for time frame. Severe muscle and fat wasting present. Labs reviewed.       Nutritionally Significant Medications:  Probiotic; LR at 100 ml/hr      Estimated Daily Nutrient Needs:  Energy Requirements Based On: Kcal/kg  Weight Used for Energy Requirements: Current  Energy (kcal/day): 8706-5903 (35-40 kcals/kg)  Weight Used for Protein Requirements: Current  Protein (g/day): 83 (1.8 gm/kg  (20%))  Method Used for Fluid Requirements: 1 ml/kcal  Fluid (ml/day): 1620    Nutrition Related Findings:   Edema: none  Last BM:  (PTA),      Wounds: None      Current Nutrition Therapies:  Diet: Regular  Supplements: none  Meal intake: Patient Vitals for the past 168 hrs:   % Diet Eaten   08/29/22 0908 26 - 50%     Supplement intake: No data found. Nutrition Support: none      Anthropometric Measures:  Height: 5' 8\" (172.7 cm)  Ideal Body Weight (IBW): 154 lbs (70 kg)     Current Body Wt:  46.2 kg (101 lb 13.6 oz), 66.1 % IBW.  Standing scale  Current BMI (kg/m2): 15.5        Weight Adjustment: No adjustment                 BMI Category: Underweight (BMI less than 18.5)    Wt Readings from Last 10 Encounters:   08/29/22 46.3 kg (102 lb)   04/28/22 52.4 kg (115 lb 8.3 oz)   04/25/22 52.4 kg (115 lb 8.3 oz)   05/12/21 54.4 kg (120 lb)   03/25/21 54.4 kg (120 lb)   03/15/21 54.4 kg (120 lb)           Nutrition Diagnosis:   Severe malnutrition related to inadequate protein-energy intake as evidenced by intake 26-50%, weight loss greater than or equal to 5% in 1 month, severe muscle loss, severe loss of subcutaneous fat  Underweight related to inadequate protein-energy intake as evidenced by BMI (15.5)    Nutrition Interventions:   Food and/or Nutrient Delivery: Continue current diet, Start oral nutrition supplement  Nutrition Education/Counseling: No recommendations at this time  Coordination of Nutrition Care: Continue to monitor while inpatient       Goals:     Goals: other (specify)  Specify Other Goals: PO intakes > 75% meals + ONS to promote weight gain of 0.5-1 lb over next 5-7 days    Nutrition Monitoring and Evaluation:   Behavioral-Environmental Outcomes: None identified  Food/Nutrient Intake Outcomes: Food and nutrient intake, Supplement intake  Physical Signs/Symptoms Outcomes: Biochemical data, GI status, Weight    Discharge Planning:    Continue current diet, Continue oral nutrition supplement    Fab Parker RD  Available via [employer]

## 2022-08-29 NOTE — DISCHARGE INSTRUCTIONS
Smoking Cessation Program: This is a free, phone/text/email based, smoking cessation program. The program is individualized to meet each patient's needs. To enroll use the link - [facility]. [email or web address]/quit or text Amari Liuus to [phone] from any smart phone. Discharge Instructions       PATIENT ID: Gui Fay  MRN: [id number]   YOB: 1960    DATE OF ADMISSION: [unfilled]    DATE OF DISCHARGE: 9/6/2022    PRIMARY CARE PROVIDER: @[email or web address]@     ATTENDING PHYSICIAN: [unfilled]  DISCHARGING PROVIDER: Marisa Nesbitt MD    To contact this individual call [phone] and ask the  to page. If unavailable ask to be transferred the Adult Hospitalist Department. DISCHARGE DIAGNOSES Lung ca with mets and effusion    CONSULTATIONS: [unfilled]    PROCEDURES/SURGERIES: * No surgery found *    PENDING TEST RESULTS:   At the time of discharge the following test results are still pending:     FOLLOW UP APPOINTMENTS:   [unfilled]     ADDITIONAL CARE RECOMMENDATIONS:     DIET: Regular Diet    ACTIVITY: Activity as tolerated    WOUND CARE:     EQUIPMENT needed:       Radiology      DISCHARGE MEDICATIONS:   See Medication Reconciliation Form    It is important that you take the medication exactly as they are prescribed. Keep your medication in the bottles provided by the pharmacist and keep a list of the medication names, dosages, and times to be taken in your wallet. Do not take other medications without consulting your doctor. NOTIFY YOUR PHYSICIAN FOR ANY OF THE FOLLOWING:   Fever over 101 degrees for 24 hours. Chest pain, shortness of breath, fever, chills, nausea, vomiting, diarrhea, change in mentation, falling, weakness, bleeding. Severe pain or pain not relieved by medications. Or, any other signs or symptoms that you may have questions about.       DISPOSITION:    Home With:   OT  PT  HH  RN       SNF/Inpatient Rehab/LTAC    Independent/assisted living   x Hospice    Other:     CDMP Checked:   Yes ***     PROBLEM LIST Updated:  Yes ***       Signed:   Katie Marquez MD  9/6/2022  8:53 AM

## 2022-08-29 NOTE — PROGRESS NOTES
Pulmonary, Critical Care, and Sleep Medicine~Consult Note    Name: Zan Ho MRN: [id number]   : 1960 Hospital: [facility]   Date: 2022 11:42 AM Admission: 2022     Impression Plan   Right lung mass with hilar mass/lymphadenopathy. New since April CXR. Right pleural effusion with pleural masses. Nicotine dependence. NSTEMI  Dyspnea Ct guided Pleural mass bx or RUL anterior sub pleural bx. - planned for this morning  Drain right side pleural effusion and send saaple for cytology. He wants to hold off currently, consider for tomorrow. If bx negative, will consider EBUS for right hilar mass. Add bronchodilators    D/w palliative care MD       Interval History:      Says he feels terrible, has dyspnea and left shoulder pain. No new complaints. Pulmonary Consultation:    58year old female with past medical hx as given below who presented to [facility] with increased right shoulder pain. He has unintentional weight loss of 20 pounds in last 2 months. He has cough with thick sputum, he denies fever, chills, night sweats. He noted some occasional right sided chest pain and a lot of fatigue. Known smoker 1.5 ppd x > 20 years now smoking 1/2 ppd. No fhx of malignancy. CT Chest reviewed - Right side pleural effusion. Right anterior sub pleural mass. Right perihilar mass/ LN and pleural nodularity on the right with masses. Small nodules on the left side. Had CXR in April without any signs of mass. I have reviewed the labs and previous days notes. A comprehensive review of systems was negative except for that written in the HPI.   Past Medical History:   Diagnosis Date    Arthritis     GERD (gastroesophageal reflux disease)       Past Surgical History:   Procedure Laterality Date    HX APPENDECTOMY      AS CHILD    HX ENDOSCOPY          HX VASCULAR STENT      X3 STENTS GROIN    HX WISDOM TEETH EXTRACTION      ALL TEETH REMOVED      Prior to Admission medications    Medication Sig Start Date End Date Taking? Authorizing Provider   aspirin delayed-release 81 mg tablet Take 81 mg by mouth every morning. Provider, Historical     No Known Allergies   Social History     Tobacco Use    Smoking status: Every Day     Packs/day: 0.50     Years: 40.00     Pack years: 20.00     Types: Cigarettes    Smokeless tobacco: Never   Substance Use Topics    Alcohol use: Yes     Alcohol/week: 2.0 standard drinks     Types: 2 Cans of beer per week     Comment: DAILY      Family History   Problem Relation Age of Onset    Heart Disease Mother         HEART ATTACK     Emphysema Father     No Known Problems Sister     COPD Brother     Lung Disease Brother     Other Brother         MOTOR VEHICLE ACCIDENT    No Known Problems Daughter     No Known Problems Daughter     No Known Problems Daughter     No Known Problems Son     Anesth Problems Neg Hx      OBJECTIVE:     Vital Signs:     Visit Vitals  BP 97/73 (BP 1 Location: Right arm, BP Patient Position: At rest)   Pulse 84   Temp 98.4 °F (36.9 °C)   Resp 25   Ht 5' 8\" (1.727 m)   Wt 46.3 kg (102 lb)   SpO2 97%   BMI 15.51 kg/m²      Temp (24hrs), Av.6 °F (37 °C), Min:98.3 °F (36.8 °C), Max:99.1 °F (37.3 °C)     Intake/Output:     Last shift: No intake/output data recorded.     Last 3 shifts:  1901 -  0700  In: 2340 [P.O.:340; I.V.:2000]  Out: 925 [Urine:925]        Intake/Output Summary (Last 24 hours) at 2022 0957  Last data filed at 2022 0300  Gross per 24 hour   Intake 2340 ml   Output 925 ml   Net 1415 ml         Physical Exam:                                        Exam Findings Other   General: No resp distress noted, appears stated age    HEENT:  No ulcers, JVD not elevated, no cervical LAD    Chest: No pectus deformity, normal chest rise b/l    HEART:  RRR, no murmurs/rubs/gallops    LUNGS: Decreased BS on the right, mild wheeze    ABD: Soft/NT, non rigid mildly distended    EXT: No cyanosis/clubbing/edema, normal peripheral pulses    Skin: No rashes or ulcers, no mottling    Neuro: A/O x 3        Medications:  Current Facility-Administered Medications   Medication Dose Route Frequency    fentaNYL citrate (PF) injection  mcg   mcg IntraVENous RAD PRN    midazolam (VERSED) injection 0.5-5 mg  0.5-5 mg IntraVENous Rad Multiple    flumazeniL (ROMAZICON) 0.1 mg/mL injection 0.5 mg  0.5 mg IntraVENous RAD PRN    naloxone (NARCAN) injection 1 mg  1 mg IntraVENous ONCE    0.9% sodium chloride infusion  25 mL/hr IntraVENous RAD CONTINUOUS    aspirin delayed-release tablet 81 mg  81 mg Oral 7am    sodium chloride (NS) flush 5-40 mL  5-40 mL IntraVENous Q8H    sodium chloride (NS) flush 5-40 mL  5-40 mL IntraVENous PRN    acetaminophen (TYLENOL) tablet 650 mg  650 mg Oral Q6H PRN    Or    acetaminophen (TYLENOL) suppository 650 mg  650 mg Rectal Q6H PRN    polyethylene glycol (MIRALAX) packet 17 g  17 g Oral DAILY PRN    ondansetron (ZOFRAN ODT) tablet 4 mg  4 mg Oral Q8H PRN    Or    ondansetron (ZOFRAN) injection 4 mg  4 mg IntraVENous Q6H PRN    L.acidophilus-paracasei-S.thermophil-bifidobacter (RISAQUAD) 8 billion cell capsule  1 Capsule Oral DAILY    metoprolol tartrate (LOPRESSOR) tablet 25 mg  25 mg Oral Q12H    oxyCODONE IR (ROXICODONE) tablet 5 mg  5 mg Oral Q4H PRN    enoxaparin (LOVENOX) injection 30 mg  30 mg SubCUTAneous Q24H    lactated Ringers infusion  100 mL/hr IntraVENous CONTINUOUS    nitroglycerin (NITROSTAT) tablet 0.4 mg  0.4 mg SubLINGual Q5MIN PRN    morphine injection 2 mg  2 mg IntraVENous Q4H PRN       Labs:  ABG No results for input(s): PHI, PCO2I, PO2I, HCO3I, SO2I, FIO2I in the last 72 hours.      CBC Recent Labs     08/29/22  0307 08/28/22  0525 08/27/22  1639   WBC 14.5* 14.4* 15.8*   HGB 9.2* 9.8* 10.7*   HCT 27.1* 29.4* 31.1*   * 408* 406*   MCV 96.4 97.0 94.0   MCH 32.7 32.3 07.1          Metabolic  Panel Recent Labs     08/29/22  0307 08/28/22  0528 08/27/22  1639   *  --  133*   K 4.4  --  4.0   CL 99  --  100   CO2 24  --  26   GLU 95  --  99   BUN 12  --  14   CREA 0.42*  --  0.59*   CA 8.5  --  9.2   MG 1.7 1.9  --    PHOS 3.9  --   --    ALB  --   --  2.7*   ALT  --   --  18          [facility]                Errol Sullivan NP  8/29/2022

## 2022-08-29 NOTE — PROGRESS NOTES
[id number]  Medical Oncology at [facility]      Hematology / Oncology Consult    Reason for Visit:   Siddharth De Dios is a 58 y.o. male who is seen in hospital fu for presumed metastatic lung cancer. History of Present Illness:   Siddharth De Dios is a 58 y.o. male seen today for fu of presumed lung cancer. Biopsy done today and path pending. Pt is in bed on 02. Weak overall. Case mgmt at bedside. .   Pt has met with palliative care. No fevers/ chills/ chest pain/ SOB/ nausea/ vomiting/diarrhea/     Initial note:  with PAD who presented with left shoulder and chest pain, found to have lung mass. Pt states he has had left shoulder pain and chest pain for approximately 2-3 weeks. He reports losing 15 lbs in the past month, but states he has always been thin - reports baseline weight 115-120. He underwent femoral/peroneal bypass in April 2022. States he has been walking with a walker and thinks this caused his shoulder pain. Smokes < 1ppd for past 45 years. Lives with his brother who is on hospice for COPD. Past Medical History:   Diagnosis Date    Arthritis     GERD (gastroesophageal reflux disease)       Past Surgical History:   Procedure Laterality Date    HX APPENDECTOMY      AS CHILD    HX ENDOSCOPY      1990's    HX VASCULAR STENT      X3 STENTS GROIN    HX WISDOM TEETH EXTRACTION      ALL TEETH REMOVED      Social History     Tobacco Use    Smoking status: Every Day     Packs/day: 0.50     Years: 40.00     Pack years: 20.00     Types: Cigarettes    Smokeless tobacco: Never   Substance Use Topics    Alcohol use:  Yes     Alcohol/week: 2.0 standard drinks     Types: 2 Cans of beer per week     Comment: DAILY      Family History   Problem Relation Age of Onset    Heart Disease Mother         HEART ATTACK     Emphysema Father     No Known Problems Sister     COPD Brother     Lung Disease Brother     Other Brother         MOTOR VEHICLE ACCIDENT    No Known Problems Daughter     No Known Problems Daughter     No Known Problems Daughter     No Known Problems Son     Anesth Problems Neg Hx      Current Facility-Administered Medications   Medication Dose Route Frequency    naloxone (NARCAN) injection 1 mg  1 mg IntraVENous ONCE    albuterol-ipratropium (DUO-NEB) 2.5 MG-0.5 MG/3 ML  3 mL Nebulization QID RT    oxyCODONE IR (ROXICODONE) tablet 5 mg  5 mg Oral Q3H PRN    oxyCODONE IR (ROXICODONE) tablet 10 mg  10 mg Oral Q3H PRN    [START ON 8/30/2022] pantoprazole (PROTONIX) tablet 40 mg  40 mg Oral ACB    dexAMETHasone (DECADRON) tablet 4 mg  4 mg Oral DAILY    acetaminophen (TYLENOL) tablet 650 mg  650 mg Oral TID    [START ON 8/30/2022] senna-docusate (PERICOLACE) 8.6-50 mg per tablet 2 Tablet  2 Tablet Oral DAILY    aspirin delayed-release tablet 81 mg  81 mg Oral 7am    sodium chloride (NS) flush 5-40 mL  5-40 mL IntraVENous Q8H    sodium chloride (NS) flush 5-40 mL  5-40 mL IntraVENous PRN    acetaminophen (TYLENOL) tablet 650 mg  650 mg Oral Q6H PRN    Or    acetaminophen (TYLENOL) suppository 650 mg  650 mg Rectal Q6H PRN    polyethylene glycol (MIRALAX) packet 17 g  17 g Oral DAILY PRN    ondansetron (ZOFRAN ODT) tablet 4 mg  4 mg Oral Q8H PRN    Or    ondansetron (ZOFRAN) injection 4 mg  4 mg IntraVENous Q6H PRN    L.acidophilus-paracasei-S.thermophil-bifidobacter (RISAQUAD) 8 billion cell capsule  1 Capsule Oral DAILY    metoprolol tartrate (LOPRESSOR) tablet 25 mg  25 mg Oral Q12H    enoxaparin (LOVENOX) injection 30 mg  30 mg SubCUTAneous Q24H    lactated Ringers infusion  100 mL/hr IntraVENous CONTINUOUS    nitroglycerin (NITROSTAT) tablet 0.4 mg  0.4 mg SubLINGual Q5MIN PRN    morphine injection 2 mg  2 mg IntraVENous Q4H PRN      No Known Allergies     Review of Systems: A complete review of systems was obtained, negative except as described above.     Physical Exam:   Visit Vitals  BP 94/60   Pulse 87   Temp 97.6 °F (36.4 °C)   Resp 20   Ht 5' 8\" (1.727 m)   Wt 102 lb (46.3 kg)   SpO2 93%   BMI 15.51 kg/m²     ECOG PS: 2-3  General: No distress, cachectic  Eyes: PERRLA, anicteric sclerae  HENT: Atraumatic   Neck: Supple  Respiratory: Normal respiratory effort. On   MS: states can walk a few feet. Has muscle wasting  Skin: No rashes, ecchymoses, or petechiae. Normal temperature, turgor, and texture. Neuro/Psych: Moves all 4 extremities. Alert, oriented, appropriate affect, normal judgment/insight      Results:     Lab Results   Component Value Date/Time    WBC 14.5 (H) 2022 03:07 AM    HGB 9.2 (L) 2022 03:07 AM    HCT 27.1 (L) 2022 03:07 AM    PLATELET 932 (H)  03:07 AM    MCV 96.4 2022 03:07 AM    ABS. NEUTROPHILS 10.9 (H) 2022 03:07 AM     Lab Results   Component Value Date/Time    Sodium 131 (L) 2022 03:07 AM    Potassium 4.4 2022 03:07 AM    Chloride 99 2022 03:07 AM    CO2 24 2022 03:07 AM    Glucose 95 2022 03:07 AM    BUN 12 2022 03:07 AM    Creatinine 0.42 (L) 2022 03:07 AM    GFR est AA >60 2022 03:07 AM    GFR est non-AA >60 2022 03:07 AM    Calcium 8.5 2022 03:07 AM     Lab Results   Component Value Date/Time    Bilirubin, total 0.4 2022 04:39 PM    ALT (SGPT) 18 2022 04:39 PM    Alk.  phosphatase 128 (H) 2022 04:39 PM    Protein, total 7.7 2022 04:39 PM    Albumin 2.7 (L) 2022 04:39 PM    Globulin 5.0 (H) 2022 04:39 PM     Lab Results   Component Value Date/Time    Iron 34 (L) 2022 05:23 AM    TIBC 202 (L) 2022 05:23 AM    Iron % saturation 17 (L) 2022 05:23 AM    Ferritin 1,008 (H) 2022 05:29 AM     Lab Results   Component Value Date/Time    Vitamin B12 1,525 (H) 2022 05:23 AM    Folate 11.1 2022 05:23 AM     Lab Results   Component Value Date/Time    TSH 1.59 2022 05:23 AM       Imagin/27/22 CTA chest, abd/pelvis:  FINDINGS:  THORAX: There is a right perihilar lung mass measuring 4.9 x 2.6 cm, with  numerous nodules along delayed right pleural surface, enlarged mediastinal lymph  nodes, and a moderate right pleural effusion. There is left supraclavicular  prepectoral lymphadenopathy. Multiple bilateral parenchymal lung nodules  consistent with metastatic disease. Significant atelectasis in the right lower  lobe. Heart size is normal. No pericardial effusion. No pneumothorax. LIVER: Subcapsular lesions on the surface of the right lobe of the liver  consistent with metastatic disease. Hypodense lesion in segment 4 (3:139),  consistent with intrahepatic metastasis. GALLBLADDER: No calcified gallstone  SPLEEN: Unremarkable  PANCREAS: No mass or ductal dilatation. ADRENALS: Bilateral adrenal gland lesions consistent with metastatic disease. KIDNEYS/URETERS: Symmetric renal enhancement. No solid renal lesion. PERITONEUM: No ascites. Soft tissue nodules in the right paracolic gutter (9:76)  and anterior to the right kidney (3:71), consistent with metastatic disease. Soft tissue nodule adjacent to the left psoas muscle (3:175), also consistent  with metastatic disease. STOMACH: Unremarkable. SMALL BOWEL: No dilatation or wall thickening. COLON: No dilatation or wall thickening. APPENDIX: Nonvisualized  PELVIS: Compression of the urinary bladder by the expansile left hemipelvic  lesion with associated soft tissue. Prostate gland is normal. No free fluid in  the pelvis. BONES: Expansile destructive lesion of the left scapula, glenoid, left  hemipelvis in addition to multiple lytic lesions of the pelvis, sacrum (S1), and  superior aspect of the L1 vertebral body. Small lytic lesions are noted at the  superior aspect of the T8 vertebral body. Pathologic fracture of the spinous  process of T1 with associated lytic lesion. VESSELS: Normal caliber thoracic aorta, aortic arch vessels, abdominal aorta,  and iliac vessels without aneurysm. Atherosclerotic plaque of the iliofemoral  vasculature.  Pulmonary arteries normal in caliber. No filling defects. IMPRESSION  No evidence of aortic dissection or pulmonary embolism. Metastatic malignancy likely originating from the right lung, with extensive  evidence of metastatic disease including diffuse bone metastases, diffuse right  pleural disease and bilateral lung parenchymal disease, peritoneal nodules,  adrenal metastases, and liver lesions. Oncologic consultation recommended. Assessment and Recommendations:   Jesus Dominguez is a 58 y.o. male with PAD admitted with left shoulder pain related to presumed metastatic lung cancer. 1. Right lung mass / R pleural effusion:  CT imaging is concerning for a metastatic lung cancer with a R perihilar lung mass, mediastinal lymphadenopathy, R pleural effusion, adrenal and bone metastases. Seen today for first time fu. In bed thin on 02 NC. Alert, conversant. Had biopsy today and path pending. Discussed possible cancer dx. Discussed staging which would usually include brain MRI. Discussed limited performance status overall. Discussed local treatment option of radiation. Discussed systemic therapy options such as chemo/ IO. Would wait for path for final treatment decision. Did discuss supportive care only as an option also. Pt seeing palliative care. We will follow for path. 2. Bone metastases:  2/2 underlying malignancy. 3. Peripheral artery disease / CAD / NSTEMI:  S/p femoral/peroneal bypass. On ASA. 4. Hyponatremia:  May be SIADH 2/2 lung cancer. 5. Normocytic anemia:   Likely anemia of chronic disease. No iron/B12/folate deficiency noted. We will follow for path  Call if questions  We can see as outpt if d/c'd. .     Signed By: Helga Robison DO     August 29, 2022

## 2022-08-29 NOTE — H&P
INTERVENTIONAL RADIOLOGY  Preoperative History and Physical      Patient:  Samantha Noble  :  1960  Age:  58 y.o. MRN:  [id number]  Today's Date:  2022      CC / HPI   Samantha Noble is a 58 y.o. male with a history of metastatic malignancy who presents for US guided left scapular mass biopsy. PAST MEDICAL HISTORY  Past Medical History:   Diagnosis Date    Arthritis     GERD (gastroesophageal reflux disease)        PAST SURGICAL HISTORY  Past Surgical History:   Procedure Laterality Date    HX APPENDECTOMY      AS CHILD    HX ENDOSCOPY          HX VASCULAR STENT      X3 STENTS GROIN    HX WISDOM TEETH EXTRACTION      ALL TEETH REMOVED       SOCIAL HISTORY  Social History     Socioeconomic History    Marital status: SINGLE     Spouse name: Not on file    Number of children: Not on file    Years of education: Not on file    Highest education level: Not on file   Occupational History    Not on file   Tobacco Use    Smoking status: Every Day     Packs/day: 0.50     Years: 40.00     Pack years: 20.00     Types: Cigarettes    Smokeless tobacco: Never   Vaping Use    Vaping Use: Never used   Substance and Sexual Activity    Alcohol use:  Yes     Alcohol/week: 2.0 standard drinks     Types: 2 Cans of beer per week     Comment: DAILY    Drug use: Yes     Types: Marijuana    Sexual activity: Not on file   Other Topics Concern    Not on file   Social History Narrative    Not on file     Social Determinants of Health     Financial Resource Strain: Not on file   Food Insecurity: Not on file   Transportation Needs: Not on file   Physical Activity: Not on file   Stress: Not on file   Social Connections: Not on file   Intimate Partner Violence: Not on file   Housing Stability: Not on file       FAMILY HISTORY  Family History   Problem Relation Age of Onset    Heart Disease Mother         HEART ATTACK     Emphysema Father     No Known Problems Sister     COPD Brother     Lung Disease Brother     Other Brother MOTOR VEHICLE ACCIDENT    No Known Problems Daughter     No Known Problems Daughter     No Known Problems Daughter     No Known Problems Son     Anesth Problems Neg Hx        CURRENT MEDICATIONS  Current Facility-Administered Medications   Medication Dose Route Frequency Provider Last Rate Last Admin    fentaNYL citrate (PF) injection  mcg   mcg IntraVENous RAD PRN Ashli Steen MD        midazolam (VERSED) injection 0.5-5 mg  0.5-5 mg IntraVENous Rad Multiple Xander Hayden MD        flumazeniL (ROMAZICON) 0.1 mg/mL injection 0.5 mg  0.5 mg IntraVENous RAD PRN Xander Hayden MD        naloxone (NARCAN) injection 1 mg  1 mg IntraVENous ONCE Ashil Rubalcava MD        0.9% sodium chloride infusion  25 mL/hr IntraVENous RAD CONTINUOUS Ashli Rubalcava MD        albuterol-ipratropium (DUO-NEB) 2.5 MG-0.5 MG/3 ML  3 mL Nebulization QID RT Maryan Schrader Mom, NP        aspirin delayed-release tablet 81 mg  81 mg Oral 7am Bentley Doty MD   81 mg at 08/28/22 0635    sodium chloride (NS) flush 5-40 mL  5-40 mL IntraVENous Q8H Bentley Doty MD   10 mL at 08/29/22 0725    sodium chloride (NS) flush 5-40 mL  5-40 mL IntraVENous PRN Bentley Doty MD        acetaminophen (TYLENOL) tablet 650 mg  650 mg Oral Q6H PRN Bentley Doty MD   650 mg at 08/28/22 1725    Or    acetaminophen (TYLENOL) suppository 650 mg  650 mg Rectal Q6H PRN Bentley Doty MD        polyethylene glycol (MIRALAX) packet 17 g  17 g Oral DAILY PRN Bentley Doty MD        ondansetron (ZOFRAN ODT) tablet 4 mg  4 mg Oral Q8H PRN Bentley Doty MD        Or    ondansetron (ZOFRAN) injection 4 mg  4 mg IntraVENous Q6H PRN Bentley Doty MD        L.acidophilus-paracasei-S.thermophil-bifidobacter (RISAQUAD) 8 billion cell capsule  1 Capsule Oral DAILY Bentley Doty MD   1 Capsule at 08/29/22 0907    metoprolol tartrate (LOPRESSOR) tablet 25 mg  25 mg Oral Q12H Bentley Doty MD   25 mg at 08/29/22 0907    oxyCODONE IR (ROXICODONE) tablet 5 mg  5 mg Oral Q4H PRN Wing MANUEL MD   5 mg at 08/29/22 9627    enoxaparin (LOVENOX) injection 30 mg  30 mg SubCUTAneous Q24H Wing MANUEL MD   30 mg at 08/28/22 1545    lactated Ringers infusion  100 mL/hr IntraVENous CONTINUOUS Bentley Doty  mL/hr at 08/29/22 0314 100 mL/hr at 08/29/22 0314    nitroglycerin (NITROSTAT) tablet 0.4 mg  0.4 mg SubLINGual Q5MIN PRN Bentley Doty MD        morphine injection 2 mg  2 mg IntraVENous Q4H PRN Bentley Camacho MD   2 mg at 08/28/22 0932       ALLERGIES  No Known Allergies    DIAGNOSTIC STUDIES   IMAGING STUDIES  Relevant Imaging studies reviewed:  CT Results (most recent):  Results from Hospital Encounter encounter on 08/27/22    CTA CHEST W OR W WO CONT    Narrative  INDICATION:  Chest pain, eval for aortic dissection    COMPARISON:  None    TECHNIQUE:   After the rapid bolus administration of 100 cc of Isovue-370, then  thin section helical images were obtained through the chest, abdomen, and  pelvis. 3D image postprocessing and maximum intensity projections were  performed. CT dose reduction was achieved through use of a standardized  protocol tailored for this examination and automatic exposure control for dose  modulation. FINDINGS:  THORAX: There is a right perihilar lung mass measuring 4.9 x 2.6 cm, with  numerous nodules along delayed right pleural surface, enlarged mediastinal lymph  nodes, and a moderate right pleural effusion. There is left supraclavicular  prepectoral lymphadenopathy. Multiple bilateral parenchymal lung nodules  consistent with metastatic disease. Significant atelectasis in the right lower  lobe. Heart size is normal. No pericardial effusion. No pneumothorax. LIVER: Subcapsular lesions on the surface of the right lobe of the liver  consistent with metastatic disease. Hypodense lesion in segment 4 (3:139),  consistent with intrahepatic metastasis.   GALLBLADDER: No calcified gallstone  SPLEEN: Unremarkable  PANCREAS: No mass or ductal dilatation. ADRENALS: Bilateral adrenal gland lesions consistent with metastatic disease. KIDNEYS/URETERS: Symmetric renal enhancement. No solid renal lesion. PERITONEUM: No ascites. Soft tissue nodules in the right paracolic gutter (3:20)  and anterior to the right kidney (3:71), consistent with metastatic disease. Soft tissue nodule adjacent to the left psoas muscle (3:175), also consistent  with metastatic disease. STOMACH: Unremarkable. SMALL BOWEL: No dilatation or wall thickening. COLON: No dilatation or wall thickening. APPENDIX: Nonvisualized  PELVIS: Compression of the urinary bladder by the expansile left hemipelvic  lesion with associated soft tissue. Prostate gland is normal. No free fluid in  the pelvis. BONES: Expansile destructive lesion of the left scapula, glenoid, left  hemipelvis in addition to multiple lytic lesions of the pelvis, sacrum (S1), and  superior aspect of the L1 vertebral body. Small lytic lesions are noted at the  superior aspect of the T8 vertebral body. Pathologic fracture of the spinous  process of T1 with associated lytic lesion. VESSELS: Normal caliber thoracic aorta, aortic arch vessels, abdominal aorta,  and iliac vessels without aneurysm. Atherosclerotic plaque of the iliofemoral  vasculature. Pulmonary arteries normal in caliber. No filling defects. Impression  No evidence of aortic dissection or pulmonary embolism. Metastatic malignancy likely originating from the right lung, with extensive  evidence of metastatic disease including diffuse bone metastases, diffuse right  pleural disease and bilateral lung parenchymal disease, peritoneal nodules,  adrenal metastases, and liver lesions. Oncologic consultation recommended.       LABS  Lab Results   Component Value Date/Time    WBC 14.5 (H) 08/29/2022 03:07 AM    HGB 9.2 (L) 08/29/2022 03:07 AM    HCT 27.1 (L) 08/29/2022 03:07 AM    PLATELET 614 (H) 08/29/2022 03:07 AM    MCV 96.4 08/29/2022 03:07 AM     Lab Results   Component Value Date/Time    Sodium 131 (L) 08/29/2022 03:07 AM    Potassium 4.4 08/29/2022 03:07 AM    Chloride 99 08/29/2022 03:07 AM    CO2 24 08/29/2022 03:07 AM    Anion gap 8 08/29/2022 03:07 AM    Glucose 95 08/29/2022 03:07 AM    BUN 12 08/29/2022 03:07 AM    Creatinine 0.42 (L) 08/29/2022 03:07 AM    BUN/Creatinine ratio 29 (H) 08/29/2022 03:07 AM    GFR est AA >60 08/29/2022 03:07 AM    GFR est non-AA >60 08/29/2022 03:07 AM    Calcium 8.5 08/29/2022 03:07 AM     Lab Results   Component Value Date/Time    INR 1.0 04/25/2022 12:51 AM    Prothrombin time 10.1 04/25/2022 12:51 AM       PHYSICAL EXAM   BP 97/73 (BP 1 Location: Right arm, BP Patient Position: At rest)   Pulse 84   Temp 98.4 °F (36.9 °C)   Resp 25   Ht 5' 8\" (1.727 m)   Wt 46.3 kg (102 lb)   SpO2 97%   BMI 15.51 kg/m²   General:  NAD  Heart:  RRR  Lungs:  NWOB  Neurological:  AAOX3    PLAN   Procedure to be performed:  US guided left scapular mass biopsy  Plan for sedation:  moderate  Post procedure plan:  observation per protocol  Informed consent:  risks, benefits, and alternatives reviewed with the patient / family who agree to proceed  Code status:  Full Code      Michael, [address] [facility], [facility].

## 2022-08-29 NOTE — ROUTINE PROCESS
0915: TRANSFER - IN REPORT:    Verbal report received from Michael Gregory RN(name) on Zan Ho  being received from [facility](unit) for ordered procedure      Report consisted of patients Situation, Background, Assessment and   Recommendations(SBAR). Information from the following report(s) SBAR, Intake/Output, MAR, Recent Results, Cardiac Rhythm NSR, Alarm Parameters , and Procedure Verification was reviewed with the receiving nurse. Opportunity for questions and clarification was provided. Assessment completed upon patients arrival to unit and care assumed. 0940: Pt arrived to angio department for ultrasound guided bone biopsy with moderate sedation. Name of procedure: Ultrasound guided bone biopsy    Sedation medication given: 1 mg versed and 75 mcg fentanyl. Sedation tolerated: Well    Total sedation time: 25 minutes    Vital Signs: Stable    1122: TRANSFER - OUT REPORT:    Verbal report given to Michael Gregory RN(name) on Zan Ho  being transferred to [facility](unit) for routine post - op       Report consisted of patients Situation, Background, Assessment and   Recommendations(SBAR). Information from the following report(s) Procedure Summary, MAR, Recent Results, and Cardiac Rhythm NSR  was reviewed with the receiving nurse. Lines:   Peripheral IV 08/27/22 Right Antecubital (Active)   Site Assessment Clean, dry, & intact 08/28/22 2000   Phlebitis Assessment 0 08/28/22 2000   Infiltration Assessment 0 08/28/22 2000   Dressing Status Clean, dry, & intact 08/28/22 2000   Dressing Type Transparent;Tape 08/28/22 2000   Hub Color/Line Status Pink; Infusing 08/28/22 2000   Action Taken Open ports on tubing capped 08/28/22 2000   Alcohol Cap Used Yes 08/28/22 2000       Peripheral IV 17/89/69 Left Basilic (Active)   Site Assessment Clean, dry, & intact 08/28/22 2000   Phlebitis Assessment 0 08/28/22 2000   Infiltration Assessment 0 08/28/22 2000   Dressing Status Clean, dry, & intact 08/28/22 2000 Dressing Type Transparent;Tape 08/28/22 2000   Hub Color/Line Status Blue;Capped 08/28/22 2000   Action Taken Open ports on tubing capped 08/28/22 2000   Alcohol Cap Used Yes 08/28/22 2000        Opportunity for questions and clarification was provided.       Patient transported with:   "[employer]

## 2022-08-29 NOTE — PROGRESS NOTES
Transition of Care Plan  RUR- Low 13%   DISPOSITION: The disposition plan is home with family assistance; pending medical progression  F/U with PCP/Specialist    Transport: W/C [facility]tilia [facility] vs. BLS   Following: Palliative, Pulmonary, Hem and Oncology,    Completed Procedure: CT-guided biopsy of lung mass\"8/29\"-Results Pending   O2: 4L- Room Air at baseline  Pending Referral: Spiritual care \"AMD\"         Reason for Admission:  NSTEMI (non-ST elevated myocardial infarction) ([facility] [address]                     Plan for utilizing home health:      No pending order for Therapy     PCP: First and Last name:  None     Name of Practice:    Are you a current patient: Yes/No:    Approximate date of last visit:    Can you participate in a virtual visit with your PCP:                     Current Advanced Directive/Advance Care Plan: DNR      Healthcare Decision Maker:   Click here to complete [address] including selection of the Healthcare Decision Maker Relationship (ie \"Primary\")             Primary Decision Maker: Adriana Barrera - Daughter - [phone]                  Transition of Care Plan: This cm met the pt at bedside to conduct the initial evaluation. The pt presented as aox4. The pt confirmed his demographics and insurance provider. The pt reported that he is interested in securing a PCP-No preference. The pt reported that his primary pharmacy is: [facility] the [facility]. The pt reported that he is currently unemployed. He reported that he was last employed by [employer] as a  in April. The pt reported that he is unsure as to if he is still employed. The pt reported that he lives in a 2 story home with 20 interior stairs. 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 The pt reported that prior to this admission he was independent with Adls and IADls at baseline but completed the task slowly. The pt reported that he has the listed DME: RW.  The pt reported thst he does not have hx of HH or Rehab. The pt reported that he has good family support which consist of his Daughter adriano Alfaro son. Care Management Interventions  PCP Verified by CM:  Yes  Mode of Transport at Discharge: [address] (CM Consult): Discharge Planning  MyChart Signup: No  Discharge Durable Medical Equipment: No  Physical Therapy Consult: No  Occupational Therapy Consult: No  Speech Therapy Consult: No  Support Systems: Other Family Member(s), Child(charleen)  Confirm Follow Up Transport: Family  The Patient and/or Patient Representative was Provided with a Choice of Provider and Agrees with the Discharge Plan?: Yes  Freedom of Choice List was Provided with Basic Dialogue that Supports the Patient's Individualized Plan of Care/Goals, Treatment Preferences and Shares the Quality Data Associated with the Providers?: Yes  Discharge Location  Patient Expects to be Discharged to[de-identified] Home      CM: [address] [place],   [phone]

## 2022-08-29 NOTE — PROGRESS NOTES
0730 Bedside shift change report given to Harrison Queen (oncoming nurse) by Olga Molina (offgoing nurse). Report included the following information SBAR, Kardex, Procedure Summary, Intake/Output, MAR, and Recent Results. 2000 Bedside shift change report given to Anatoliy Biggs (oncoming nurse) by Harrison Queen (offgoing nurse). Report included the following information SBAR, Kardex, ED Summary, OR Summary, Procedure Summary, Intake/Output, MAR, and Recent Results.

## 2022-08-29 NOTE — PROGRESS NOTES
Spiritual Care Assessment/Progress Note  [facility]      NAME: Evaristo Casanova      MRN: [id number]  AGE: 58 y.o.  SEX: male  Yarsanism Affiliation: No preference   Language: English     8/29/2022     Total Time (in minutes): 15     Spiritual Assessment begun in [facility] CV SERVICES UNIT through conversation with:         [x]Patient        [] Family    [] Friend(s)        Reason for Consult: Palliative Care, Initial/Spiritual Assessment     Spiritual beliefs: (Please include comment if needed)     [] Identifies with a dee dee tradition:         [] Supported by a dee dee community:            [x] Claims no spiritual orientation:           [] Seeking spiritual identity:                [] Adheres to an individual form of spirituality:           [] Not able to assess:                           Identified resources for coping:      [] Prayer                               [] Music                  [] Guided Imagery     [x] Family/friends                 [] Pet visits     [] Devotional reading                         [] Unknown     [] Other:                                               Interventions offered during this visit: (See comments for more details)    Patient Interventions: Initial visit, Prayer (assurance of), Coping skills reviewed/reinforced, Yarsanism beliefs/image of God discussed           Plan of Care:     [] Support spiritual and/or cultural needs    [] Support AMD and/or advance care planning process      [] Support grieving process   [] Coordinate Rites and/or Rituals    [] Coordination with community clergy   [] No spiritual needs identified at this time   [] Detailed Plan of Care below (See Comments)  [] Make referral to Music Therapy  [] Make referral to Pet Therapy     [] Make referral to Addiction services  [] Make referral to [facility]  [] Make referral to Spiritual Care Partner  [] No future visits requested        [x] Contact Spiritual Care for further referrals     Comments: I visited with Mani Walters at [facility] in [facility] CV SERVICES UNIT and introduced Spiritual Care Services. I reviewed patient's chart prior to this encounter; initiated a relationship of care and concern; explored spiritual beliefs; emotional concerns; and coping strategies. Spiritual Assessment: Uncertain about the future    I invited Mani Walters to share his feelings/concerns. He reports that he does not have Jewish beliefs. His sources of strength are unknown. He described the day/night change in his life since his diagnoses and reflected on potential outcomes. I provided empathic listening and reassurance of prayer. Mani Walters expressed appreciation for my visit and care. I informed Mani Walters about availability and encouraged them to request additional support as needed. Rev.  Mini Lu MDiv, MS, [facility]  Staff

## 2022-08-29 NOTE — PROGRESS NOTES
1930: Bedside and Verbal shift change report given to KEELY Good (oncoming nurse) by Tammy Hawthorne RN (offgoing nurse).  Report included the following information SBAR, Kardex, Intake/Output, MAR, Recent Results, and Cardiac Rhythm SR .

## 2022-08-29 NOTE — CONSULTS
Palliative Medicine Consult  Scott: [phone])    Patient Name: Gracelyn Oppenheim  YOB: 1960    Date of Initial Consult: 8/29/22  Reason for Consult: care decisions- L shoulder and chest pain,due to likely metastatic lung cancer   Requesting Provider: Jamin Interiano- Oncology    Primary Care Physician: None     SUMMARY:   Gracelyn Oppenheim is a 58 y.o. with a past history of peripheral artery disease s/p femoral/peroneal bypass 4/2022, CAD who was admitted on 8/27/2022 from home with L shoulder and L chest pain. CT chest done- showing R lung mass and pulmonary nodules, pleural effusion, diffuse bone mets (L scapula, spine, pelvis), peritoneal nodules, adrenal and liver lesions consistent w/ widespread metastatic disease. Oncology has met w/ him to talk about his likely metastatic lung cancer- could be small cell. On CXR 4/2022 there were no acute findings. Pt just underwent bx of scapular mass 8/29. He has had unintentional weight loss of 15lbs although always on the thin side. Has already met with oncology. Social: Pt is  but still on good terms with ex wife- he has 4 children incl dtr Jovanni Martin who is local. Lives w/ his brother Gilbert Stephens who is on hospice for COPD. They live in a 2nd story apartment w/out an elevator and has been getting harder for pt to get up/down stairs. He uses an electric tricycle to get around and go grocery shopping (lives in the fan). Worked as a  for many years, stopped working early this year due to his vascular disease. PALLIATIVE DIAGNOSES:   Bone pain- nehemias L shoulder and spine pain due to likely malignancy   Weight loss- unintentional   Generalized weakness/debility   Goals of care/advance care planning/code status   Palliative care encounter        PLAN:   Pt awake, alert, oriented and understands that medical team worried that he has lung cancer that has spread.  We are especially concerned that this is a fast growing type of cancer (CXR 4/2022 w/out any acute findings), and he is aware that certain treatment options from Oncology may not be a good option. He seems to have quite good insight into his condition. Goals of care:He just underwent bx today, so will need to await pathology before we can have further discussion. He is open to radiation for pain control and would like to know his treatment options- however he also realizes that we may be recommending hospice care. His brother Abby Catalan is on hospice for COPD and they live together- so he understands the philosophy of hospice care. Functionally it is very hard for him to go up/down his 15 stairs to his apartment and he is aware he and his brother may need to find a new place. Code status: Pt with full code status upon admission , but very clear that he would not want resuscitative efforts in the face of cardiopulmonary arrest. His brother has a DNR status. Code status changed to DNR/I and will need DDNR before discharges. Advance care planning: Pt does not have an AMD. Thinks that he would trusts his dtr Leila Dakins to make decisions on behalf, or even his ex wife. Will let me know and we can do AMD this admission. Pain control: Due to likely bone mets. Causing most pain in L shoulder and spine. Shoulder pain has been going on for several weeks and first though he hurt it by carrying groceries. No hx of chronic pain or opioid use. Has been getting some relief w/ oxycodone 5mg tablets but does not help pain enough and does not last through the night- pt cannot sleep. Not causing confusion or sedation, but BP has been  low since admission (currently  w/ systolic in high 09X) although often w/ SBP 90-100s. Does not have sx when BP this low, but may limit opioid usage/dose titration. Oxycodone 5-10mg every 3h prn pain-- as long as asx, would be comfortable with these medications even w/ low BP nehemias if >90. Schedule dose at bedtime.  Likely would benefit from long acting oxycodone , but as opioid naive not starting yet. Dexamethasone 4mg daily for bone pain, also added ppi. Scheduled Tylenol. Agree with radiation oncology assessment. If candidate for radiation, may be good if we start tx during inpatient admission. Transportation may be an issue- will talk w/ care management. Left message w/ dtr Lewis Verde to update her. Initial consult note routed to primary continuity provider and/or primary health care team members  Communicated plan of care with: Palliative IDT, [facility] Team incl Dr Jocelyn Potts and Beni Nick RN     GOALS OF CARE / TREATMENT PREFERENCES:     GOALS OF CARE:  Patient/Health Care Proxy Stated Goals: Prolong life    TREATMENT PREFERENCES:   Code Status: DNR    Patient and family's personal goals include to get a dx. Advance Care Planning:  [x] [facility] Interdisciplinary Team has updated the ACP Navigator with Health Care Decision Maker and Patient Capacity      Primary Decision Maker: Raoul Hamman - Daughter - [phone]    Advance Care Planning 4/25/2022   Confirm Advance Directive None   Patient Would Like to Complete Advance Directive No   Does the patient have other document types (No Data)       Medical Interventions: Limited additional interventions       Other:    As far as possible, the palliative care team has discussed with patient / health care proxy about goals of care / treatment preferences for patient. HISTORY:     History obtained from: chart, staff, patient     CHIEF COMPLAINT: \"My shoulder hurts\"    HPI/SUBJECTIVE:    The patient is:   [x] Verbal and participatory  [] Non-participatory due to: Pt awake, alert and engaging. He has good insight into his possible dx. He has pain in shoulder (L), spine that is not getting better and is impairing his sleep.      Clinical Pain Assessment (nonverbal scale for severity on nonverbal patients):   Clinical Pain Assessment  Severity: 6  Location: L shoulder, spine  Character: aching, deep  Duration: Several weeks  Effect: Movement is harder  Factors: medication helps  Frequency: constant          Duration: for how long has pt been experiencing pain (e.g., 2 days, 1 month, years)  Frequency: how often pain is an issue (e.g., several times per day, once every few days, constant)     FUNCTIONAL ASSESSMENT:     Palliative Performance Scale (PPS):  PPS: 50       PSYCHOSOCIAL/SPIRITUAL SCREENING:     Palliative IDT has assessed this patient for cultural preferences / practices and a referral made as appropriate to needs (Cultural Services, Patient Advocacy, Ethics, etc.)    Any spiritual / Latter day concerns:  [] Yes /  [x] No   If \"Yes\" to discuss with pastoral care during IDT     Does caregiver feel burdened by caring for their loved one:   [] Yes /  [x] No /  [] No Caregiver Present/Available [] No Caregiver [] Pt Lives at [place] [id number]  If \"Yes\" to discuss with social work during IDT    Anticipatory grief assessment:   [x] Normal  / [] Maladaptive     If \"Maladaptive\" to discuss with social work during IDT    ESAS Anxiety: Anxiety: 0    ESAS Depression: Depression: 0        REVIEW OF SYSTEMS:     Positive and pertinent negative findings in ROS are noted above in HPI. The following systems were [x] reviewed / [] unable to be reviewed as noted in HPI  Other findings are noted below. Systems: constitutional, ears/nose/mouth/throat, respiratory, gastrointestinal, genitourinary, musculoskeletal, integumentary, neurologic, psychiatric, endocrine. Positive findings noted below. Modified ESAS Completed by: provider   Fatigue: 6 Drowsiness: 0   Depression: 0 Pain: 6   Anxiety: 0 Nausea: 0   Anorexia: 8 Dyspnea: 1                    PHYSICAL EXAM:     From RN flowsheet:  Wt Readings from Last 3 Encounters:   08/29/22 102 lb (46.3 kg)   04/28/22 115 lb 8.3 oz (52.4 kg)   04/25/22 115 lb 8.3 oz (52.4 kg)     Blood pressure 93/67, pulse 75, temperature 98.4 °F (36.9 °C), resp.  rate 20, height 5' 8\" (1.727 m), weight 102 lb (46.3 kg), SpO2 93 %. Pain Scale 1: Numeric (0 - 10)  Pain Intensity 1:  (Patient off the unit)     Pain Location 1: Shoulder  Pain Orientation 1: Left  Pain Description 1: Aching, Constant  Pain Intervention(s) 1: Medication (see MAR)  Last bowel movement, if known:     Constitutional: awake, alert, oriented, no sedation or confusion, very thin w/ generalized muscle wasting   Eyes: pupils equal, anicteric  ENMT: no nasal discharge, moist mucous membranes  Cardiovascular: regular rhythm  Respiratory: breathing not labored, coarse breath sounds, decr at bases  Gastrointestinal: soft non-tender, +bowel sounds  Musculoskeletal: no deformity, no tenderness to palpation  Skin: warm, dry  Neurologic: following commands, moving all extremities  Psychiatric: full affect, no hallucinations         HISTORY:     Principal Problem:    NSTEMI (non-ST elevated myocardial infarction) ([facility] [address] (8/27/2022)    Past Medical History:   Diagnosis Date    Arthritis     GERD (gastroesophageal reflux disease)       Past Surgical History:   Procedure Laterality Date    HX APPENDECTOMY      AS CHILD    HX ENDOSCOPY      1990's    HX VASCULAR STENT      X3 STENTS GROIN    HX WISDOM TEETH EXTRACTION      ALL TEETH REMOVED      Family History   Problem Relation Age of Onset    Heart Disease Mother         HEART ATTACK     Emphysema Father     No Known Problems Sister     COPD Brother     Lung Disease Brother     Other Brother         MOTOR VEHICLE ACCIDENT    No Known Problems Daughter     No Known Problems Daughter     No Known Problems Daughter     No Known Problems Son     Anesth Problems Neg Hx       History reviewed, no pertinent family history. Social History     Tobacco Use    Smoking status: Every Day     Packs/day: 0.50     Years: 40.00     Pack years: 20.00     Types: Cigarettes    Smokeless tobacco: Never   Substance Use Topics    Alcohol use:  Yes     Alcohol/week: 2.0 standard drinks     Types: 2 Cans of beer per week     Comment: DAILY No Known Allergies   Current Facility-Administered Medications   Medication Dose Route Frequency    naloxone (NARCAN) injection 1 mg  1 mg IntraVENous ONCE    albuterol-ipratropium (DUO-NEB) 2.5 MG-0.5 MG/3 ML  3 mL Nebulization QID RT    oxyCODONE IR (ROXICODONE) tablet 5 mg  5 mg Oral Q3H PRN    oxyCODONE IR (ROXICODONE) tablet 10 mg  10 mg Oral Q3H PRN    [START ON 8/30/2022] pantoprazole (PROTONIX) tablet 40 mg  40 mg Oral ACB    dexAMETHasone (DECADRON) tablet 4 mg  4 mg Oral DAILY    acetaminophen (TYLENOL) tablet 650 mg  650 mg Oral TID    [START ON 8/30/2022] senna-docusate (PERICOLACE) 8.6-50 mg per tablet 2 Tablet  2 Tablet Oral DAILY    aspirin delayed-release tablet 81 mg  81 mg Oral 7am    sodium chloride (NS) flush 5-40 mL  5-40 mL IntraVENous Q8H    sodium chloride (NS) flush 5-40 mL  5-40 mL IntraVENous PRN    acetaminophen (TYLENOL) tablet 650 mg  650 mg Oral Q6H PRN    Or    acetaminophen (TYLENOL) suppository 650 mg  650 mg Rectal Q6H PRN    polyethylene glycol (MIRALAX) packet 17 g  17 g Oral DAILY PRN    ondansetron (ZOFRAN ODT) tablet 4 mg  4 mg Oral Q8H PRN    Or    ondansetron (ZOFRAN) injection 4 mg  4 mg IntraVENous Q6H PRN    L.acidophilus-paracasei-S.thermophil-bifidobacter (RISAQUAD) 8 billion cell capsule  1 Capsule Oral DAILY    metoprolol tartrate (LOPRESSOR) tablet 25 mg  25 mg Oral Q12H    enoxaparin (LOVENOX) injection 30 mg  30 mg SubCUTAneous Q24H    lactated Ringers infusion  100 mL/hr IntraVENous CONTINUOUS    nitroglycerin (NITROSTAT) tablet 0.4 mg  0.4 mg SubLINGual Q5MIN PRN    morphine injection 2 mg  2 mg IntraVENous Q4H PRN          LAB AND IMAGING FINDINGS:     Lab Results   Component Value Date/Time    WBC 14.5 (H) 08/29/2022 03:07 AM    HGB 9.2 (L) 08/29/2022 03:07 AM    PLATELET 894 (H) 26/15/0484 03:07 AM     Lab Results   Component Value Date/Time    Sodium 131 (L) 08/29/2022 03:07 AM    Potassium 4.4 08/29/2022 03:07 AM    Chloride 99 08/29/2022 03:07 AM    CO2 24 08/29/2022 03:07 AM    BUN 12 08/29/2022 03:07 AM    Creatinine 0.42 (L) 08/29/2022 03:07 AM    Calcium 8.5 08/29/2022 03:07 AM    Magnesium 1.7 08/29/2022 03:07 AM    Phosphorus 3.9 08/29/2022 03:07 AM      Lab Results   Component Value Date/Time    Alk. phosphatase 128 (H) 08/27/2022 04:39 PM    Protein, total 7.7 08/27/2022 04:39 PM    Albumin 2.7 (L) 08/27/2022 04:39 PM    Globulin 5.0 (H) 08/27/2022 04:39 PM     Lab Results   Component Value Date/Time    INR 1.0 04/25/2022 12:51 AM    Prothrombin time 10.1 04/25/2022 12:51 AM    aPTT 35.4 (H) 08/28/2022 05:23 AM      Lab Results   Component Value Date/Time    Iron 34 (L) 08/28/2022 05:23 AM    TIBC 202 (L) 08/28/2022 05:23 AM    Iron % saturation 17 (L) 08/28/2022 05:23 AM    Ferritin 1,008 (H) 08/28/2022 05:29 AM      No results found for: PH, PCO2, PO2  No components found for: GLPOC   No results found for: CPK, CKMB             Total time:   Counseling / coordination time, spent as noted above:   > 50% counseling / coordination?:     Prolonged service was provided for  []30 min   []75 min in face to face time in the presence of the patient, spent as noted above. Time Start:   Time End:   Note: this can only be billed with 85156 (initial) or 59763 (follow up). If multiple start / stop times, list each separately.

## 2022-08-30 NOTE — PROGRESS NOTES
[address]  Medical Oncology at [facility]      Hematology / Oncology Progress Note    Reason for Visit:   Hilary Martins is a 58 y.o. male who is seen in hospital follow up for presumed metastatic lung cancer. History of Present Illness:   Hilary Martins is a 58 y.o. male seen for follow up lung mass with PAD who presented with left shoulder and chest pain, found to have lung mass. Pt states he has had left shoulder pain and chest pain for approximately 2-3 weeks. He reports losing 15 lbs in the past month, but states he has always been thin - reports baseline weight 115-120. He underwent femoral/peroneal bypass in April 2022. States he has been walking with a walker and thinks this caused his shoulder pain. Smokes < 1ppd for past 45 years. Lives with his brother who is on hospice for COPD. Interval History: He is resting in bed. Still with left shoulder pain intermittently. Breathing is comfortable at rest though note increased oxygen requirements. Biopsy 8/29/22 and pathology pending. Past Medical History:   Diagnosis Date    Arthritis     GERD (gastroesophageal reflux disease)       Past Surgical History:   Procedure Laterality Date    HX APPENDECTOMY      AS CHILD    HX ENDOSCOPY      1990's    HX VASCULAR STENT      X3 STENTS GROIN    HX WISDOM TEETH EXTRACTION      ALL TEETH REMOVED      Social History     Tobacco Use    Smoking status: Every Day     Packs/day: 0.50     Years: 40.00     Pack years: 20.00     Types: Cigarettes    Smokeless tobacco: Never   Substance Use Topics    Alcohol use:  Yes     Alcohol/week: 2.0 standard drinks     Types: 2 Cans of beer per week     Comment: DAILY      Family History   Problem Relation Age of Onset    Heart Disease Mother         HEART ATTACK     Emphysema Father     No Known Problems Sister     COPD Brother     Lung Disease Brother     Other Brother         MOTOR VEHICLE ACCIDENT    No Known Problems Daughter     No Known Problems Daughter No Known Problems Daughter     No Known Problems Son     Anesth Problems Neg Hx      Current Facility-Administered Medications   Medication Dose Route Frequency    naloxone (NARCAN) injection 0.4 mg  0.4 mg IntraVENous EVERY 2 MINUTES AS NEEDED    albuterol-ipratropium (DUO-NEB) 2.5 MG-0.5 MG/3 ML  3 mL Nebulization QID RT    oxyCODONE IR (ROXICODONE) tablet 5 mg  5 mg Oral Q3H PRN    oxyCODONE IR (ROXICODONE) tablet 10 mg  10 mg Oral Q3H PRN    pantoprazole (PROTONIX) tablet 40 mg  40 mg Oral ACB    dexAMETHasone (DECADRON) tablet 4 mg  4 mg Oral DAILY    acetaminophen (TYLENOL) tablet 650 mg  650 mg Oral TID    senna-docusate (PERICOLACE) 8.6-50 mg per tablet 2 Tablet  2 Tablet Oral DAILY    aspirin delayed-release tablet 81 mg  81 mg Oral 7am    sodium chloride (NS) flush 5-40 mL  5-40 mL IntraVENous Q8H    sodium chloride (NS) flush 5-40 mL  5-40 mL IntraVENous PRN    acetaminophen (TYLENOL) tablet 650 mg  650 mg Oral Q6H PRN    Or    acetaminophen (TYLENOL) suppository 650 mg  650 mg Rectal Q6H PRN    polyethylene glycol (MIRALAX) packet 17 g  17 g Oral DAILY PRN    ondansetron (ZOFRAN ODT) tablet 4 mg  4 mg Oral Q8H PRN    Or    ondansetron (ZOFRAN) injection 4 mg  4 mg IntraVENous Q6H PRN    L.acidophilus-paracasei-S.thermophil-bifidobacter (RISAQUAD) 8 billion cell capsule  1 Capsule Oral DAILY    metoprolol tartrate (LOPRESSOR) tablet 25 mg  25 mg Oral Q12H    enoxaparin (LOVENOX) injection 30 mg  30 mg SubCUTAneous Q24H    lactated Ringers infusion  100 mL/hr IntraVENous CONTINUOUS    nitroglycerin (NITROSTAT) tablet 0.4 mg  0.4 mg SubLINGual Q5MIN PRN    morphine injection 2 mg  2 mg IntraVENous Q4H PRN      No Known Allergies     Review of Systems: A complete review of systems was obtained, negative except as described above.     Physical Exam:   Visit Vitals  BP 99/72 (BP 1 Location: Right arm, BP Patient Position: Lying)   Pulse 81   Temp 98 °F (36.7 °C)   Resp 22   Ht 5' 8\" (1.727 m)   Wt 102 lb (46.3 kg)   SpO2 95%   BMI 15.51 kg/m²     ECOG PS: 2-3  General: No distress, cachectic  Eyes: anicteric sclerae  HENT: Atraumatic   Neck: Supple  Respiratory: Normal respiratory effort. On   MS: states can walk a few feet. Has muscle wasting  Skin: No rashes, ecchymoses, or petechiae. Normal temperature, turgor, and texture. Neuro/Psych: Moves all 4 extremities. Alert, oriented, appropriate affect, normal judgment/insight      Results:     Lab Results   Component Value Date/Time    WBC 14.5 (H) 2022 03:07 AM    HGB 9.2 (L) 2022 03:07 AM    HCT 27.1 (L) 2022 03:07 AM    PLATELET 076 (H)  03:07 AM    MCV 96.4 2022 03:07 AM    ABS. NEUTROPHILS 10.9 (H) 2022 03:07 AM     Lab Results   Component Value Date/Time    Sodium 131 (L) 2022 03:07 AM    Potassium 4.4 2022 03:07 AM    Chloride 99 2022 03:07 AM    CO2 24 2022 03:07 AM    Glucose 95 2022 03:07 AM    BUN 12 2022 03:07 AM    Creatinine 0.42 (L) 2022 03:07 AM    GFR est AA >60 2022 03:07 AM    GFR est non-AA >60 2022 03:07 AM    Calcium 8.5 2022 03:07 AM     Lab Results   Component Value Date/Time    Bilirubin, total 0.4 2022 04:39 PM    ALT (SGPT) 18 2022 04:39 PM    Alk.  phosphatase 128 (H) 2022 04:39 PM    Protein, total 7.7 2022 04:39 PM    Albumin 2.7 (L) 2022 04:39 PM    Globulin 5.0 (H) 2022 04:39 PM     Lab Results   Component Value Date/Time    Iron 34 (L) 2022 05:23 AM    TIBC 202 (L) 2022 05:23 AM    Iron % saturation 17 (L) 2022 05:23 AM    Ferritin 1,008 (H) 2022 05:29 AM     Lab Results   Component Value Date/Time    Vitamin B12 1,525 (H) 2022 05:23 AM    Folate 11.1 2022 05:23 AM     Lab Results   Component Value Date/Time    TSH 1.59 2022 05:23 AM       Imagin/27/22 CTA chest, abd/pelvis:  FINDINGS:  THORAX: There is a right perihilar lung mass measuring 4.9 x 2.6 cm, with  numerous nodules along delayed right pleural surface, enlarged mediastinal lymph  nodes, and a moderate right pleural effusion. There is left supraclavicular  prepectoral lymphadenopathy. Multiple bilateral parenchymal lung nodules  consistent with metastatic disease. Significant atelectasis in the right lower  lobe. Heart size is normal. No pericardial effusion. No pneumothorax. LIVER: Subcapsular lesions on the surface of the right lobe of the liver  consistent with metastatic disease. Hypodense lesion in segment 4 (3:139),  consistent with intrahepatic metastasis. GALLBLADDER: No calcified gallstone  SPLEEN: Unremarkable  PANCREAS: No mass or ductal dilatation. ADRENALS: Bilateral adrenal gland lesions consistent with metastatic disease. KIDNEYS/URETERS: Symmetric renal enhancement. No solid renal lesion. PERITONEUM: No ascites. Soft tissue nodules in the right paracolic gutter (7:93)  and anterior to the right kidney (3:71), consistent with metastatic disease. Soft tissue nodule adjacent to the left psoas muscle (3:175), also consistent  with metastatic disease. STOMACH: Unremarkable. SMALL BOWEL: No dilatation or wall thickening. COLON: No dilatation or wall thickening. APPENDIX: Nonvisualized  PELVIS: Compression of the urinary bladder by the expansile left hemipelvic  lesion with associated soft tissue. Prostate gland is normal. No free fluid in  the pelvis. BONES: Expansile destructive lesion of the left scapula, glenoid, left  hemipelvis in addition to multiple lytic lesions of the pelvis, sacrum (S1), and  superior aspect of the L1 vertebral body. Small lytic lesions are noted at the  superior aspect of the T8 vertebral body. Pathologic fracture of the spinous  process of T1 with associated lytic lesion. VESSELS: Normal caliber thoracic aorta, aortic arch vessels, abdominal aorta,  and iliac vessels without aneurysm. Atherosclerotic plaque of the iliofemoral  vasculature.  Pulmonary arteries normal in caliber. No filling defects. IMPRESSION  No evidence of aortic dissection or pulmonary embolism. Metastatic malignancy likely originating from the right lung, with extensive  evidence of metastatic disease including diffuse bone metastases, diffuse right  pleural disease and bilateral lung parenchymal disease, peritoneal nodules,  adrenal metastases, and liver lesions. Oncologic consultation recommended. Assessment and Recommendations:   Woo English is a 58 y.o. male with PAD admitted with left shoulder pain related to presumed metastatic lung cancer. 1. Right lung mass / R pleural effusion:  CT imaging is concerning for a metastatic lung cancer with a R perihilar lung mass, mediastinal lymphadenopathy, R pleural effusion, adrenal and bone metastases. He underwent biopsy 8/29/22 and pathology pending  Rad/Onc consult pending for pain control at bony disease    Once pathology results, will discuss diagnosis in detail and potential treatment options  He will need brain MRI in the near future to complete staging  Palliative Care following    2. Bone metastases:  2/2 underlying malignancy  Rad/Onc consult pending     3. Peripheral artery disease / CAD / NSTEMI:  S/p femoral/peroneal bypass. On ASA. 4. Hyponatremia:  May be SIADH 2/2 lung cancer. 5. Normocytic anemia:   Likely anemia of chronic disease. No iron/B12/folate deficiency noted. We will follow for path    I personally saw and evaluated the patient and performed the key components of medical decision making. The history, physical exam, and documentation were performed by Lore Perez NP. I reviewed and verified the above documentation and modified it as needed.   Pt in bed doing ok  Still weak overall on 02  Path still pending  Seeing Rad/onc and palliative care  Will follow for path      Signed By: Isaac Alcantar DO     August 30, 2022

## 2022-08-30 NOTE — PROGRESS NOTES
Palliative Medicine Consult  Scott: [phone] ([phone])    Patient Name: Nghia Ordonez  YOB: 1960    Date of Initial Consult: 8/29/22  Reason for Consult: care decisions- L shoulder and chest pain,due to likely metastatic lung cancer   Requesting Provider: Alyssa Killian- Oncology    Primary Care Physician: None     SUMMARY:   Nghia Ordonez is a 58 y.o. with a past history of peripheral artery disease s/p femoral/peroneal bypass 4/2022, CAD who was admitted on 8/27/2022 from home with L shoulder and L chest pain. CT chest done- showing R lung mass and pulmonary nodules, pleural effusion, diffuse bone mets (L scapula, spine, pelvis), peritoneal nodules, adrenal and liver lesions consistent w/ widespread metastatic disease. Oncology has met w/ him to talk about his likely metastatic lung cancer- could be small cell. On CXR 4/2022 there were no acute findings. Pt just underwent bx of scapular mass 8/29. He has had unintentional weight loss of 15lbs although always on the thin side. Has already met with oncology. 8/30- Slept some last night. Was able to go down for CT simulation and work w/ therapy although was dizzy. Social: Pt is  but still on good terms with ex wife- he has 4 children incl dtr Miguetrung Carpenter who is local. Lives w/ his brother Patrecia Cowden who is on hospice for COPD. They live in a 2nd story apartment w/out an elevator and has been getting harder for pt to get up/down stairs. He uses an electric tricycle to get around and go grocery shopping (lives in the fan). Worked as a  for many years, stopped working early this year due to his vascular disease.       PALLIATIVE DIAGNOSES:   Bone pain- nehemias L shoulder and spine pain due to likely malignancy   Weight loss- unintentional   Generalized weakness/debility   Goals of care/advance care planning/code status   Palliative care encounter        PLAN:   Pain: with hx of some chronic lower back pain, may be related to his job w/ yue and now w/ likely osseous mets- diffuse. Reports that pain improved some w/ changes in medication today. Has used 4 doses of the 5mg Oxycodone IR in past 24h. Ensure that he knows he also has 10mg dose but states that he is not sure he needs that right now. No sedation or confusion. Cont to monitor dizziness and blood pressure. Cont Dexamethasone 4mg daily for now for short burst- will help as radiation starts. Oxycodone 5-10mg po every 3h prn pain. Will consider 10mg Oxycodone extended release at bedtime as pain interferes w/ sleep. Did get lightheaded with therapy but asx when lying down. Also need to monitor incr O2 needs. Cont scheduled Tylenol and bowel regimen. AMD: will see if pt wishes to do AMD tmrw, and can sign DDNR form at same time. Communicated plan of care with: Palliative IDT, [facility] Team incl Marshall Farias care management      GOALS OF CARE / TREATMENT PREFERENCES:     GOALS OF CARE:  Patient/Health Care Proxy Stated Goals: Prolong life    TREATMENT PREFERENCES:   Code Status: DNR    Patient and family's personal goals include to get a dx. Advance Care Planning:  [x] [facility] Interdisciplinary Team has updated the ACP Navigator with Health Care Decision Maker and Patient Capacity      Primary Decision Maker: Hanane Dewitt - [phone]    Advance Care Planning 4/25/2022   Confirm Advance Directive None   Patient Would Like to Complete Advance Directive No   Does the patient have other document types (No Data)       Medical Interventions: Limited additional interventions       Other:    As far as possible, the palliative care team has discussed with patient / health care proxy about goals of care / treatment preferences for patient. HISTORY:     History obtained from: chart, staff, patient     CHIEF COMPLAINT: \"I feel better today\"    HPI/SUBJECTIVE:    The patient is:   [x] Verbal and participatory  [] Non-participatory due to:      Pt awake, alert and engaging. He is eating his lunch- feel better and appears more comfortable than yesterday. +BM this morning. Did not sleep that well due to pain, and also at baseline has trouble sleeping. Clinical Pain Assessment (nonverbal scale for severity on nonverbal patients):   Clinical Pain Assessment  Severity: 4  Location: L shoulder, spine  Character: aching, deep  Duration: Several weeks  Effect: Movement is harder  Factors: medication helps  Frequency: constant          Duration: for how long has pt been experiencing pain (e.g., 2 days, 1 month, years)  Frequency: how often pain is an issue (e.g., several times per day, once every few days, constant)     FUNCTIONAL ASSESSMENT:     Palliative Performance Scale (PPS):  PPS: 60       PSYCHOSOCIAL/SPIRITUAL SCREENING:     Palliative IDT has assessed this patient for cultural preferences / practices and a referral made as appropriate to needs (Cultural Services, Patient Advocacy, Ethics, etc.)    Any spiritual / Mormonism concerns:  [] Yes /  [x] No   If \"Yes\" to discuss with pastoral care during IDT     Does caregiver feel burdened by caring for their loved one:   [] Yes /  [x] No /  [] No Caregiver Present/Available [] No Caregiver [] Pt Lives at Facility  If \"Yes\" to discuss with social work during IDT    Anticipatory grief assessment:   [x] Normal  / [] Maladaptive     If \"Maladaptive\" to discuss with social work during IDT    ESAS Anxiety: Anxiety: 0    ESAS Depression: Depression: 0        REVIEW OF SYSTEMS:     Positive and pertinent negative findings in ROS are noted above in HPI. The following systems were [x] reviewed / [] unable to be reviewed as noted in HPI  Other findings are noted below. Systems: constitutional, ears/nose/mouth/throat, respiratory, gastrointestinal, genitourinary, musculoskeletal, integumentary, neurologic, psychiatric, endocrine. Positive findings noted below.   Modified ESAS Completed by: provider   Fatigue: 6 Drowsiness: 0 Depression: 0 Pain: 4   Anxiety: 0 Nausea: 0   Anorexia: 8 Dyspnea: 1           Stool Occurrence(s): 1        PHYSICAL EXAM:     From RN flowsheet:  Wt Readings from Last 3 Encounters:   08/29/22 102 lb (46.3 kg)   04/28/22 115 lb 8.3 oz (52.4 kg)   04/25/22 115 lb 8.3 oz (52.4 kg)     Blood pressure 99/72, pulse 91, temperature 98 °F (36.7 °C), resp. rate 22, height 5' 8\" (1.727 m), weight 102 lb (46.3 kg), SpO2 95 %.     Pain Scale 1: Numeric (0 - 10)  Pain Intensity 1: 8  Pain Onset 1: chronic  Pain Location 1: Shoulder  Pain Orientation 1: Left  Pain Description 1: Constant  Pain Intervention(s) 1: Medication (see MAR)  Last bowel movement, if known: 8/30    Constitutional: awake, alert, oriented, no sedation or confusion, very thin w/ generalized muscle wasting   Eyes: pupils equal, anicteric  ENMT: no nasal discharge, moist mucous membranes  Cardiovascular: regular rhythm  Respiratory: breathing not labored, coarse breath sounds, decr at bases  Gastrointestinal: soft non-tender, +bowel sounds  Musculoskeletal: no deformity, no tenderness to palpation  Skin: warm, dry  Neurologic: following commands, moving all extremities  Psychiatric: full affect, no hallucinations         HISTORY:     Principal Problem:    NSTEMI (non-ST elevated myocardial infarction) ([facility] [address] (8/27/2022)    Active Problems:    Severe protein-calorie malnutrition ([facility] [address] (8/29/2022)    Past Medical History:   Diagnosis Date    Arthritis     GERD (gastroesophageal reflux disease)       Past Surgical History:   Procedure Laterality Date    HX APPENDECTOMY      AS CHILD    HX ENDOSCOPY      1990's    HX VASCULAR STENT      X3 STENTS GROIN    HX WISDOM TEETH EXTRACTION      ALL TEETH REMOVED      Family History   Problem Relation Age of Onset    Heart Disease Mother         HEART ATTACK     Emphysema Father     No Known Problems Sister     COPD Brother     Lung Disease Brother     Other Brother         MOTOR VEHICLE ACCIDENT    No Known Problems Daughter     No Known Problems Daughter     No Known Problems Daughter     No Known Problems Son     Anesth Problems Neg Hx       History reviewed, no pertinent family history. Social History     Tobacco Use    Smoking status: Every Day     Packs/day: 0.50     Years: 40.00     Pack years: 20.00     Types: Cigarettes    Smokeless tobacco: Never   Substance Use Topics    Alcohol use:  Yes     Alcohol/week: 2.0 standard drinks     Types: 2 Cans of beer per week     Comment: DAILY     No Known Allergies   Current Facility-Administered Medications   Medication Dose Route Frequency    naloxone (NARCAN) injection 0.4 mg  0.4 mg IntraVENous EVERY 2 MINUTES AS NEEDED    albuterol-ipratropium (DUO-NEB) 2.5 MG-0.5 MG/3 ML  3 mL Nebulization QID RT    oxyCODONE IR (ROXICODONE) tablet 5 mg  5 mg Oral Q3H PRN    oxyCODONE IR (ROXICODONE) tablet 10 mg  10 mg Oral Q3H PRN    pantoprazole (PROTONIX) tablet 40 mg  40 mg Oral ACB    dexAMETHasone (DECADRON) tablet 4 mg  4 mg Oral DAILY    acetaminophen (TYLENOL) tablet 650 mg  650 mg Oral TID    senna-docusate (PERICOLACE) 8.6-50 mg per tablet 2 Tablet  2 Tablet Oral DAILY    aspirin delayed-release tablet 81 mg  81 mg Oral 7am    sodium chloride (NS) flush 5-40 mL  5-40 mL IntraVENous Q8H    sodium chloride (NS) flush 5-40 mL  5-40 mL IntraVENous PRN    acetaminophen (TYLENOL) tablet 650 mg  650 mg Oral Q6H PRN    Or    acetaminophen (TYLENOL) suppository 650 mg  650 mg Rectal Q6H PRN    polyethylene glycol (MIRALAX) packet 17 g  17 g Oral DAILY PRN    ondansetron (ZOFRAN ODT) tablet 4 mg  4 mg Oral Q8H PRN    Or    ondansetron (ZOFRAN) injection 4 mg  4 mg IntraVENous Q6H PRN    L.acidophilus-paracasei-S.thermophil-bifidobacter (RISAQUAD) 8 billion cell capsule  1 Capsule Oral DAILY    metoprolol tartrate (LOPRESSOR) tablet 25 mg  25 mg Oral Q12H    enoxaparin (LOVENOX) injection 30 mg  30 mg SubCUTAneous Q24H    lactated Ringers infusion  100 mL/hr IntraVENous CONTINUOUS nitroglycerin (NITROSTAT) tablet 0.4 mg  0.4 mg SubLINGual Q5MIN PRN    morphine injection 2 mg  2 mg IntraVENous Q4H PRN          LAB AND IMAGING FINDINGS:     Lab Results   Component Value Date/Time    WBC 14.5 (H) 08/29/2022 03:07 AM    HGB 9.2 (L) 08/29/2022 03:07 AM    PLATELET 215 (H) 88/49/1250 03:07 AM     Lab Results   Component Value Date/Time    Sodium 131 (L) 08/29/2022 03:07 AM    Potassium 4.4 08/29/2022 03:07 AM    Chloride 99 08/29/2022 03:07 AM    CO2 24 08/29/2022 03:07 AM    BUN 12 08/29/2022 03:07 AM    Creatinine 0.42 (L) 08/29/2022 03:07 AM    Calcium 8.5 08/29/2022 03:07 AM    Magnesium 1.7 08/29/2022 03:07 AM    Phosphorus 3.9 08/29/2022 03:07 AM      Lab Results   Component Value Date/Time    Alk. phosphatase 128 (H) 08/27/2022 04:39 PM    Protein, total 7.7 08/27/2022 04:39 PM    Albumin 2.7 (L) 08/27/2022 04:39 PM    Globulin 5.0 (H) 08/27/2022 04:39 PM     Lab Results   Component Value Date/Time    INR 1.0 04/25/2022 12:51 AM    Prothrombin time 10.1 04/25/2022 12:51 AM    aPTT 35.4 (H) 08/28/2022 05:23 AM      Lab Results   Component Value Date/Time    Iron 34 (L) 08/28/2022 05:23 AM    TIBC 202 (L) 08/28/2022 05:23 AM    Iron % saturation 17 (L) 08/28/2022 05:23 AM    Ferritin 1,008 (H) 08/28/2022 05:29 AM      No results found for: PH, PCO2, PO2  No components found for: GLPOC   No results found for: CPK, CKMB             Total time:   Counseling / coordination time, spent as noted above:   > 50% counseling / coordination?:     Prolonged service was provided for  []30 min   []75 min in face to face time in the presence of the patient, spent as noted above. Time Start:   Time End:   Note: this can only be billed with 96742 (initial) or 69217 (follow up). If multiple start / stop times, list each separately.

## 2022-08-30 NOTE — PROGRESS NOTES
[facility] [facility] Adult  Hospitalist Group                                                                                          Hospitalist Progress Note  Grupo Sterling MD  Answering service: [phone] or [phone] from in house phone        Date of Service:  2022  NAME:  Victorino wSartz  :  1960  MRN:  [id number]      Admission Summary: This 58-year-old man with past medical history significant for peripheral artery disease, status post left femoral/peroneal bypass presented at the emergency room with left shoulder pain. This started about 3 weeks ago. The patient also complained of left-sided chest pain. It is not clear whether the left shoulder pain is as a result of radiation from the left-sided chest pain. The pain is constant sharp pain, 9/10 in severity, worse with breathing. No known relieving factors. The patient also complained of left leg pain. He stated that he has had left leg pain since after his surgery which was in April. The patient also stated that he has been losing weight which was unintentional.  Overall, the patient stated that he feels weak and it is becoming difficult to carry out activity of daily living. Interval history / Subjective:   Pt doing ok today, O2 requirements increased overnight now on 6LNC, despite this he does not complain of worsening SOB.     Awaiting rad onc consult today for L shoulder pain, ongoing pain today     Assessment & Plan:     R lung mass with hilar LAD, and widely metastatic bony disease  - s/p IR guided biopsy of scapula   - Oncology and pulmonary consulted and following  - pain control with oxycodone, and added decadron as well   - Palliative care following   - Likely will need brain MRI also for staging     L Shoulder pain - secondary to bony metastasis likely (in glenoid, scapula)  - IV morphine   - Add PO oxycodone PRN   - Added decadron per palliative  - Rad Onc consult for possible radiation to see today - Will benefit from palliative care     Elevated troponin - flat. Given presence of malignancy and location suspect CP and shoulder pain related to above. - Discussed with Dr. Malachi Ash. DC heparin gtt   - Echo 55-60%     PVD - s/p L fem-perineal bypass   - Contiue ASA    Hyponatremia - mild, monitor   Tobacco abuse - current smoker. Declined nicotine patch     Code status: FULL  Prophylaxis: add lovenox   Care Plan discussed with: pt, RN   Anticipated Disposition: 24-48 hours, pending pain control and DC plan. Suspect will need to go home. PT/OT     Hospital Problems  Date Reviewed: 8/28/2022            Codes Class Noted POA    Severe protein-calorie malnutrition ([facility] ICD-10-CM: G76  ICD-9-CM: 697  8/29/2022 Yes        * (Principal) NSTEMI (non-ST elevated myocardial infarction) [facility]) ICD-10-CM: I21.4  ICD-9-CM: 410.70  8/27/2022 Yes         Review of Systems:   A comprehensive review of systems was negative except for that written in the HPI. Vital Signs:    Last 24hrs VS reviewed since prior progress note. Most recent are:  Visit Vitals  BP 97/60 (BP 1 Location: Right arm, BP Patient Position: Lying)   Pulse 93   Temp 97.7 °F (36.5 °C)   Resp 25   Ht 5' 8\" (1.727 m)   Wt 46.3 kg (102 lb)   SpO2 95%   BMI 15.51 kg/m²         Intake/Output Summary (Last 24 hours) at 8/30/2022 1021  Last data filed at 8/30/2022 0800  Gross per 24 hour   Intake 146.67 ml   Output 2440 ml   Net -2293.33 ml          Physical Examination:     I had a face to face encounter with this patient and independently examined them on 8/30/2022 as outlined below:          Constitutional:  No acute distress, cooperative, pleasant, chronically ill appearing, cachectic    ENT:  Oral mucosa moist, oropharynx benign. Resp:  CTA bilaterally. No wheezing/rhonchi/rales. No accessory muscle use. CV:  Regular rhythm, normal rate, no murmurs, gallops, rubs    GI:  Soft, non distended, non tender.  normoactive bowel sounds, no hepatosplenomegaly Musculoskeletal:  No edema, warm, 2+ pulses throughout    Neurologic:  Moves all extremities. AAOx3, CN II-XII reviewed            Data Review:    Review and/or order of clinical lab test  Review and/or order of tests in the radiology section of CPT  Review and/or order of tests in the medicine section of CPT      Labs:     Recent Labs     08/29/22  0307 08/28/22  0525   WBC 14.5* 14.4*   HGB 9.2* 9.8*   HCT 27.1* 29.4*   * 408*       Recent Labs     08/29/22  0307 08/28/22  0523 08/27/22  1639   *  --  133*   K 4.4  --  4.0   CL 99  --  100   CO2 24  --  26   BUN 12  --  14   CREA 0.42*  --  0.59*   GLU 95  --  99   CA 8.5  --  9.2   MG 1.7 1.9  --    PHOS 3.9  --   --        Recent Labs     08/27/22  1639   ALT 18   *   TBILI 0.4   TP 7.7   ALB 2.7*   GLOB 5.0*       Recent Labs     08/28/22  0523 08/27/22  2345   APTT 35.4* 35.3*        Recent Labs     08/28/22  0529 08/28/22  0523   TIBC  --  202*   PSAT  --  17*   FERR 1,008*  --         Lab Results   Component Value Date/Time    Folate 11.1 08/28/2022 05:23 AM        No results for input(s): PH, PCO2, PO2 in the last 72 hours. No results for input(s): CPK, CKNDX, TROIQ in the last 72 hours.     No lab exists for component: CPKMB  No results found for: CHOL, CHOLX, CHLST, CHOLV, HDL, HDLP, LDL, LDLC, DLDLP, TGLX, TRIGL, TRIGP, CHHD, CHHDX  No results found for: GLUCPOC  No results found for: COLOR, APPRN, SPGRU, REFSG, IRINA, PROTU, GLUCU, KETU, BILU, UROU, ROLANDO, LEUKU, GLUKE, EPSU, BACTU, WBCU, RBCU, CASTS, UCRY      Medications Reviewed:     Current Facility-Administered Medications   Medication Dose Route Frequency    albuterol-ipratropium (DUO-NEB) 2.5 MG-0.5 MG/3 ML  3 mL Nebulization QID RT    oxyCODONE IR (ROXICODONE) tablet 5 mg  5 mg Oral Q3H PRN    oxyCODONE IR (ROXICODONE) tablet 10 mg  10 mg Oral Q3H PRN    pantoprazole (PROTONIX) tablet 40 mg  40 mg Oral ACB    dexAMETHasone (DECADRON) tablet 4 mg  4 mg Oral DAILY    acetaminophen (TYLENOL) tablet 650 mg  650 mg Oral TID    senna-docusate (PERICOLACE) 8.6-50 mg per tablet 2 Tablet  2 Tablet Oral DAILY    aspirin delayed-release tablet 81 mg  81 mg Oral 7am    sodium chloride (NS) flush 5-40 mL  5-40 mL IntraVENous Q8H    sodium chloride (NS) flush 5-40 mL  5-40 mL IntraVENous PRN    acetaminophen (TYLENOL) tablet 650 mg  650 mg Oral Q6H PRN    Or    acetaminophen (TYLENOL) suppository 650 mg  650 mg Rectal Q6H PRN    polyethylene glycol (MIRALAX) packet 17 g  17 g Oral DAILY PRN    ondansetron (ZOFRAN ODT) tablet 4 mg  4 mg Oral Q8H PRN    Or    ondansetron (ZOFRAN) injection 4 mg  4 mg IntraVENous Q6H PRN    L.acidophilus-paracasei-S.thermophil-bifidobacter (RISAQUAD) 8 billion cell capsule  1 Capsule Oral DAILY    metoprolol tartrate (LOPRESSOR) tablet 25 mg  25 mg Oral Q12H    enoxaparin (LOVENOX) injection 30 mg  30 mg SubCUTAneous Q24H    lactated Ringers infusion  100 mL/hr IntraVENous CONTINUOUS    nitroglycerin (NITROSTAT) tablet 0.4 mg  0.4 mg SubLINGual Q5MIN PRN    morphine injection 2 mg  2 mg IntraVENous Q4H PRN     ______________________________________________________________________  EXPECTED LENGTH OF STAY: 3d 16h  ACTUAL LENGTH OF STAY:          3                 Leila Chong MD

## 2022-08-30 NOTE — PROGRESS NOTES
Chart reviewed and spoke with RN in preparation for PT eval. Upon entering room, pt observed sitting  up in bed, eating breakfast, requesting deferral until after meal. Will defer at this time and follow up later in day as able and appropriate. Addendum: Returned later in AM.  Pt not on SpO2 monitoring and donned new pulse ox however room monitor not working. RN aware. Before commencement of PT eval, pt with complaints of dizziness at rest and deemed not appropriate for OOB activity at this time. Will hold eval at this time and follow up as able and appropriate.      Rehananisa Covington PT, DPT

## 2022-08-30 NOTE — PROGRESS NOTES
Problem: Self Care Deficits Care Plan (Adult)  Goal: *Acute Goals and Plan of Care (Insert Text)  Description: Pt will be independence self feeding  Pt will be independence sup<->sit in prep for EOB ADL's  Pt will be independence  LB dressing EOB level  Pt will be independence  sit<-> prep for toilet transfer  Pt will be independence  toilet transfer with LRAD  Pt will be independence  toileting/cloth mgmt LRAD  Pt will be independence  grooming standing sink  Pt will be independence bathing sitting/standing sink LRAD  Pt will be MI colby UE HEP in prep for self care tasks    Outcome: Not Met     OCCUPATIONAL THERAPY EVALUATION  Patient: Raad Current (25 y.o. male)  Date: 8/30/2022  Primary Diagnosis: NSTEMI (non-ST elevated myocardial infarction) [facility]) [I21.4]       Precautions: falls       ASSESSMENT  Patient is a 59 y/o M admitted 8/27/22 w/ hospice consult. He currently presents with generalized weakness, decreased endurance, limited distal reach and overhead reach, L shoulder pain, and limited insight into deficits. Patient is independent w/ RLE dressing, however requires total A for LLE dressing due to L shoulder pain limiting his mobility and gross coordination. Sup>sit  EOB with CGA and BP in sitting 99/64. Patient set up with RW, performed sit>stand with CGA. BP in standing 98/57. Returned to bed due to low BP and reports of mild/moderate dizziness. Educated on calling for assist if needing to mobilize or get OOB. Patient is highly motivated and eager to mobilize more when medically stable. Pt would benefit from skilled OT services while at [facility] in order to increase safety and independence with self care and functional transfers/mobility. Recommend discharge to [address] [place] [address] [place] when medically appropriate. Pending hospice consult. Will continue to assess progress as appropriate.      Other factors to consider for discharge: DME, home set up, family, resources, steps to enter apartment        PLAN :  Recommendations and Planned Interventions: self care training, functional mobility training, therapeutic exercise, balance training, therapeutic activities, endurance activities, patient education, and home safety training    Frequency/Duration: Patient will be followed by occupational therapy 2-3x/week to address goals. Recommendation for discharge: (in order for the patient to meet his/her long term goals)  Home with [address] [place] [address]    This discharge recommendation:  Has been made in collaboration with the attending provider and/or case management    IF patient discharges home will need the following DME: AE: long handled bathing, AE: long handled dressing, bedside commode, portable oxygen, shower chair, and transfer bench       SUBJECTIVE:   Patient stated \"I can definitely walk to the bathroom.     OBJECTIVE DATA SUMMARY:   HISTORY:   Past Medical History:   Diagnosis Date    Arthritis     GERD (gastroesophageal reflux disease)      Past Surgical History:   Procedure Laterality Date    HX APPENDECTOMY      AS CHILD    HX ENDOSCOPY      1990's    HX VASCULAR STENT      X3 STENTS GROIN    HX WISDOM TEETH EXTRACTION      ALL TEETH REMOVED       Expanded or extensive additional review of patient history:     Home Situation  Home Environment: Private residence  # Steps to Enter: 13  Rails to Enter: Yes  Wheelchair Ramp: No  One/Two Story Residence: Other (Comment) (second level apartment)  Lift Chair Available: No  Living Alone: No  Support Systems: Other Family Member(s) (brother; he is on hospice 6L for COPD)  Patient Expects to be Discharged to[de-identified] Home  Current DME Used/Available at Home: Walker, rolling    PLOF: Pt MOD IND for ADLS/IADLS, MOD IND using RW with mobility prior to admission.      Hand dominance: Right    EXAMINATION OF PERFORMANCE DEFICITS:  Cognitive/Behavioral Status:  Neurologic State: Alert  Orientation Level: Oriented to situation;Oriented to place;Oriented to person;Disoriented to time  Cognition: Impulsive  Perception: Appears intact  Perseveration: No perseveration noted  Safety/Judgement: Insight into deficits    Skin: intact    Edema: none noted    Hearing: Auditory  Auditory Impairment: None    Vision/Perceptual:    Tracking: Able to track stimulus in all quadrants w/o difficulty      Range of Motion:  AROM: Generally decreased, functional (limited L shoulder flexion due to pain x4 weeks)  PROM: Generally decreased, functional    Strength:  Strength: Generally decreased, functional    Coordination:  Coordination: Generally decreased, functional    Tone & Sensation:  Tone: Normal  Sensation: Intact    Balance:  Sitting: Intact; Without support  Standing: Intact; Without support    Functional Mobility and Transfers for ADLs:  Bed Mobility:  Supine to Sit: Contact guard assistance  Sit to Supine: Contact guard assistance  Scooting: Stand-by assistance    Transfers:  Sit to Stand: Contact guard assistance  Stand to Sit: Contact guard assistance  Assistive Device : Gait Belt    ADL Assessment:  Feeding: Independent;Setup    Oral Facial Hygiene/Grooming: Independent;Setup    Lower Body Dressing: Maximum assistance    ADL Intervention and task modifications:  Cognitive Retraining  Safety/Judgement: Insight into deficits    [facility] AM-PACTM \"6 Clicks\"                                                       Daily Activity Inpatient Short Form  How much help from another person does the patient currently need. .. Total; A Lot A Little None   1. Putting on and taking off regular lower body clothing? []  1 [x]  2 []  3 []  4   2. Bathing (including washing, rinsing, drying)? []  1 [x]  2 []  3 []  4   3. Toileting, which includes using toilet, bedpan or urinal? [] 1 []  2 [x]  3 []  4   4. Putting on and taking off regular upper body clothing? []  1 []  2 [x]  3 []  4   5. Taking care of personal grooming such as brushing teeth? []  1 []  2 [x]  3 []  4   6. Eating meals?  []  1 []  2 [x] 3 []  4   © , Trustees of [facility] [employer], under license to [employer]. All rights reserved     Score: 16/24     Interpretation of Tool:  Represents clinically-significant functional categories (i.e. Activities of daily living). Percentage of Impairment CH    0%   CI    1-19% CJ    20-39% CK    40-59% CL    60-79% CM    80-99% CN     100%   AMPA  Score 6-24 24 23 20-22 15-19 10-14 7-9 6        Occupational Therapy Evaluation Charge Determination   History Examination Decision-Making   LOW Complexity : Brief history review  LOW Complexity : 1-3 performance deficits relating to physical, cognitive , or psychosocial skils that result in activity limitations and / or participation restrictions  LOW Complexity : No comorbidities that affect functional and no verbal or physical assistance needed to complete eval tasks       Based on the above components, the patient evaluation is determined to be of the following complexity level: LOW   Pain Ratin/10 L shoulder pain    Activity Tolerance:   Good  Please refer to the flowsheet for vital signs taken during this treatment. After treatment patient left in no apparent distress:    Supine in bed and Call bell within reach    COMMUNICATION/EDUCATION:   The patients plan of care was discussed with: Physical therapist and Registered nurse. Patient/family agree to work toward stated goals and plan of care.       Thank you for this referral.  Patel Marx OT  Time Calculation: 17 mins

## 2022-08-30 NOTE — PROGRESS NOTES
0730: Bedside shift change report given to Liza Garcia and Jayna Guallpa, RNs (oncoming nurse) by Rufino Felix RN (offgoing nurse). Report included the following information SBAR, MAR, and Recent Results. Charting and patient care of Victorino Swartz by Shivani Corrigan RN from 0730 to 9721 was supervised and reviewed by this RN.

## 2022-08-30 NOTE — PROGRESS NOTES
Pulmonary, Critical Care, and Sleep Medicine~Progress Note    Name: Yosi Dow MRN: [id number]   : 1960 Hospital: [facility]   Date: 2022 11:42 AM Admission: 2022     Impression Plan   Right lung mass with hilar mass/lymphadenopathy. New since April CXR. Right pleural effusion with pleural masses. Nicotine dependence. NSTEMI  Dyspnea Follow up bx of bone lesion   Right thoracentesis probably tomorrow if ihe is agreeable   Bronchodilators  O2 titration above 90%        Interval History:      S/p CT bx of left scapular lesion. Still declines thoracentesis, but is open to it maybe tomorrow       Says he feels terrible, has dyspnea and left shoulder pain. No new complaints. Pulmonary Consultation:    58year old female with past medical hx as given below who presented to [facility] with increased right shoulder pain. He has unintentional weight loss of 20 pounds in last 2 months. He has cough with thick sputum, he denies fever, chills, night sweats. He noted some occasional right sided chest pain and a lot of fatigue. Known smoker 1.5 ppd x > 20 years now smoking 1/2 ppd. No fhx of malignancy. CT Chest reviewed - Right side pleural effusion. Right anterior sub pleural mass. Right perihilar mass/ LN and pleural nodularity on the right with masses. Small nodules on the left side. Had CXR in April without any signs of mass. I have reviewed the labs and previous days notes. A comprehensive review of systems was negative except for that written in the HPI. Past Medical History:   Diagnosis Date    Arthritis     GERD (gastroesophageal reflux disease)       Past Surgical History:   Procedure Laterality Date    HX APPENDECTOMY      AS CHILD    HX ENDOSCOPY          HX VASCULAR STENT      X3 STENTS GROIN    HX WISDOM TEETH EXTRACTION      ALL TEETH REMOVED      Prior to Admission medications    Medication Sig Start Date End Date Taking? Authorizing Provider   aspirin delayed-release 81 mg tablet Take 81 mg by mouth every morning. Provider, Historical     No Known Allergies   Social History     Tobacco Use    Smoking status: Every Day     Packs/day: 0.50     Years: 40.00     Pack years: 20.00     Types: Cigarettes    Smokeless tobacco: Never   Substance Use Topics    Alcohol use:  Yes     Alcohol/week: 2.0 standard drinks     Types: 2 Cans of beer per week     Comment: DAILY      Family History   Problem Relation Age of Onset    Heart Disease Mother         HEART ATTACK     Emphysema Father     No Known Problems Sister     COPD Brother     Lung Disease Brother     Other Brother         MOTOR VEHICLE ACCIDENT    No Known Problems Daughter     No Known Problems Daughter     No Known Problems Daughter     No Known Problems Son     Anesth Problems Neg Hx      OBJECTIVE:     Vital Signs:     Visit Vitals  BP 97/60 (BP 1 Location: Right arm, BP Patient Position: Lying)   Pulse 93   Temp 97.7 °F (36.5 °C)   Resp 25   Ht 5' 8\" (1.727 m)   Wt 46.3 kg (102 lb)   SpO2 95%   BMI 15.51 kg/m²      Temp (24hrs), Av.8 °F (36.6 °C), Min:97.2 °F (36.2 °C), Max:98.6 °F (37 °C)     Intake/Output:     Last shift:  0701 -  1900  In: -   Out: 190 [Urine:190]    Last 3 shifts:  190 -  0700  In: 2220 [P.O.:460; I.V.:1760]  Out: 3575 [Urine:3575]        Intake/Output Summary (Last 24 hours) at 2022 1019  Last data filed at 2022 0800  Gross per 24 hour   Intake 146.67 ml   Output 2440 ml   Net -2293.33 ml         Physical Exam:                                        Exam Findings Other   General: No resp distress noted, appears stated age    [de-identified]:  No ulcers, JVD not elevated, no cervical LAD    Chest: No pectus deformity, normal chest rise b/l    HEART:  RRR, no murmurs/rubs/gallops    LUNGS: Decreased BS on the right, mild wheeze    ABD: Soft/NT, non rigid mildly distended    EXT: No cyanosis/clubbing/edema, normal peripheral pulses Skin: No rashes or ulcers, no mottling    Neuro: A/O x 3        Medications:  Current Facility-Administered Medications   Medication Dose Route Frequency    albuterol-ipratropium (DUO-NEB) 2.5 MG-0.5 MG/3 ML  3 mL Nebulization QID RT    oxyCODONE IR (ROXICODONE) tablet 5 mg  5 mg Oral Q3H PRN    oxyCODONE IR (ROXICODONE) tablet 10 mg  10 mg Oral Q3H PRN    pantoprazole (PROTONIX) tablet 40 mg  40 mg Oral ACB    dexAMETHasone (DECADRON) tablet 4 mg  4 mg Oral DAILY    acetaminophen (TYLENOL) tablet 650 mg  650 mg Oral TID    senna-docusate (PERICOLACE) 8.6-50 mg per tablet 2 Tablet  2 Tablet Oral DAILY    aspirin delayed-release tablet 81 mg  81 mg Oral 7am    sodium chloride (NS) flush 5-40 mL  5-40 mL IntraVENous Q8H    sodium chloride (NS) flush 5-40 mL  5-40 mL IntraVENous PRN    acetaminophen (TYLENOL) tablet 650 mg  650 mg Oral Q6H PRN    Or    acetaminophen (TYLENOL) suppository 650 mg  650 mg Rectal Q6H PRN    polyethylene glycol (MIRALAX) packet 17 g  17 g Oral DAILY PRN    ondansetron (ZOFRAN ODT) tablet 4 mg  4 mg Oral Q8H PRN    Or    ondansetron (ZOFRAN) injection 4 mg  4 mg IntraVENous Q6H PRN    L.acidophilus-paracasei-S.thermophil-bifidobacter (RISAQUAD) 8 billion cell capsule  1 Capsule Oral DAILY    metoprolol tartrate (LOPRESSOR) tablet 25 mg  25 mg Oral Q12H    enoxaparin (LOVENOX) injection 30 mg  30 mg SubCUTAneous Q24H    lactated Ringers infusion  100 mL/hr IntraVENous CONTINUOUS    nitroglycerin (NITROSTAT) tablet 0.4 mg  0.4 mg SubLINGual Q5MIN PRN    morphine injection 2 mg  2 mg IntraVENous Q4H PRN       Labs:  ABG No results for input(s): PHI, PCO2I, PO2I, HCO3I, SO2I, FIO2I in the last 72 hours.      CBC Recent Labs     08/29/22  0307 08/28/22  0525 08/27/22  1639   WBC 14.5* 14.4* 15.8*   HGB 9.2* 9.8* 10.7*   HCT 27.1* 29.4* 31.1*   * 408* 406*   MCV 96.4 97.0 94.0   MCH 32.7 32.3 19.0          Metabolic  Panel Recent Labs     08/29/22  0307 08/28/22  0523 08/27/22  1639   NA 131*  --  133*   K 4.4  --  4.0   CL 99  --  100   CO2 24  --  26   GLU 95  --  99   BUN 12  --  14   CREA 0.42*  --  0.59*   CA 8.5  --  9.2   MG 1.7 1.9  --    PHOS 3.9  --   --    ALB  --   --  2.7*   ALT  --   --  18          [facility] Labs                Shakeel Barth PA-C  8/30/2022

## 2022-08-30 NOTE — CARDIO/PULMONARY
Cardiac Rehab: Review of chart/notes for Wes Weston based on diagnosis on admit and elevated troponin revealed the following follow up note  from hospitalist on 8/29/2022:      R lung mass with hilar LAD, and widely metastatic bony disease  - s/p IR guided biopsy of scapula 8/29  - Oncology and pulmonary consulted and following  - pain control with oxycodone, and added decadron as well   - Palliative care following   - Likely will need brain MRI also for staging      L Shoulder pain - secondary to bony metastasis likely (in glenoid, scapula)  - IV morphine  - Add PO oxycodone PRN   - Added decadron per palliative  - Rad Onc consult for possible radiation   - Will benefit from palliative care      Elevated troponin - flat. Given presence of malignancy and location suspect CP and shoulder pain related to above. - Discussed with Dr. Jose Velazco. DC heparin gtt  - Echo pending    therefore, at this time he is not a candidate for OP Cardiac Rehab. Madison Oleary RN

## 2022-08-30 NOTE — PROGRESS NOTES
0730 Bedside shift change report given to Mesha Real (oncoming nurse) by Gabe Pendleton (offgoing nurse). Report included the following information SBAR, Kardex, Procedure Summary, Intake/Output, MAR, and Recent Results. 1644 TRANSFER - OUT REPORT:    Verbal report given to Sachin(name) on Milton Spencer  being transferred to (unit) for routine progression of care       Report consisted of patients Situation, Background, Assessment and   Recommendations(SBAR). Information from the following report(s) SBAR, Kardex, ED Summary, Intake/Output, MAR, and Recent Results was reviewed with the receiving nurse. Lines:   Peripheral IV 08/27/22 Right Antecubital (Active)   Site Assessment Other (Comment) 08/30/22 1108   Phlebitis Assessment 0 08/30/22 0800   Infiltration Assessment 0 08/30/22 0800   Dressing Status Clean, dry, & intact 08/30/22 0800   Dressing Type Transparent;Tape 08/30/22 0800   Hub Color/Line Status Pink; Infusing 08/30/22 0800   Action Taken Open ports on tubing capped 08/30/22 0800   Alcohol Cap Used Yes 08/30/22 0800       Peripheral IV 66/62/81 Left Basilic (Active)   Site Assessment Other (Comment) 08/30/22 1108   Phlebitis Assessment 0 08/30/22 0800   Infiltration Assessment 0 08/30/22 0800   Dressing Status Clean, dry, & intact 08/30/22 0800   Dressing Type Transparent;Tape 08/30/22 0800   Hub Color/Line Status Blue;Capped 08/30/22 0800   Action Taken Open ports on tubing capped 08/30/22 0800   Alcohol Cap Used Yes 08/30/22 0800        Opportunity for questions and clarification was provided.       Patient transported with:   [employer]

## 2022-08-31 NOTE — PROGRESS NOTES
Pulmonary, Critical Care, and Sleep Medicine~Progress Note    Name: Milton Spencer MRN: [id number]   : 1960 Hospital: [facility]   Date: 2022 11:42 AM Admission: 2022     Impression Plan   Right lung mass with hilar mass/lymphadenopathy. New since April CXR. Right pleural effusion with pleural masses. Nicotine dependence. NSTEMI  Dyspnea Right thoracentesis today  Pleural fluid labs are in place  Separate LDH and protein, order in place   Bronchodilators  O2 titration above 90%   Discussed with hospitalist     Interval History:      Finally agreeable to thoracentesis  Dyspneic today       S/p CT bx of left scapular lesion. Still declines thoracentesis, but is open to it maybe tomorrow       Says he feels terrible, has dyspnea and left shoulder pain. No new complaints. Pulmonary Consultation:    58year old female with past medical hx as given below who presented to [facility] with increased right shoulder pain. He has unintentional weight loss of 20 pounds in last 2 months. He has cough with thick sputum, he denies fever, chills, night sweats. He noted some occasional right sided chest pain and a lot of fatigue. Known smoker 1.5 ppd x > 20 years now smoking 1/2 ppd. No fhx of malignancy. CT Chest reviewed - Right side pleural effusion. Right anterior sub pleural mass. Right perihilar mass/ LN and pleural nodularity on the right with masses. Small nodules on the left side. Had CXR in April without any signs of mass. I have reviewed the labs and previous days notes. A comprehensive review of systems was negative except for that written in the HPI.   Past Medical History:   Diagnosis Date    Arthritis     GERD (gastroesophageal reflux disease)       Past Surgical History:   Procedure Laterality Date    HX APPENDECTOMY      AS CHILD    HX ENDOSCOPY          HX VASCULAR STENT      X3 STENTS GROIN    HX WISDOM TEETH EXTRACTION      ALL TEETH REMOVED      Prior to Admission medications    Medication Sig Start Date End Date Taking? Authorizing Provider   aspirin delayed-release 81 mg tablet Take 81 mg by mouth every morning. Provider, Historical     No Known Allergies   Social History     Tobacco Use    Smoking status: Every Day     Packs/day: 0.50     Years: 40.00     Pack years: 20.00     Types: Cigarettes    Smokeless tobacco: Never   Substance Use Topics    Alcohol use: Yes     Alcohol/week: 2.0 standard drinks     Types: 2 Cans of beer per week     Comment: DAILY      Family History   Problem Relation Age of Onset    Heart Disease Mother         HEART ATTACK     Emphysema Father     No Known Problems Sister     COPD Brother     Lung Disease Brother     Other Brother         MOTOR VEHICLE ACCIDENT    No Known Problems Daughter     No Known Problems Daughter     No Known Problems Daughter     No Known Problems Son     Anesth Problems Neg Hx      OBJECTIVE:     Vital Signs:     Visit Vitals  /70 (BP 1 Location: Right upper arm, BP Patient Position: At rest;Lying)   Pulse 88   Temp 97.5 °F (36.4 °C)   Resp 16   Ht 5' 8\" (1.727 m)   Wt 46.3 kg (102 lb)   SpO2 90%   BMI 15.51 kg/m²      Temp (24hrs), Av °F (36.7 °C), Min:97.5 °F (36.4 °C), Max:98.7 °F (37.1 °C)     Intake/Output:     Last shift: No intake/output data recorded. Last 3 shifts:  1901 -  0700  In: 3041.7 [P.O.:720;  I.V.:2321.7]  Out:  [Urine:]        Intake/Output Summary (Last 24 hours) at 2022 1058  Last data filed at 2022 1556  Gross per 24 hour   Intake 793.33 ml   Output 450 ml   Net 343.33 ml         Physical Exam:                                        Exam Findings Other   General: No resp distress noted, appears stated age    HEENT:  No ulcers, JVD not elevated, no cervical LAD    Chest: No pectus deformity, normal chest rise b/l    HEART:  RRR, no murmurs/rubs/gallops    LUNGS: Decreased BS on the right, mild wheeze    ABD: Soft/NT, non rigid mildly distended    EXT: No cyanosis/clubbing/edema, normal peripheral pulses    Skin: No rashes or ulcers, no mottling    Neuro: A/O x 3        Medications:  Current Facility-Administered Medications   Medication Dose Route Frequency    naloxone (NARCAN) injection 0.4 mg  0.4 mg IntraVENous EVERY 2 MINUTES AS NEEDED    oxyCODONE ER (OxyCONTIN) tablet 10 mg  10 mg Oral DAILY    albuterol-ipratropium (DUO-NEB) 2.5 MG-0.5 MG/3 ML  3 mL Nebulization QID RT    oxyCODONE IR (ROXICODONE) tablet 5 mg  5 mg Oral Q3H PRN    oxyCODONE IR (ROXICODONE) tablet 10 mg  10 mg Oral Q3H PRN    pantoprazole (PROTONIX) tablet 40 mg  40 mg Oral ACB    dexAMETHasone (DECADRON) tablet 4 mg  4 mg Oral DAILY    acetaminophen (TYLENOL) tablet 650 mg  650 mg Oral TID    senna-docusate (PERICOLACE) 8.6-50 mg per tablet 2 Tablet  2 Tablet Oral DAILY    aspirin delayed-release tablet 81 mg  81 mg Oral 7am    sodium chloride (NS) flush 5-40 mL  5-40 mL IntraVENous Q8H    sodium chloride (NS) flush 5-40 mL  5-40 mL IntraVENous PRN    acetaminophen (TYLENOL) tablet 650 mg  650 mg Oral Q6H PRN    Or    acetaminophen (TYLENOL) suppository 650 mg  650 mg Rectal Q6H PRN    polyethylene glycol (MIRALAX) packet 17 g  17 g Oral DAILY PRN    ondansetron (ZOFRAN ODT) tablet 4 mg  4 mg Oral Q8H PRN    Or    ondansetron (ZOFRAN) injection 4 mg  4 mg IntraVENous Q6H PRN    L.acidophilus-paracasei-S.thermophil-bifidobacter (RISAQUAD) 8 billion cell capsule  1 Capsule Oral DAILY    metoprolol tartrate (LOPRESSOR) tablet 25 mg  25 mg Oral Q12H    enoxaparin (LOVENOX) injection 30 mg  30 mg SubCUTAneous Q24H    lactated Ringers infusion  100 mL/hr IntraVENous CONTINUOUS    nitroglycerin (NITROSTAT) tablet 0.4 mg  0.4 mg SubLINGual Q5MIN PRN    morphine injection 2 mg  2 mg IntraVENous Q4H PRN       Labs:  ABG No results for input(s): PHI, PCO2I, PO2I, HCO3I, SO2I, FIO2I in the last 72 hours.      CBC Recent Labs     08/31/22  0540 08/29/22  0307   WBC 18.2* 14.5*   HGB 8.8* 9.2*   HCT 26.5* 27.1*   * 415*   MCV 96.7 96.4   MCH 32.1 93.0          Metabolic  Panel Recent Labs     08/31/22  0540 08/29/22  0307    131*   K 4.2 4.4    99   CO2 26 24   GLU 98 95   BUN 12 12   CREA 0.48* 0.42*   CA 8.3* 8.5   MG 1.9 1.7   PHOS 3.9 3.9          Pertinent Labs                Shakeel Conte PAHectorC  8/31/2022

## 2022-08-31 NOTE — TELEPHONE ENCOUNTER
Per staff message from NP, May Oliveira, Spoke with Daughter (Jessica Rodriguez), who has confirmed 9/15/22 @ 9am [facility]) w/Dr. Riya Uribe. Location and contact number was provided to the contact.

## 2022-08-31 NOTE — PROGRESS NOTES
Add on Path request faxed/emailed to [facility] Path Dept on specimen [id number] for PDL-1, EGFR, ALK, ROS, BRAF mutations per Provider request.

## 2022-08-31 NOTE — PROGRESS NOTES
Physical Therapy  Chart reviewed, came to see pt however he just returned from radiation and is politely declining therapy at this time, will continue to follow  Ratna Walton PT

## 2022-08-31 NOTE — PROGRESS NOTES
[address] [facility]  Hospitalist Group                                                                                          Hospitalist Progress Note  Helga Wilhelm MD  Answering service: [phone] OR [phone] from in house phone        Date of Service:  2022  NAME:  Nghia Ordonez  :  1960  MRN:  [id number]      Admission Summary: This 28-year-old man with past medical history significant for peripheral artery disease, status post left femoral/peroneal bypass presented at the emergency room with left shoulder pain. This started about 3 weeks ago. The patient also complained of left-sided chest pain. It is not clear whether the left shoulder pain is as a result of radiation from the left-sided chest pain. The pain is constant sharp pain, 9/10 in severity, worse with breathing. No known relieving factors. The patient also complained of left leg pain. He stated that he has had left leg pain since after his surgery which was in April. The patient also stated that he has been losing weight which was unintentional.  Overall, the patient stated that he feels weak and it is becoming difficult to carry out activity of daily living. Interval history / Subjective:   Everything is worse today, breathing is harder, and pain is worse despite radiation. Hopefully will improve. Says he is strongly considering hospice. Reviewed recommendation for thoracentesis, as I do not think we will be able to improve O2 requirements without one. Pt would like to think on this some more, had a friend with a bad experience. Will touch base later today and see if he has made a decision.         Assessment & Plan:     R lung mass with hilar LAD, and widely metastatic bony disease  Acute hypoxic respiratory failure  Pleural effusions - suspect malignant   - s/p IR guided biopsy of scapula   - Oncology and pulmonary consulted and following  - pain control with oxycodone, and added decadron as well   - Palliative care following   - Likely will need brain MRI also for staging but can be done o/p   - Repeat Xray today     L Shoulder pain - secondary to bony metastasis likely (in glenoid, scapula)  - IV morphine   - Add PO oxycodone PRN   - Added decadron per palliative  - Rad Onc initiated palliative radiation to shoulder   - Palliative care following, appreciate assistance     Elevated troponin - flat. Given presence of malignancy and location suspect CP and shoulder pain related to above. - Discussed with Dr. Rupert Haley. DC heparin gtt   - Echo 55-60%     PVD - s/p L fem-perineal bypass   - Contiue ASA    Hyponatremia - mild, monitor   Tobacco abuse - current smoker. Declined nicotine patch     Code status: DNR  Prophylaxis: add lovenox   Care Plan discussed with: pt, RN   Anticipated Disposition: > 48 hours, pending pain control and DC plan. Suspect will need to go home. PT/OT     Hospital Problems  Date Reviewed: 8/28/2022            Codes Class Noted POA    Severe protein-calorie malnutrition ([facility] [address] ICD-10-CM: W00  ICD-9-CM: 247  8/29/2022 Yes        * (Principal) NSTEMI (non-ST elevated myocardial infarction) [facility]) ICD-10-CM: I21.4  ICD-9-CM: 410.70  8/27/2022 Yes         Review of Systems:   A comprehensive review of systems was negative except for that written in the HPI. Vital Signs:    Last 24hrs VS reviewed since prior progress note.  Most recent are:  Visit Vitals  /70 (BP 1 Location: Right upper arm, BP Patient Position: At rest;Lying)   Pulse 88   Temp 97.5 °F (36.4 °C)   Resp 16   Ht 5' 8\" (1.727 m)   Wt 46.3 kg (102 lb)   SpO2 90%   BMI 15.51 kg/m²         Intake/Output Summary (Last 24 hours) at 8/31/2022 0950  Last data filed at 8/30/2022 1556  Gross per 24 hour   Intake 793.33 ml   Output 450 ml   Net 343.33 ml          Physical Examination:     I had a face to face encounter with this patient and independently examined them on 8/31/2022 as outlined below:          Constitutional:  No acute distress, cooperative, pleasant, chronically ill appearing, cachectic    ENT:  Oral mucosa moist, oropharynx benign. Resp:  Course bilaterally, decreased at bases R>L. No wheezing/rhonchi/rales. No accessory muscle use. CV:  Regular rhythm, normal rate, no murmurs, gallops, rubs     GI:  Soft, non distended, non tender. normoactive bowel sounds, no hepatosplenomegaly     Musculoskeletal:  No edema, warm, 2+ pulses throughout     Neurologic:  Moves all extremities. AAOx3, CN II-XII reviewed            Data Review:    Review and/or order of clinical lab test  Review and/or order of tests in the radiology section of CPT  Review and/or order of tests in the medicine section of CPT      Labs:     Recent Labs     08/31/22 0540 08/29/22  0307   WBC 18.2* 14.5*   HGB 8.8* 9.2*   HCT 26.5* 27.1*   * 415*       Recent Labs     08/31/22 0540 08/29/22  0307    131*   K 4.2 4.4    99   CO2 26 24   BUN 12 12   CREA 0.48* 0.42*   GLU 98 95   CA 8.3* 8.5   MG 1.9 1.7   PHOS 3.9 3.9       No results for input(s): ALT, AP, TBIL, TBILI, TP, ALB, GLOB, GGT, AML, LPSE in the last 72 hours. No lab exists for component: SGOT, GPT, AMYP, HLPSE    No results for input(s): INR, PTP, APTT, INREXT, INREXT in the last 72 hours. No results for input(s): FE, TIBC, PSAT, FERR in the last 72 hours. Lab Results   Component Value Date/Time    Folate 11.1 08/28/2022 05:23 AM        No results for input(s): PH, PCO2, PO2 in the last 72 hours. No results for input(s): CPK, CKNDX, TROIQ in the last 72 hours.     No lab exists for component: CPKMB  No results found for: CHOL, CHOLX, CHLST, CHOLV, HDL, HDLP, LDL, LDLC, DLDLP, TGLX, TRIGL, TRIGP, CHHD, CHHDX  No results found for: GLUCPOC  No results found for: COLOR, APPRN, SPGRU, REFSG, IRINA, PROTU, GLUCU, KETU, BILU, UROU, ROLANDO, LEUKU, GLUKE, EPSU, BACTU, WBCU, RBCU, CASTS, UCRY      Medications Reviewed:     Current Facility-Administered Medications Medication Dose Route Frequency    naloxone (NARCAN) injection 0.4 mg  0.4 mg IntraVENous EVERY 2 MINUTES AS NEEDED    oxyCODONE ER (OxyCONTIN) tablet 10 mg  10 mg Oral DAILY    albuterol-ipratropium (DUO-NEB) 2.5 MG-0.5 MG/3 ML  3 mL Nebulization QID RT    oxyCODONE IR (ROXICODONE) tablet 5 mg  5 mg Oral Q3H PRN    oxyCODONE IR (ROXICODONE) tablet 10 mg  10 mg Oral Q3H PRN    pantoprazole (PROTONIX) tablet 40 mg  40 mg Oral ACB    dexAMETHasone (DECADRON) tablet 4 mg  4 mg Oral DAILY    acetaminophen (TYLENOL) tablet 650 mg  650 mg Oral TID    senna-docusate (PERICOLACE) 8.6-50 mg per tablet 2 Tablet  2 Tablet Oral DAILY    aspirin delayed-release tablet 81 mg  81 mg Oral 7am    sodium chloride (NS) flush 5-40 mL  5-40 mL IntraVENous Q8H    sodium chloride (NS) flush 5-40 mL  5-40 mL IntraVENous PRN    acetaminophen (TYLENOL) tablet 650 mg  650 mg Oral Q6H PRN    Or    acetaminophen (TYLENOL) suppository 650 mg  650 mg Rectal Q6H PRN    polyethylene glycol (MIRALAX) packet 17 g  17 g Oral DAILY PRN    ondansetron (ZOFRAN ODT) tablet 4 mg  4 mg Oral Q8H PRN    Or    ondansetron (ZOFRAN) injection 4 mg  4 mg IntraVENous Q6H PRN    L.acidophilus-paracasei-S.thermophil-bifidobacter (RISAQUAD) 8 billion cell capsule  1 Capsule Oral DAILY    metoprolol tartrate (LOPRESSOR) tablet 25 mg  25 mg Oral Q12H    enoxaparin (LOVENOX) injection 30 mg  30 mg SubCUTAneous Q24H    lactated Ringers infusion  100 mL/hr IntraVENous CONTINUOUS    nitroglycerin (NITROSTAT) tablet 0.4 mg  0.4 mg SubLINGual Q5MIN PRN    morphine injection 2 mg  2 mg IntraVENous Q4H PRN     ______________________________________________________________________  EXPECTED LENGTH OF STAY: 3d 16h  ACTUAL LENGTH OF STAY:          4                 Jenny Salazar MD

## 2022-08-31 NOTE — PROGRESS NOTES
Palliative Medicine Consult  Scott: [phone])    Patient Name: Woo English  YOB: 1960    Date of Initial Consult: 8/29/22  Reason for Consult: care decisions- L shoulder and chest pain,due to likely metastatic lung cancer   Requesting Provider: Yanet Chaparro- Oncology    Primary Care Physician: None     SUMMARY:   Woo English is a 58 y.o. with a past history of peripheral artery disease s/p femoral/peroneal bypass 4/2022, CAD who was admitted on 8/27/2022 from home with L shoulder and L chest pain. CT chest done- showing R lung mass and pulmonary nodules, pleural effusion, diffuse bone mets (L scapula, spine, pelvis), peritoneal nodules, adrenal and liver lesions consistent w/ widespread metastatic disease. Oncology has met w/ him to talk about his likely metastatic lung cancer- could be small cell. On CXR 4/2022 there were no acute findings. Pt just underwent bx of scapular mass 8/29. He has had unintentional weight loss of 15lbs although always on the thin side. Has already met with oncology. 8/30- Slept some last night. Was able to go down for CT simulation and work w/ therapy although was dizzy. 8/31- Received radiation 1 of 4 yest, path has returned +adenocarcinoma    Social: Pt is  but still on good terms with ex wife- he has 4 children incl dtr Paty Spicer who is local. Lives w/ his brother Darya Baker who is on hospice for COPD. They live in a 2nd story apartment w/out an elevator and has been getting harder for pt to get up/down stairs. He uses an electric tricycle to get around and go grocery shopping (lives in the fan). Worked as a  for many years, stopped working early this year due to his vascular disease.       PALLIATIVE DIAGNOSES:   Bone pain- nehemias L shoulder and spine pain due to likely malignancy   Weight loss- unintentional   Generalized weakness/debility   Goals of care/advance care planning/code status   Palliative care encounter        PLAN:   Pain: Pain should improve w/ radiation. Continue Dexamethasone 4mg daily until Friday. Oxycodone 5-10mg every 3h prn pain. Oxycontin 10mg bedtime. Bowel regimen. AMD: Pt does not wish to complete AMD today. To get DDNR before discharge. Having difficult time with cancer dx (path returned). Will talk with nursing staff to turn off bed alarm at night. Communicated plan of care with: Palliative IDT, [facility] Team incl Dr Bryon Fisher , [place] care management      GOALS OF CARE / TREATMENT PREFERENCES:     GOALS OF CARE:  Patient/Health Care Proxy Stated Goals: Prolong life    TREATMENT PREFERENCES:   Code Status: DNR    Patient and family's personal goals include to get a dx. Advance Care Planning:  [x] The [facility] Interdisciplinary Team has updated the ACP Navigator with Health Care Decision Maker and Patient Capacity      Primary Decision Maker: Aroldo Contreras - Ren[place] - [phone]    Advance Care Planning 4/25/2022   Confirm Advance Directive None   Patient Would Like to Complete Advance Directive No   Does the patient have other document types (No Data)       Medical Interventions: Limited additional interventions       Other:    As far as possible, the palliative care team has discussed with patient / health care proxy about goals of care / treatment preferences for patient. HISTORY:     History obtained from: chart, staff, patient     CHIEF COMPLAINT: \"I couldn't sleep at all\"    HPI/SUBJECTIVE:    The patient is:   [x] Verbal and participatory  [] Non-participatory due to:     Pt upset that he could not get sleep, bed alarm going off when he moves at all. Feels \"terrible\" today.      Clinical Pain Assessment (nonverbal scale for severity on nonverbal patients):   Clinical Pain Assessment  Severity: 4  Location: L shoulder, spine  Character: aching, deep  Duration: Several weeks  Effect: Movement is harder  Factors: medication helps  Frequency: constant          Duration: for how long has pt been experiencing pain (e.g., 2 days, 1 month, years)  Frequency: how often pain is an issue (e.g., several times per day, once every few days, constant)     FUNCTIONAL ASSESSMENT:     Palliative Performance Scale (PPS):  PPS: 60       PSYCHOSOCIAL/SPIRITUAL SCREENING:     Palliative IDT has assessed this patient for cultural preferences / practices and a referral made as appropriate to needs (Cultural Services, Patient Advocacy, Ethics, etc.)    Any spiritual / Adventist concerns:  [] Yes /  [x] No   If \"Yes\" to discuss with pastoral care during IDT     Does caregiver feel burdened by caring for their loved one:   [] Yes /  [x] No /  [] No Caregiver Present/Available [] No Caregiver [] Pt Lives at [place] [id number]  If \"Yes\" to discuss with social work during IDT    Anticipatory grief assessment:   [x] Normal  / [] Maladaptive     If \"Maladaptive\" to discuss with social work during IDT    ESAS Anxiety: Anxiety: 0    ESAS Depression: Depression: 0        REVIEW OF SYSTEMS:     Positive and pertinent negative findings in ROS are noted above in HPI. The following systems were [x] reviewed / [] unable to be reviewed as noted in HPI  Other findings are noted below. Systems: constitutional, ears/nose/mouth/throat, respiratory, gastrointestinal, genitourinary, musculoskeletal, integumentary, neurologic, psychiatric, endocrine. Positive findings noted below. Modified ESAS Completed by: provider   Fatigue: 6 Drowsiness: 0   Depression: 0 Pain: 4   Anxiety: 0 Nausea: 0   Anorexia: 8 Dyspnea: 1           Stool Occurrence(s): 1        PHYSICAL EXAM:     From RN flowsheet:  Wt Readings from Last 3 Encounters:   08/29/22 102 lb (46.3 kg)   04/28/22 115 lb 8.3 oz (52.4 kg)   04/25/22 115 lb 8.3 oz (52.4 kg)     Blood pressure 118/70, pulse 88, temperature 97.5 °F (36.4 °C), resp. rate 16, height 5' 8\" (1.727 m), weight 102 lb (46.3 kg), SpO2 90 %.     Pain Scale 1: Numeric (0 - 10)  Pain Intensity 1: 7  Pain Onset 1: chronic  Pain Location 1: Shoulder  Pain Orientation 1: Left  Pain Description 1: Constant  Pain Intervention(s) 1: Declines, Distraction  Last bowel movement, if known: 8/30    Constitutional: awake, alert, oriented, no sedation or confusion, very thin w/ generalized muscle wasting   Eyes: pupils equal, anicteric  ENMT: no nasal discharge, moist mucous membranes  Cardiovascular: regular rhythm  Respiratory: breathing not labored, coarse breath sounds, decr at bases  Gastrointestinal: soft non-tender, +bowel sounds  Musculoskeletal: no deformity, no tenderness to palpation  Skin: warm, dry  Neurologic: following commands, moving all extremities  Psychiatric: upset         HISTORY:     Principal Problem:    NSTEMI (non-ST elevated myocardial infarction) ([facility] [address] (8/27/2022)    Active Problems:    [facility] (8/29/2022)    Past Medical History:   Diagnosis Date    Arthritis     GERD (gastroesophageal reflux disease)       Past Surgical History:   Procedure Laterality Date    HX APPENDECTOMY      AS CHILD    HX ENDOSCOPY      1990's    HX VASCULAR STENT      X3 STENTS GROIN    HX WISDOM TEETH EXTRACTION      ALL TEETH REMOVED      Family History   Problem Relation Age of Onset    Heart Disease Mother         HEART ATTACK     Emphysema Father     No Known Problems Sister     COPD Brother     Lung Disease Brother     Other Brother         MOTOR VEHICLE ACCIDENT    No Known Problems Daughter     No Known Problems Daughter     No Known Problems Daughter     No Known Problems Son     Anesth Problems Neg Hx       History reviewed, no pertinent family history. Social History     Tobacco Use    Smoking status: Every Day     Packs/day: 0.50     Years: 40.00     Pack years: 20.00     Types: Cigarettes    Smokeless tobacco: Never   Substance Use Topics    Alcohol use:  Yes     Alcohol/week: 2.0 standard drinks     Types: 2 Cans of beer per week     Comment: DAILY     No Known Allergies   Current Facility-Administered Medications   Medication Dose Route Frequency    naloxone (NARCAN) injection 0.4 mg  0.4 mg IntraVENous EVERY 2 MINUTES AS NEEDED    oxyCODONE ER (OxyCONTIN) tablet 10 mg  10 mg Oral DAILY    albuterol-ipratropium (DUO-NEB) 2.5 MG-0.5 MG/3 ML  3 mL Nebulization QID RT    oxyCODONE IR (ROXICODONE) tablet 5 mg  5 mg Oral Q3H PRN    oxyCODONE IR (ROXICODONE) tablet 10 mg  10 mg Oral Q3H PRN    pantoprazole (PROTONIX) tablet 40 mg  40 mg Oral ACB    dexAMETHasone (DECADRON) tablet 4 mg  4 mg Oral DAILY    acetaminophen (TYLENOL) tablet 650 mg  650 mg Oral TID    senna-docusate (PERICOLACE) 8.6-50 mg per tablet 2 Tablet  2 Tablet Oral DAILY    aspirin delayed-release tablet 81 mg  81 mg Oral 7am    sodium chloride (NS) flush 5-40 mL  5-40 mL IntraVENous Q8H    sodium chloride (NS) flush 5-40 mL  5-40 mL IntraVENous PRN    acetaminophen (TYLENOL) tablet 650 mg  650 mg Oral Q6H PRN    Or    acetaminophen (TYLENOL) suppository 650 mg  650 mg Rectal Q6H PRN    polyethylene glycol (MIRALAX) packet 17 g  17 g Oral DAILY PRN    ondansetron (ZOFRAN ODT) tablet 4 mg  4 mg Oral Q8H PRN    Or    ondansetron (ZOFRAN) injection 4 mg  4 mg IntraVENous Q6H PRN    L.acidophilus-paracasei-S.thermophil-bifidobacter (RISAQUAD) 8 billion cell capsule  1 Capsule Oral DAILY    metoprolol tartrate (LOPRESSOR) tablet 25 mg  25 mg Oral Q12H    enoxaparin (LOVENOX) injection 30 mg  30 mg SubCUTAneous Q24H    lactated Ringers infusion  100 mL/hr IntraVENous CONTINUOUS    nitroglycerin (NITROSTAT) tablet 0.4 mg  0.4 mg SubLINGual Q5MIN PRN    morphine injection 2 mg  2 mg IntraVENous Q4H PRN          LAB AND IMAGING FINDINGS:     Lab Results   Component Value Date/Time    WBC 18.2 (H) 08/31/2022 05:40 AM    HGB 8.8 (L) 08/31/2022 05:40 AM    PLATELET 260 (H) 24/22/0338 05:40 AM     Lab Results   Component Value Date/Time    Sodium 136 08/31/2022 05:40 AM    Potassium 4.2 08/31/2022 05:40 AM    Chloride 102 08/31/2022 05:40 AM CO2 26 08/31/2022 05:40 AM    BUN 12 08/31/2022 05:40 AM    Creatinine 0.48 (L) 08/31/2022 05:40 AM    Calcium 8.3 (L) 08/31/2022 05:40 AM    Magnesium 1.9 08/31/2022 05:40 AM    Phosphorus 3.9 08/31/2022 05:40 AM      Lab Results   Component Value Date/Time    Alk. phosphatase 128 (H) 08/27/2022 04:39 PM    Protein, total 7.7 08/27/2022 04:39 PM    Albumin 2.7 (L) 08/27/2022 04:39 PM    Globulin 5.0 (H) 08/27/2022 04:39 PM     Lab Results   Component Value Date/Time    INR 1.0 04/25/2022 12:51 AM    Prothrombin time 10.1 04/25/2022 12:51 AM    aPTT 35.4 (H) 08/28/2022 05:23 AM      Lab Results   Component Value Date/Time    Iron 34 (L) 08/28/2022 05:23 AM    TIBC 202 (L) 08/28/2022 05:23 AM    Iron % saturation 17 (L) 08/28/2022 05:23 AM    Ferritin 1,008 (H) 08/28/2022 05:29 AM      No results found for: PH, PCO2, PO2  No components found for: GLPOC   No results found for: CPK, CKMB             Total time:   Counseling / coordination time, spent as noted above:   > 50% counseling / coordination?:     Prolonged service was provided for  []30 min   []75 min in face to face time in the presence of the patient, spent as noted above. Time Start:   Time End:   Note: this can only be billed with 46742 (initial) or 88255 (follow up). If multiple start / stop times, list each separately.

## 2022-08-31 NOTE — PROGRESS NOTES
Occupational Therapy Note:    Chart reviewed, spoke to pt's nurse. Attempted to see pt for OT treatment. Pt received in bed, reported he had just returned from radiation and politely declining therapy at that time. Will defer and continue to follow. Thank you.     Dearashkan Lee OTR/ASHKAN

## 2022-08-31 NOTE — PROGRESS NOTES
Problem: Pressure Injury - Risk of  Goal: *Prevention of pressure injury  Description: Document Vipul Scale and appropriate interventions in the flowsheet. Outcome: Progressing Towards Goal  Note: Pressure Injury Interventions:  Sensory Interventions: Assess changes in LOC         Activity Interventions: Assess need for specialty bed    Mobility Interventions: Assess need for specialty bed    Nutrition Interventions: Document food/fluid/supplement intake    Friction and Shear Interventions: Minimize layers                Problem: Patient Education: Go to Patient Education Activity  Goal: Patient/Family Education  Outcome: Progressing Towards Goal     Problem: Falls - Risk of  Goal: *Absence of Falls  Description: Document Green Tobyia Fall Risk and appropriate interventions in the flowsheet.   Outcome: Progressing Towards Goal  Note: Fall Risk Interventions:  Mobility Interventions: Bed/chair exit alarm         Medication Interventions: Assess postural VS orthostatic hypotension    Elimination Interventions: Call light in reach

## 2022-08-31 NOTE — PROGRESS NOTES
Patient received radiation treatment number 2 of 4 to the LT shoulder, LT pelvis, and RT iliac today.   A Zhong  RT(T)

## 2022-09-01 NOTE — PROGRESS NOTES
[address] [facility] Adult  Hospitalist Group                                                                                          Hospitalist Progress Note  Rd Hernández MD  Answering service: [phone] from in house phone        Date of Service:  2022  NAME:  Gold Sutherland  :  1960  MRN:  [id number]      Admission Summary: This 55-year-old man with past medical history significant for peripheral artery disease, status post left femoral/peroneal bypass presented at the emergency room with left shoulder pain. This started about 3 weeks ago. The patient also complained of left-sided chest pain. It is not clear whether the left shoulder pain is as a result of radiation from the left-sided chest pain. The pain is constant sharp pain, 9/10 in severity, worse with breathing. No known relieving factors. The patient also complained of left leg pain. He stated that he has had left leg pain since after his surgery which was in April. The patient also stated that he has been losing weight which was unintentional.  Overall, the patient stated that he feels weak and it is becoming difficult to carry out activity of daily living.        Interval history / Subjective:   Status post RRT this morning for chest tightness and pain  Patient looks very ill and tired getting radiation day 3  Seen by oncology as well EKG and cardiac enzymes reviewed       Assessment & Plan:     R lung mass with hilar LAD, and widely metastatic bony disease  Acute hypoxic respiratory failure  Pleural effusions - suspect malignant   - s/p IR guided biopsy of scapula   - Oncology and pulmonary consulted and following  - pain control with oxycodone, and added decadron as well   - Palliative care following   - Likely will need brain MRI also for staging     L Shoulder pain - secondary to bony metastasis likely (in glenoid, scapula)  - IV morphine   - Add PO oxycodone PRN   - Added decadron per palliative  - Rad Onc initiated palliative radiation to shoulder   - Palliative care following, appreciate assistance     Elevated troponin -   --Status post RRT troponin trending down  Given presence of malignancy and location suspect CP and shoulder pain related to above. - Discussed with  DC heparin gtt   - Echo 55-60%     PVD - s/p L fem-perineal bypass   - Contiue ASA    Hyponatremia - mild, monitor   Tobacco abuse - current smoker. Declined nicotine patch     Code status: DNR  Prophylaxis: add lovenox   Care Plan discussed with: pt, RN   Anticipated Disposition: > 48 hours, pending pain control and DC plan. Suspect will need to go home. PT/OT     Hospital Problems  Date Reviewed: 8/28/2022            Codes Class Noted POA    Severe protein-calorie malnutrition ([facility] [address] ICD-10-CM: Z50  ICD-9-CM: 029  8/29/2022 Yes        * (Principal) NSTEMI (non-ST elevated myocardial infarction) [facility]) ICD-10-CM: I21.4  ICD-9-CM: 410.70  8/27/2022 Yes       Review of Systems:   A comprehensive review of systems was negative except for that written in the HPI. Vital Signs:    Last 24hrs VS reviewed since prior progress note. Most recent are:  Visit Vitals  /73 (BP 1 Location: Left upper arm, BP Patient Position: At rest;Lying)   Pulse 82   Temp 97.7 °F (36.5 °C)   Resp 18   Ht 5' 8\" (1.727 m)   Wt 46.3 kg (102 lb)   SpO2 90%   BMI 15.51 kg/m²         Intake/Output Summary (Last 24 hours) at 9/1/2022 1520  Last data filed at 9/1/2022 4970  Gross per 24 hour   Intake --   Output 300 ml   Net -300 ml          Physical Examination:     I had a face to face encounter with this patient and independently examined them on 9/1/2022 as outlined below:          Constitutional:  No acute distress, cooperative, pleasant, chronically ill appearing, cachectic    ENT:  Oral mucosa moist, oropharynx benign. Resp:  Course bilaterally, decreased at bases R>L. No wheezing/rhonchi/rales. No accessory muscle use.     CV:  Regular rhythm, normal rate, no murmurs, gallops, rubs     GI:  Soft, non distended, non tender. normoactive bowel sounds, no hepatosplenomegaly     Musculoskeletal:  No edema, warm, 2+ pulses throughout     Neurologic:  Moves all extremities. AAOx3, CN II-XII reviewed            Data Review:    Review and/or order of clinical lab test  Review and/or order of tests in the radiology section of CPT  Review and/or order of tests in the medicine section of CPT      Labs:     Recent Labs     09/01/22 0422 08/31/22  0540   WBC 19.0* 18.2*   HGB 9.4* 8.8*   HCT 28.1* 26.5*   * 469*       Recent Labs     09/01/22 0422 08/31/22  0540   * 136   K 4.5 4.2   CL 98 102   CO2 27 26   BUN 12 12   CREA 0.58* 0.48*   * 98   CA 8.7 8.3*   MG 1.9 1.9   PHOS 4.3 3.9       Recent Labs     09/01/22  0853   TP 6.4       No results for input(s): INR, PTP, APTT, INREXT, INREXT in the last 72 hours. No results for input(s): FE, TIBC, PSAT, FERR in the last 72 hours. Lab Results   Component Value Date/Time    Folate 11.1 08/28/2022 05:23 AM        No results for input(s): PH, PCO2, PO2 in the last 72 hours. No results for input(s): CPK, CKNDX, TROIQ in the last 72 hours.     No lab exists for component: CPKMB  No results found for: CHOL, CHOLX, CHLST, CHOLV, HDL, HDLP, LDL, LDLC, DLDLP, TGLX, TRIGL, TRIGP, CHHD, CHHDX  No results found for: GLUCPOC  No results found for: COLOR, APPRN, SPGRU, REFSG, IRINA, PROTU, GLUCU, KETU, BILU, UROU, ROLANDO, LEUKU, GLUKE, EPSU, BACTU, WBCU, RBCU, CASTS, UCRY      Medications Reviewed:     Current Facility-Administered Medications   Medication Dose Route Frequency    naloxone (NARCAN) injection 0.4 mg  0.4 mg IntraVENous EVERY 2 MINUTES AS NEEDED    oxyCODONE ER (OxyCONTIN) tablet 10 mg  10 mg Oral DAILY    albuterol-ipratropium (DUO-NEB) 2.5 MG-0.5 MG/3 ML  3 mL Nebulization QID RT    oxyCODONE IR (ROXICODONE) tablet 5 mg  5 mg Oral Q3H PRN    oxyCODONE IR (ROXICODONE) tablet 10 mg  10 mg Oral Q3H PRN pantoprazole (PROTONIX) tablet 40 mg  40 mg Oral ACB    dexAMETHasone (DECADRON) tablet 4 mg  4 mg Oral DAILY    acetaminophen (TYLENOL) tablet 650 mg  650 mg Oral TID    senna-docusate (PERICOLACE) 8.6-50 mg per tablet 2 Tablet  2 Tablet Oral DAILY    aspirin delayed-release tablet 81 mg  81 mg Oral 7am    sodium chloride (NS) flush 5-40 mL  5-40 mL IntraVENous Q8H    sodium chloride (NS) flush 5-40 mL  5-40 mL IntraVENous PRN    acetaminophen (TYLENOL) tablet 650 mg  650 mg Oral Q6H PRN    Or    acetaminophen (TYLENOL) suppository 650 mg  650 mg Rectal Q6H PRN    polyethylene glycol (MIRALAX) packet 17 g  17 g Oral DAILY PRN    ondansetron (ZOFRAN ODT) tablet 4 mg  4 mg Oral Q8H PRN    Or    ondansetron (ZOFRAN) injection 4 mg  4 mg IntraVENous Q6H PRN    L.acidophilus-paracasei-S.thermophil-bifidobacter (RISAQUAD) 8 billion cell capsule  1 Capsule Oral DAILY    metoprolol tartrate (LOPRESSOR) tablet 25 mg  25 mg Oral Q12H    enoxaparin (LOVENOX) injection 30 mg  30 mg SubCUTAneous Q24H    lactated Ringers infusion  100 mL/hr IntraVENous CONTINUOUS    nitroglycerin (NITROSTAT) tablet 0.4 mg  0.4 mg SubLINGual Q5MIN PRN    morphine injection 2 mg  2 mg IntraVENous Q4H PRN     ______________________________________________________________________  EXPECTED LENGTH OF STAY: 3d 16h  ACTUAL LENGTH OF STAY:          5                 Dominick Mcnair MD

## 2022-09-01 NOTE — PROGRESS NOTES
+                                                                                                                                                                                     Critical Care Documentation    Name: Jesus Dominguez  YOB: 1960  MRN: [id number]  Admission Date: 8/27/2022  6:17 PM    Date of service: 9/1/2022    Active Diagnoses:    Hospital Problems  Date Reviewed: 8/28/2022            Codes Class Noted POA    Se[facility]cal[facility] ICD-10-CM: Q63  ICD-9-CM: 296  8/29/2022 Yes        * (Principal) NSTEMI (non-ST elevated myocardial infarction) [facility]) ICD-10-CM: I21.4  ICD-9-CM: 410.70  8/27/2022 Yes           Chief Complaint: This 70-year-old man with past medical history significant for peripheral artery disease, status post left femoral/peroneal bypass presented at the emergency room with left shoulder pain. This started about 3 weeks ago. The patient also complained of left-sided chest pain. It is not clear whether the left shoulder pain is as a result of radiation from the left-sided chest pain. The pain is constant sharp pain, 9/10 in severity, worse with breathing. No known relieving factors. The patient also complained of left leg pain. He stated that he has had left leg pain since after his surgery which was in April. The patient also stated that he has been losing weight which was unintentional.  Overall, the patient stated that he feels weak and it is becoming difficult to carry out activity of daily living.       Clinical Presentation: A RRT was called due to patient complaining of chest tightness and feeling of pressure      Physical Exam:   General ill-appearing not in acute distress  HEENT normocephalic atraumatic  Chest clear to auscultation bilateral  CVS S1-S2 normal regular  Abdomen soft bowel sounds positive  Neurological alert oriented    Data Reviewed:   EKG normal sinus rhythm  Labs reviewed troponin 88 which was trending down from previous levels LD 2005      Assessment and Plan:    Chest pain in the setting of getting radiation for adenocarcinoma of lung  --We will add PPI before meals  --Initial EKG normal sinus rhythm  --Continue to trend troponin if chest pain continues  --Patient has right-sided pleural effusion requested IR to drain the fluid today      Medications Administered:   Sedation: [ ] yes [ Donis Parkman no   Anxiolytics: [ ] yes Deep.Dears ] no   Antiarrhythmics: [ ] yes [ Donis Parkman no   Antihypertensives: [ ] yes Deep.Dears ] no   Pressors: [ ] yes [ Donis Parkman no   IVF's: [ ] yes [ Donis Parkman no       Critical Care Attestation: This patient is unstable and critically ill. Due to a high probability of clinically significant, life threatening deterioration, the patient required my highest level of preparedness to intervene emergently and I personally spent this critical care time directly and personally managing the patient. This critical care time included obtaining a history; examining the patient; pulse oximetry; ordering and review of studies; arranging urgent treatment with development of a management plan; evaluation of patient's response to treatment; frequent reassessment; and, discussions with other providers and/or family. This critical care time was performed to assess and manage the high probability of imminent, life-threatening deterioration that could result in multi-organ failure and death. It was exclusive of separately billable procedures, treating other patients, and teaching time. Time Spent:     I personally spent 30 minutes in providing critical care time.     Ayaan Nolan MD  9/1/2022  3:16 PM

## 2022-09-01 NOTE — PROGRESS NOTES
Pulmonary, Critical Care, and Sleep Medicine~Progress Note    Name: Chris De Leon MRN: [id number]   : 1960 Hospital: [facility]   Date: 2022 11:42 AM Admission: 2022     Impression Plan   Right lung mass with hilar mass/lymphadenopathy. New since April CXR. Right pleural effusion with pleural masses. Nicotine dependence. NSTEMI  Dyspnea Right thoracentesis today  Pleural fluid labs are in place  Separate LDH and protein, order in place. Were not done so we will do an add on   Bronchodilators  O2 titration above 90%   Discussed with hospitalist yesterday      Interval History:      Had RRT this am for chest pains   Stable right now       Finally agreeable to thoracentesis  Dyspneic today       S/p CT bx of left scapular lesion. Still declines thoracentesis, but is open to it maybe tomorrow       Says he feels terrible, has dyspnea and left shoulder pain. No new complaints. Pulmonary Consultation:    58year old female with past medical hx as given below who presented to [facility] with increased right shoulder pain. He has unintentional weight loss of 20 pounds in last 2 months. He has cough with thick sputum, he denies fever, chills, night sweats. He noted some occasional right sided chest pain and a lot of fatigue. Known smoker 1.5 ppd x > 20 years now smoking 1/2 ppd. No fhx of malignancy. CT Chest reviewed - Right side pleural effusion. Right anterior sub pleural mass. Right perihilar mass/ LN and pleural nodularity on the right with masses. Small nodules on the left side. Had CXR in April without any signs of mass. I have reviewed the labs and previous days notes. A comprehensive review of systems was negative except for that written in the HPI.   Past Medical History:   Diagnosis Date    Arthritis     GERD (gastroesophageal reflux disease)       Past Surgical History:   Procedure Laterality Date    HX APPENDECTOMY      AS CHILD HX ENDOSCOPY          HX VASCULAR STENT      X3 STENTS GROIN    HX WISDOM TEETH EXTRACTION      ALL TEETH REMOVED      Prior to Admission medications    Medication Sig Start Date End Date Taking? Authorizing Provider   aspirin delayed-release 81 mg tablet Take 81 mg by mouth every morning. Provider, Historical     No Known Allergies   Social History     Tobacco Use    Smoking status: Every Day     Packs/day: 0.50     Years: 40.00     Pack years: 20.00     Types: Cigarettes    Smokeless tobacco: Never   Substance Use Topics    Alcohol use:  Yes     Alcohol/week: 2.0 standard drinks     Types: 2 Cans of beer per week     Comment: DAILY      Family History   Problem Relation Age of Onset    Heart Disease Mother         HEART ATTACK     Emphysema Father     No Known Problems Sister     COPD Brother     Lung Disease Brother     Other Brother         MOTOR VEHICLE ACCIDENT    No Known Problems Daughter     No Known Problems Daughter     No Known Problems Daughter     No Known Problems Son     Anesth Problems Neg Hx      OBJECTIVE:     Vital Signs:     Visit Vitals  /65 (BP 1 Location: Left upper arm, BP Patient Position: At rest)   Pulse 79   Temp 97.7 °F (36.5 °C)   Resp 18   Ht 5' 8\" (1.727 m)   Wt 46.3 kg (102 lb)   SpO2 95%   BMI 15.51 kg/m²      Temp (24hrs), Av.7 °F (36.5 °C), Min:97.6 °F (36.4 °C), Max:97.9 °F (36.6 °C)     Intake/Output:     Last shift: 701 - 1900  In: -   Out: 300 [Urine:300]    Last 3 shifts: 1901 -  07  In: -   Out: 275 [Urine:275]        Intake/Output Summary (Last 24 hours) at 2022 1005  Last data filed at 2022 0711  Gross per 24 hour   Intake --   Output 575 ml   Net -575 ml         Physical Exam:                                        Exam Findings Other   General: No resp distress noted, appears stated age    HEENT:  No ulcers, JVD not elevated, no cervical LAD    Chest: No pectus deformity, normal chest rise b/l    HEART:  RRR, no murmurs/rubs/gallops    LUNGS: Decreased BS on the right, mild wheeze    ABD: Soft/NT, non rigid mildly distended    EXT: No cyanosis/clubbing/edema, normal peripheral pulses    Skin: No rashes or ulcers, no mottling    Neuro: A/O x 3        Medications:  Current Facility-Administered Medications   Medication Dose Route Frequency    naloxone (NARCAN) injection 0.4 mg  0.4 mg IntraVENous EVERY 2 MINUTES AS NEEDED    oxyCODONE ER (OxyCONTIN) tablet 10 mg  10 mg Oral DAILY    albuterol-ipratropium (DUO-NEB) 2.5 MG-0.5 MG/3 ML  3 mL Nebulization QID RT    oxyCODONE IR (ROXICODONE) tablet 5 mg  5 mg Oral Q3H PRN    oxyCODONE IR (ROXICODONE) tablet 10 mg  10 mg Oral Q3H PRN    pantoprazole (PROTONIX) tablet 40 mg  40 mg Oral ACB    dexAMETHasone (DECADRON) tablet 4 mg  4 mg Oral DAILY    acetaminophen (TYLENOL) tablet 650 mg  650 mg Oral TID    senna-docusate (PERICOLACE) 8.6-50 mg per tablet 2 Tablet  2 Tablet Oral DAILY    aspirin delayed-release tablet 81 mg  81 mg Oral 7am    sodium chloride (NS) flush 5-40 mL  5-40 mL IntraVENous Q8H    sodium chloride (NS) flush 5-40 mL  5-40 mL IntraVENous PRN    acetaminophen (TYLENOL) tablet 650 mg  650 mg Oral Q6H PRN    Or    acetaminophen (TYLENOL) suppository 650 mg  650 mg Rectal Q6H PRN    polyethylene glycol (MIRALAX) packet 17 g  17 g Oral DAILY PRN    ondansetron (ZOFRAN ODT) tablet 4 mg  4 mg Oral Q8H PRN    Or    ondansetron (ZOFRAN) injection 4 mg  4 mg IntraVENous Q6H PRN    L.acidophilus-paracasei-S.thermophil-bifidobacter (RISAQUAD) 8 billion cell capsule  1 Capsule Oral DAILY    metoprolol tartrate (LOPRESSOR) tablet 25 mg  25 mg Oral Q12H    enoxaparin (LOVENOX) injection 30 mg  30 mg SubCUTAneous Q24H    lactated Ringers infusion  100 mL/hr IntraVENous CONTINUOUS    nitroglycerin (NITROSTAT) tablet 0.4 mg  0.4 mg SubLINGual Q5MIN PRN    morphine injection 2 mg  2 mg IntraVENous Q4H PRN       Labs:  ABG No results for input(s): PHI, PCO2I, PO2I, HCO3I, SO2I, FIO2I in the last 72 hours.      CBC Recent Labs     09/01/22  0422 08/31/22  0540   WBC 19.0* 18.2*   HGB 9.4* 8.8*   HCT 28.1* 26.5*   * 469*   MCV 95.9 96.7   MCH 32.1 61.9          Metabolic  Panel Recent Labs     09/01/22  0422 08/31/22  0540   * 136   K 4.5 4.2   CL 98 102   CO2 27 26   * 98   BUN 12 12   CREA 0.58* 0.48*   CA 8.7 8.3*   MG 1.9 1.9   PHOS 4.3 3.9          Pertinent Labs                Shakeel Baig PA-C  9/1/2022

## 2022-09-01 NOTE — PROGRESS NOTES
Problem: Pressure Injury - Risk of  Goal: *Prevention of pressure injury  Description: Document Vipul Scale and appropriate interventions in the flowsheet. Outcome: Progressing Towards Goal  Note: Pressure Injury Interventions:  Sensory Interventions: Float heels         Activity Interventions: Assess need for specialty bed    Mobility Interventions: Assess need for specialty bed    Nutrition Interventions: Document food/fluid/supplement intake    Friction and Shear Interventions: Minimize layers                Problem: Patient Education: Go to Patient Education Activity  Goal: Patient/Family Education  Outcome: Progressing Towards Goal     Problem: Falls - Risk of  Goal: *Absence of Falls  Description: Document Clydene Bone Fall Risk and appropriate interventions in the flowsheet.   Outcome: Progressing Towards Goal  Note: Fall Risk Interventions:  Mobility Interventions: Bed/chair exit alarm         Medication Interventions: Bed/chair exit alarm    Elimination Interventions: Call light in reach

## 2022-09-01 NOTE — PROGRESS NOTES
Physical Therapy Note    Pt with RR this AM due to chest pain. Patient with hx of lung CA currently recieving radiation and for paracentesis today. Will defer for medical stability.     Arlette CALDWELLT

## 2022-09-01 NOTE — PROGRESS NOTES
[employer]  Medical Oncology at [facility]      Hematology / Oncology Progress Note    Reason for Visit:   Siddharth De Dios is a 58 y.o. male who is seen in hospital follow up for presumed metastatic lung cancer. History of Present Illness:   Siddharth De Dios is a 58 y.o. male seen for follow up lung mass with PAD who presented with left shoulder and chest pain, found to have lung mass. Pt states he has had left shoulder pain and chest pain for approximately 2-3 weeks. He reports losing 15 lbs in the past month, but states he has always been thin - reports baseline weight 115-120. He underwent femoral/peroneal bypass in April 2022. States he has been walking with a walker and thinks this caused his shoulder pain. Smokes < 1ppd for past 45 years. Lives with his brother who is on hospice for COPD. Interval History: seen today for fu. In bed doing ok. Getting XRT. Pain controlled. SOB on 02. A complete review of systems was obtained, negative except as described above       Past Medical History:   Diagnosis Date    Arthritis     GERD (gastroesophageal reflux disease)       Past Surgical History:   Procedure Laterality Date    HX APPENDECTOMY      AS CHILD    HX ENDOSCOPY      1990's    HX VASCULAR STENT      X3 STENTS GROIN    HX WISDOM TEETH EXTRACTION      ALL TEETH REMOVED      Social History     Tobacco Use    Smoking status: Every Day     Packs/day: 0.50     Years: 40.00     Pack years: 20.00     Types: Cigarettes    Smokeless tobacco: Never   Substance Use Topics    Alcohol use:  Yes     Alcohol/week: 2.0 standard drinks     Types: 2 Cans of beer per week     Comment: DAILY      Family History   Problem Relation Age of Onset    Heart Disease Mother         HEART ATTACK     Emphysema Father     No Known Problems Sister     COPD Brother     Lung Disease Brother     Other Brother         MOTOR VEHICLE ACCIDENT    No Known Problems Daughter     No Known Problems Daughter     No Known Problems Daughter     No Known Problems Son     Anesth Problems Neg Hx      Current Facility-Administered Medications   Medication Dose Route Frequency    naloxone (NARCAN) injection 0.4 mg  0.4 mg IntraVENous EVERY 2 MINUTES AS NEEDED    oxyCODONE ER (OxyCONTIN) tablet 10 mg  10 mg Oral DAILY    albuterol-ipratropium (DUO-NEB) 2.5 MG-0.5 MG/3 ML  3 mL Nebulization QID RT    oxyCODONE IR (ROXICODONE) tablet 5 mg  5 mg Oral Q3H PRN    oxyCODONE IR (ROXICODONE) tablet 10 mg  10 mg Oral Q3H PRN    pantoprazole (PROTONIX) tablet 40 mg  40 mg Oral ACB    dexAMETHasone (DECADRON) tablet 4 mg  4 mg Oral DAILY    acetaminophen (TYLENOL) tablet 650 mg  650 mg Oral TID    senna-docusate (PERICOLACE) 8.6-50 mg per tablet 2 Tablet  2 Tablet Oral DAILY    aspirin delayed-release tablet 81 mg  81 mg Oral 7am    sodium chloride (NS) flush 5-40 mL  5-40 mL IntraVENous Q8H    sodium chloride (NS) flush 5-40 mL  5-40 mL IntraVENous PRN    acetaminophen (TYLENOL) tablet 650 mg  650 mg Oral Q6H PRN    Or    acetaminophen (TYLENOL) suppository 650 mg  650 mg Rectal Q6H PRN    polyethylene glycol (MIRALAX) packet 17 g  17 g Oral DAILY PRN    ondansetron (ZOFRAN ODT) tablet 4 mg  4 mg Oral Q8H PRN    Or    ondansetron (ZOFRAN) injection 4 mg  4 mg IntraVENous Q6H PRN    L.acidophilus-paracasei-S.thermophil-bifidobacter (RISAQUAD) 8 billion cell capsule  1 Capsule Oral DAILY    metoprolol tartrate (LOPRESSOR) tablet 25 mg  25 mg Oral Q12H    enoxaparin (LOVENOX) injection 30 mg  30 mg SubCUTAneous Q24H    lactated Ringers infusion  100 mL/hr IntraVENous CONTINUOUS    nitroglycerin (NITROSTAT) tablet 0.4 mg  0.4 mg SubLINGual Q5MIN PRN    morphine injection 2 mg  2 mg IntraVENous Q4H PRN      No Known Allergies     Review of Systems: A complete review of systems was obtained, negative except as described above.     Physical Exam:   Visit Vitals  /65 (BP 1 Location: Left upper arm, BP Patient Position: At rest)   Pulse 79   Temp 97.7 °F (36.5 °C)   Resp 18   Ht 5' 8\" (1.727 m)   Wt 102 lb (46.3 kg)   SpO2 95%   BMI 15.51 kg/m²     ECOG PS: 2-3  General: No distress, cachectic  Eyes: anicteric sclerae  HENT: Atraumatic   Neck: Supple  Respiratory: Normal respiratory effort. On 02  MS: states can walk a few feet. Has muscle wasting  Skin: No rashes, ecchymoses, or petechiae. Normal temperature, turgor, and texture. Neuro/Psych: Moves all 4 extremities. Alert, oriented, appropriate affect, normal judgment/insight      Results:     Lab Results   Component Value Date/Time    WBC 19.0 (H) 2022 04:22 AM    HGB 9.4 (L) 2022 04:22 AM    HCT 28.1 (L) 2022 04:22 AM    PLATELET 023 (H)  04:22 AM    MCV 95.9 2022 04:22 AM    ABS. NEUTROPHILS 16.0 (H) 2022 04:22 AM     Lab Results   Component Value Date/Time    Sodium 132 (L) 2022 04:22 AM    Potassium 4.5 2022 04:22 AM    Chloride 98 2022 04:22 AM    CO2 27 2022 04:22 AM    Glucose 120 (H) 2022 04:22 AM    BUN 12 2022 04:22 AM    Creatinine 0.58 (L) 2022 04:22 AM    GFR est AA >60 2022 04:22 AM    GFR est non-AA >60 2022 04:22 AM    Calcium 8.7 2022 04:22 AM     Lab Results   Component Value Date/Time    Bilirubin, total 0.4 2022 04:39 PM    ALT (SGPT) 18 2022 04:39 PM    Alk.  phosphatase 128 (H) 2022 04:39 PM    Protein, total 6.4 2022 08:53 AM    Albumin 2.7 (L) 2022 04:39 PM    Globulin 5.0 (H) 2022 04:39 PM     Lab Results   Component Value Date/Time    Iron 34 (L) 2022 05:23 AM    TIBC 202 (L) 2022 05:23 AM    Iron % saturation 17 (L) 2022 05:23 AM    Ferritin 1,008 (H) 2022 05:29 AM     Lab Results   Component Value Date/Time    Vitamin B12 1,525 (H) 2022 05:23 AM    Folate 11.1 2022 05:23 AM     Lab Results   Component Value Date/Time    TSH 1.59 2022 05:23 AM       Imagin/27/22 CTA chest, abd/pelvis:  FINDINGS:  THORAX: There is a right perihilar lung mass measuring 4.9 x 2.6 cm, with  numerous nodules along delayed right pleural surface, enlarged mediastinal lymph  nodes, and a moderate right pleural effusion. There is left supraclavicular  prepectoral lymphadenopathy. Multiple bilateral parenchymal lung nodules  consistent with metastatic disease. Significant atelectasis in the right lower  lobe. Heart size is normal. No pericardial effusion. No pneumothorax. LIVER: Subcapsular lesions on the surface of the right lobe of the liver  consistent with metastatic disease. Hypodense lesion in segment 4 (3:139),  consistent with intrahepatic metastasis. GALLBLADDER: No calcified gallstone  SPLEEN: Unremarkable  PANCREAS: No mass or ductal dilatation. ADRENALS: Bilateral adrenal gland lesions consistent with metastatic disease. KIDNEYS/URETERS: Symmetric renal enhancement. No solid renal lesion. PERITONEUM: No ascites. Soft tissue nodules in the right paracolic gutter (4:38)  and anterior to the right kidney (3:71), consistent with metastatic disease. Soft tissue nodule adjacent to the left psoas muscle (3:175), also consistent  with metastatic disease. STOMACH: Unremarkable. SMALL BOWEL: No dilatation or wall thickening. COLON: No dilatation or wall thickening. APPENDIX: Nonvisualized  PELVIS: Compression of the urinary bladder by the expansile left hemipelvic  lesion with associated soft tissue. Prostate gland is normal. No free fluid in  the pelvis. BONES: Expansile destructive lesion of the left scapula, glenoid, left  hemipelvis in addition to multiple lytic lesions of the pelvis, sacrum (S1), and  superior aspect of the L1 vertebral body. Small lytic lesions are noted at the  superior aspect of the T8 vertebral body. Pathologic fracture of the spinous  process of T1 with associated lytic lesion. VESSELS: Normal caliber thoracic aorta, aortic arch vessels, abdominal aorta,  and iliac vessels without aneurysm.  Atherosclerotic plaque of the iliofemoral  vasculature. Pulmonary arteries normal in caliber. No filling defects. IMPRESSION  No evidence of aortic dissection or pulmonary embolism. Metastatic malignancy likely originating from the right lung, with extensive  evidence of metastatic disease including diffuse bone metastases, diffuse right  pleural disease and bilateral lung parenchymal disease, peritoneal nodules,  adrenal metastases, and liver lesions. Oncologic consultation recommended. Assessment and Recommendations:   Irma Ayala is a 58 y.o. male with PAD admitted with left shoulder pain related to presumed metastatic lung cancer. 1. Stage 4 NSCLC adenocarcinoma  Scapula, left, core biopsy and touch prep:   Adenocarcinoma   Immunohistochemical panel to better characterize the tumor will be   reported in an addendum   Right lung mass / R pleural effusion  CT imaging is concerning for a metastatic lung cancer with a R perihilar lung mass, mediastinal lymphadenopathy, R pleural effusion, adrenal and bone metastases. He underwent biopsy 8/29/22     Seen today for fu  In bed doing ok. Trying to eat. Reviewed path of adenocarcinoma. Discussed systemic therapy options are pending molecular testing. Pt will likely not tolerate any major chemo. Can discuss more as outpt. Doing palliative XRT. He will need brain MRI in the near future to complete staging  Palliative Care following  Pt is concerned about dispo and where he will go post hospital.     2. Bone metastases:  2/2 underlying malignancy  Doing palliative XRT. 3. Peripheral artery disease / CAD / NSTEMI:  S/p femoral/peroneal bypass. On ASA. 4. Hyponatremia:  Better. 5. Normocytic anemia:   Likely anemia of chronic disease. No iron/B12/folate deficiency noted. Stable. 6. Psychosocial. Mood quiet. Concerned about dispo. Lives with brother. SW support.      We will follow as needed here  Will see as outpt  Call if questions    Signed By: Sven Wilson, DO     September 1, 2022

## 2022-09-01 NOTE — PROGRESS NOTES
Palliative Medicine Consult  Scott: [phone]    Patient Name: Wes Weston  YOB: 1960    Date of Initial Consult: 8/29/22  Reason for Consult: care decisions- L shoulder and chest pain,due to likely metastatic lung cancer   Requesting Provider: Agustina Goetz- Oncology    Primary Care Physician: None     SUMMARY:   Wes Weston is a 58 y.o. with a past history of peripheral artery disease s/p femoral/peroneal bypass 4/2022, CAD who was admitted on 8/27/2022 from home with L shoulder and L chest pain. CT chest done- showing R lung mass and pulmonary nodules, pleural effusion, diffuse bone mets (L scapula, spine, pelvis), peritoneal nodules, adrenal and liver lesions consistent w/ widespread metastatic disease. Oncology has met w/ him to talk about his likely metastatic lung cancer- could be small cell. On CXR 4/2022 there were no acute findings. Pt just underwent bx of scapular mass 8/29. He has had unintentional weight loss of 15lbs although always on the thin side. Has already met with oncology. 8/30- Slept some last night. Was able to go down for CT simulation and work w/ therapy although was dizzy. 8/31- Received radiation 1 of 4 yest, path has returned +adenocarcinoma  9/1- S/p thoracentesis today. Social: Pt is  but still on good terms with ex wife- he has 4 children incl dtr Hernan Escobar who is local. Lives w/ his brother Trista Baugh who is on hospice for COPD. They live in a 2nd story apartment w/out an elevator and has been getting harder for pt to get up/down stairs. He uses an electric tricycle to get around and go grocery shopping (lives in the fan). Worked as a  for many years, stopped working early this year due to his vascular disease.       PALLIATIVE DIAGNOSES:   Bone pain- nehemias L shoulder and spine pain due to likely malignancy   Weight loss- unintentional   Generalized weakness/debility   Goals of care/advance care planning/code status   Palliative care encounter PLAN:   Pain: Pain should improve w/ radiation- receiving it to L shoulder, L pelvis and R ilium. Continue Dexamethasone 4mg daily until tomorrow. Last radiation tomorrow. Oxycodone 5-10mg every 3h prn pain. Oxycontin 10mg bedtime. Bowel regimen. No constipation. Have referred pt to clinic. Advance care planning and goals of care: Pt does not wish to complete AMD today. Has verbalized mult times to me that he would trust his dtr Amilcar Magana to make decisions on his behalf if he was unable but declines AMD. Will see if he wishes to do it when Amilcar Magana comes in this evening, she is aware that pastoral care can assist.   Pt did sign DDNR today. Speak to dtr Amilcar Magana- she is very aware of medical condition and she shares the concern that pt may be too weak for any type of treatments nehemias chemo. Molecular testing pending- so options may not be able to be laid out for patient until he sees Onc in clinic. Given his debility dtr wonders if hospice would be best option for him. 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 His dtr is going to offer pt to live w/ her, but pt adamant about staying in the apartment w/ his brother. Uncertain how he would even make it to clinic/[facility] for any tx. Amilcar Magana to address these issues tonight w/ pt.    Communicated plan of care with: Palliative IDTDion 192 Team incl Maury care management      GOALS OF CARE / TREATMENT PREFERENCES:     GOALS OF CARE:  Patient/Health Care Proxy Stated Goals: Prolong life    TREATMENT PREFERENCES:   Code Status: DNR    Patient and family's personal goals include sx management     Advance Care Planning:  [x] [facility] Interdisciplinary Team has updated the ACP Navigator with Health Care Decision Maker and Patient Capacity      Primary Decision Maker: Cecilio Clements - Renate - [phone]    Advance Care Planning 4/25/2022   Confirm Advance Directive None   Patient Would Like to Complete Advance Directive No   Does the patient have other document types (No Data)       Medical Interventions: Limited additional interventions       Other:    As far as possible, the palliative care team has discussed with patient / health care proxy about goals of care / treatment preferences for patient. HISTORY:         CHIEF COMPLAINT: \"I think I am breathing better\"    HPI/SUBJECTIVE:    The patient is:   [x] Verbal and participatory  [] Non-participatory due to:     Pt thinks pain and breathing better- just coming back from thoracentesis. Still on O2. Very weak. Upset about his dx and worried about his brother.      Clinical Pain Assessment (nonverbal scale for severity on nonverbal patients):   Clinical Pain Assessment  Severity: 4  Location: L shoulder, spine  Character: aching, deep  Duration: Several weeks  Effect: Movement is harder  Factors: medication helps  Frequency: constant          Duration: for how long has pt been experiencing pain (e.g., 2 days, 1 month, years)  Frequency: how often pain is an issue (e.g., several times per day, once every few days, constant)     FUNCTIONAL ASSESSMENT:     Palliative Performance Scale (PPS):  PPS: 60       PSYCHOSOCIAL/SPIRITUAL SCREENING:     Palliative IDT has assessed this patient for cultural preferences / practices and a referral made as appropriate to needs (Cultural Services, Patient Advocacy, Ethics, etc.)    Any spiritual / Sabianist concerns:  [] Yes /  [x] No   If \"Yes\" to discuss with pastoral care during IDT     Does caregiver feel burdened by caring for their loved one:   [] Yes /  [x] No /  [] No Caregiver Present/Available [] No Caregiver [] Pt Lives at Facility  If \"Yes\" to discuss with social work during IDT    Anticipatory grief assessment:   [x] Normal  / [] Maladaptive     If \"Maladaptive\" to discuss with social work during IDT    ESAS Anxiety: Anxiety: 0    ESAS Depression: Depression: 0        REVIEW OF SYSTEMS:     Positive and pertinent negative findings in ROS are noted above in HPI. The following systems were [x] reviewed / [] unable to be reviewed as noted in HPI  Other findings are noted below. Systems: constitutional, ears/nose/mouth/throat, respiratory, gastrointestinal, genitourinary, musculoskeletal, integumentary, neurologic, psychiatric, endocrine. Positive findings noted below. Modified ESAS Completed by: provider   Fatigue: 6 Drowsiness: 0   Depression: 0 Pain: 4   Anxiety: 0 Nausea: 0   Anorexia: 8 Dyspnea: 1           Stool Occurrence(s): 1        PHYSICAL EXAM:     From RN flowsheet:  Wt Readings from Last 3 Encounters:   08/29/22 102 lb (46.3 kg)   04/28/22 115 lb 8.3 oz (52.4 kg)   04/25/22 115 lb 8.3 oz (52.4 kg)     Blood pressure 102/65, pulse 79, temperature 97.7 °F (36.5 °C), resp. rate 18, height 5' 8\" (1.727 m), weight 102 lb (46.3 kg), SpO2 95 %.     Pain Scale 1: Numeric (0 - 10)  Pain Intensity 1: 6  Pain Onset 1: chronic  Pain Location 1: Back  Pain Orientation 1: Left  Pain Description 1: Aching  Pain Intervention(s) 1: Medication (see MAR)  Last bowel movement, if known: 8/31    Constitutional: awake, alert, oriented, no sedation or confusion, very thin w/ generalized muscle wasting   Eyes: pupils equal  ENMT: no nasal discharge, moist mucous membranes  Cardiovascular: regular rhythm  Respiratory: breathing not labored, coarse breath sounds, speaking in full sentences   Gastrointestinal: soft non-tender, +bowel sounds  Musculoskeletal: no deformity, no tenderness to palpation  Skin: warm, dry  Neurologic: following commands, moving all extremities  Psychiatric: slightly anxious          HISTORY:     Principal Problem:    NSTEMI (non-ST elevated myocardial infarction) ([facility] (8/27/2022)    Active Problems:    Severe protein[facility]calorie malnutrition [facility] (8/29/2022)    Past Medical History:   Diagnosis Date    Arthritis     GERD (gastroesophageal reflux disease)       Past Surgical History:   Procedure Laterality Date    HX APPENDECTOMY      AS CHILD    HX ENDOSCOPY      1990's    HX VASCULAR STENT      X3 STENTS GROIN    HX WISDOM TEETH EXTRACTION      ALL TEETH REMOVED      Family History   Problem Relation Age of Onset    Heart Disease Mother         HEART ATTACK     Emphysema Father     No Known Problems Sister     COPD Brother     Lung Disease Brother     Other Brother         MOTOR VEHICLE ACCIDENT    No Known Problems Daughter     No Known Problems Daughter     No Known Problems Daughter     No Known Problems Son     Anesth Problems Neg Hx       History reviewed, no pertinent family history. Social History     Tobacco Use    Smoking status: Every Day     Packs/day: 0.50     Years: 40.00     Pack years: 20.00     Types: Cigarettes    Smokeless tobacco: Never   Substance Use Topics    Alcohol use:  Yes     Alcohol/week: 2.0 standard drinks     Types: 2 Cans of beer per week     Comment: DAILY     No Known Allergies   Current Facility-Administered Medications   Medication Dose Route Frequency    naloxone (NARCAN) injection 0.4 mg  0.4 mg IntraVENous EVERY 2 MINUTES AS NEEDED    oxyCODONE ER (OxyCONTIN) tablet 10 mg  10 mg Oral DAILY    albuterol-ipratropium (DUO-NEB) 2.5 MG-0.5 MG/3 ML  3 mL Nebulization QID RT    oxyCODONE IR (ROXICODONE) tablet 5 mg  5 mg Oral Q3H PRN    oxyCODONE IR (ROXICODONE) tablet 10 mg  10 mg Oral Q3H PRN    pantoprazole (PROTONIX) tablet 40 mg  40 mg Oral ACB    dexAMETHasone (DECADRON) tablet 4 mg  4 mg Oral DAILY    acetaminophen (TYLENOL) tablet 650 mg  650 mg Oral TID    senna-docusate (PERICOLACE) 8.6-50 mg per tablet 2 Tablet  2 Tablet Oral DAILY    aspirin delayed-release tablet 81 mg  81 mg Oral 7am    sodium chloride (NS) flush 5-40 mL  5-40 mL IntraVENous Q8H    sodium chloride (NS) flush 5-40 mL  5-40 mL IntraVENous PRN    acetaminophen (TYLENOL) tablet 650 mg  650 mg Oral Q6H PRN    Or    acetaminophen (TYLENOL) suppository 650 mg  650 mg Rectal Q6H PRN    polyethylene glycol (MIRALAX) packet 17 g  17 g Oral DAILY PRN    ondansetron (ZOFRAN ODT) tablet 4 mg  4 mg Oral Q8H PRN    Or    ondansetron (ZOFRAN) injection 4 mg  4 mg IntraVENous Q6H PRN    L.acidophilus-paracasei-S.thermophil-bifidobacter (RISAQUAD) 8 billion cell capsule  1 Capsule Oral DAILY    metoprolol tartrate (LOPRESSOR) tablet 25 mg  25 mg Oral Q12H    enoxaparin (LOVENOX) injection 30 mg  30 mg SubCUTAneous Q24H    lactated Ringers infusion  100 mL/hr IntraVENous CONTINUOUS    nitroglycerin (NITROSTAT) tablet 0.4 mg  0.4 mg SubLINGual Q5MIN PRN    morphine injection 2 mg  2 mg IntraVENous Q4H PRN          LAB AND IMAGING FINDINGS:     Lab Results   Component Value Date/Time    WBC 19.0 (H) 09/01/2022 04:22 AM    HGB 9.4 (L) 09/01/2022 04:22 AM    PLATELET 986 (H) 98/73/4148 04:22 AM     Lab Results   Component Value Date/Time    Sodium 132 (L) 09/01/2022 04:22 AM    Potassium 4.5 09/01/2022 04:22 AM    Chloride 98 09/01/2022 04:22 AM    CO2 27 09/01/2022 04:22 AM    BUN 12 09/01/2022 04:22 AM    Creatinine 0.58 (L) 09/01/2022 04:22 AM    Calcium 8.7 09/01/2022 04:22 AM    Magnesium 1.9 09/01/2022 04:22 AM    Phosphorus 4.3 09/01/2022 04:22 AM      Lab Results   Component Value Date/Time    Alk.  phosphatase 128 (H) 08/27/2022 04:39 PM    Protein, total 6.4 09/01/2022 08:53 AM    Albumin 2.7 (L) 08/27/2022 04:39 PM    Globulin 5.0 (H) 08/27/2022 04:39 PM     Lab Results   Component Value Date/Time    INR 1.0 04/25/2022 12:51 AM    Prothrombin time 10.1 04/25/2022 12:51 AM    aPTT 35.4 (H) 08/28/2022 05:23 AM      Lab Results   Component Value Date/Time    Iron 34 (L) 08/28/2022 05:23 AM    TIBC 202 (L) 08/28/2022 05:23 AM    Iron % saturation 17 (L) 08/28/2022 05:23 AM    Ferritin 1,008 (H) 08/28/2022 05:29 AM      No results found for: PH, PCO2, PO2  No components found for: GLPOC   No results found for: CPK, CKMB             Total time:   Counseling / coordination time, spent as noted above:   > 50% counseling / coordination?:     Prolonged service was provided for  []30 min   []75 min in face to face time in the presence of the patient, spent as noted above. Time Start:   Time End:   Note: this can only be billed with 60994 (initial) or 49219 (follow up). If multiple start / stop times, list each separately.

## 2022-09-01 NOTE — PROGRESS NOTES
Rapid Response called overhead, RRT responding. Rapid called for chest pain. EKG and troponin ordered. Patient here for NSTEMI and has history of lung cancer and is currently getting radiation. Scheduled for paracentesis later today. Dr. Piña Police at bedside to evaluate patient.     Primary RN placed 3rd peripheral IV and obtained labs    0900: EKG tech arrived     Vital sign remain stable, RRT will continue to follow

## 2022-09-01 NOTE — PROGRESS NOTES
Spiritual Care Assessment/Progress Note  [facility]      NAME: Tiff Reid      MRN: [id number]  AGE: 58 y.o.  SEX: male  Adventism Affiliation: No preference   Language: English     9/1/2022     Total Time (in minutes): 19     Spiritual Assessment begun in [address] through conversation with:         [x]Patient        [] Family    [] Friend(s)        Reason for Consult: Palliative Care, Initial/Spiritual Assessment     Spiritual beliefs: (Please include comment if needed)     [] Identifies with a dee dee tradition:         [] Supported by a dee dee community:            [] Claims no spiritual orientation:           [] Seeking spiritual identity:                [] Adheres to an individual form of spirituality:           [x] Not able to assess:                           Identified resources for coping:      [] Prayer                               [] Music                  [] Guided Imagery     [x] Family/friends                 [] Pet visits     [] Devotional reading                         [] Unknown     [] Other:                                               Interventions offered during this visit: (See comments for more details)    Patient Interventions: Affirmation of emotions/emotional suffering, Catharsis/review of pertinent events in supportive environment, Initial/Spiritual assessment, patient floor, Life review/legacy, Prayer (assurance of)           Plan of Care:     [] Support spiritual and/or cultural needs    [] Support AMD and/or advance care planning process      [] Support grieving process   [] Coordinate Rites and/or Rituals    [] Coordination with community clergy   [] No spiritual needs identified at this time   [] Detailed Plan of Care below (See Comments)  [] Make referral to Music Therapy  [] Make referral to Pet Therapy     [] Make referral to Addiction services  [] Make referral to [facility]  [] Make referral to Spiritual Care Partner  [] No future visits requested [x] Contact Spiritual Care for further referrals     Comments: Provided support for this pt in [facility]. Reviewed pt's chart prior to this visit. Attempted to facilitate life review to assess potential support needs or coping strategies. Pt initially declined visit but then offered a brief review of current situation. Pt also shared some external stressors such as the care for pt's brother, of which pt normally takes care. Validated the difficult challenges that pt faces as he recovers. Assured pt of prayers. Jamin Interiano MDiv.  Staff   Request  Support/Spiritual Care Services on [phone])

## 2022-09-02 NOTE — HOSPICE
[facility]  [phone]    Patient Name: Samantha Noble  YOB: 1960  Age: 58 y.o. [address] [facility] LCSW Note:  Hospice consult noted. Chart reviewed. Plan of care discussed with patients care manager. This LCSW and Eric Saul RN in to meet with pt. Pt is lethargic, A&Ox4. Discussed Hospice philosophy, general plan of care, levels of care, services and on call procedures. 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 Pt's wish is to return home with his brother, although daughter may want him to go to her home for EOL care. Pt has been a , can no longer work due to illness. His last pay check was 6/11/22. Pt reports he last day of actual work was April 29th. Since then and 6/11/22/ the company has been paying his for 30 hrs of work per week. LCSW and pt began FAP application. Pt had an appointment to apply for Medicare and SS and unemployment in Oct. LCSW called pts daughter, discussed hospice FAP and informed her of supporting documents needed for pts FAP. Daughter will be at bedside Sun for [facility] meeting. LCSW left FAP form in office for liaison to being to Sunday meeting. Family information packet provided & reviewed. Thank you for the opportunity to be of service to Mr. Rosanne Joyce and his daughter.

## 2022-09-02 NOTE — HOSPICE
[facility]  [phone]     Patient Name: Jesus Dominguez  YOB: 1960  Age: 58 y.o. Alexei Staley RN Note:  I spoke with his daughter, Merly Juan, by phone. She is hoping to take her home with with her initially and if he declines and needs GIP level of care she is open to [facility] transfer. Her home address is: [address] [place] [address], [address]  I have verified that [facility] can accept the patient at this address. Patient's daughter plans to meet with hospice at noon on Sunday to plan for discharge. We will hold an afternoon slot for home admission on Monday. Myself and SW met with the patient. He is pleasant, alert and oriented. He understands his diagnosis and prognosis and is open to hospice care. 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 His brother has end stage COPD and is currently in home hospice. He states he cannot leave his brother alone. I assured him that Merly Juan, his daughter, is working on this. Merly Juan is going to call the hospice caring for patient's brother and speak with their SW today. He currently has a chest tube in place with bloody drainage. I have reached out to Dr Lori Villatoro to see if he will need pleural drain placed before going home at chest tube will need to be clamped for 24 hours to allow placement. I was able to speak to janneth Almaguer. He would like this chest tube to remain draining until tomorrow and then could be clamped for placement of pleural drain on Sunday. Admission slot is held for 2pm on Monday  CM setting up transport for 11:30am  Will continue to monitor him over the weekend for any decline that would warrant GIP admission and will cancel home admission should this be the case. FAP is being filled out and hospice intake is working to verify his insurance. I will be here on Saturday and plan to follow up and meet with patient again. Thank you for the opportunity to be of service to this patient.      Dago Cates RN  Clinical Nurse Liaison   [facility]  (Y)[phone] (T) [phone]

## 2022-09-02 NOTE — PROGRESS NOTES
Problem: Pressure Injury - Risk of  Goal: *Prevention of pressure injury  Description: Document Vipul Scale and appropriate interventions in the flowsheet. Outcome: Progressing Towards Goal  Note: Pressure Injury Interventions:  Sensory Interventions: Minimize linen layers         Activity Interventions: PT/OT evaluation    Mobility Interventions: PT/OT evaluation    Nutrition Interventions: Offer support with meals,snacks and hydration    Friction and Shear Interventions: Minimize layers                Problem: Patient Education: Go to Patient Education Activity  Goal: Patient/Family Education  Outcome: Progressing Towards Goal     Problem: Falls - Risk of  Goal: *Absence of Falls  Description: Document Ijeoma Fall Risk and appropriate interventions in the flowsheet.   Outcome: Progressing Towards Goal  Note: Fall Risk Interventions:  Mobility Interventions: Bed/chair exit alarm         Medication Interventions: Bed/chair exit alarm    Elimination Interventions: Call light in reach

## 2022-09-02 NOTE — PROGRESS NOTES
Transition of Care Plan  RUR- 17%   DISPOSITION: The disposition plan is Home w/ Hospice; pending medical progression  Hospice:   [facility] referral pending   F/U with PCP/Specialist    Transport: BLS  Following: Palliative, Radiation Therapy, Hem and Onc,   The pt recently completed 4 out of 4 radiation treatments to the  LT shoulder, LT pelvis, and RT iliac   Barrier; Pain control, medical, Dispo options,    Main Contact: Daniela Chris, Daughter [phone]    Per conversation with the pt; this cm discussed MARISSA. This cm inquired about the pt's thoughts on transitioning to his daughter's home upon discharge w/ Hospice services. The pt reported that he was open to the idea but he is not willing to leave his brother without having a solid plan for him. He reported that he is anticipating receiving an eviction notice as he is unable to pay his rent. This cm inquired if the pt's brother had any other supports; he reported that he only has him. This cm informed him that they would need to contact the Hospice Agency to determine if the pt is eligible for any other resources such as: transitioning to their [address]. He reported that he would check with his brother. This cm informed the pt that if he consents to Hospice they'll be able to admit the pt on Monday. He reported that he is ok with that but something has to be done for his brother. This cm provided the pt with things to identify as it pertains to the pt's brother; payor source (Medicaid or Medicare) and the servicing Hospice Agency. This cm informed him that his brother may have to transition to the Hospital just to receive supports with being potentially placed in a NF for LTC, if unable to secure housing. This cm informed him that we would follow up over the weekend.        CM: [address] [place],   [phone]

## 2022-09-02 NOTE — ADT AUTH CERT NOTES
Pleural Effusion - Care Day 6 (9/1/2022) by Tosha Irving       Review Status Review Entered   Completed 9/2/2022 09:56      Criteria Review      Care Day: 6 Care Date: 9/1/2022 Level of Care: Inpatient Floor    Guideline Day 2    Level Of Care    (X) Floor    9/2/2022 09:54:02 EDT by Be Zimmer      inpatient medical    Clinical Status    (X) * Hemodynamic stability    9/2/2022 09:56:41 EDT by Be Zimmer      97.7 °F, 82, 113/73, 18, 90% nasal cannula at 6LPM    ( ) * Respiratory distress absent    9/2/2022 09:56:41 EDT by Be Zimmer      Had RRT this am for chest pains   Stable right now    Activity    ( ) Activity as tolerated    9/2/2022 09:54:02 EDT by Audelia Pickens 42 (PT/OT)    Routes    ( ) Oral hydration    9/2/2022 09:54:02 EDT by Be Zimmer      lactated ringers 100ml/hr continuous IV    (X) Oral medications    9/2/2022 09:54:02 EDT by Be Zimmer      aspirin 81mg QD PO, decadron 4mg QD PO, risaquad 1cap QD PO, lopressor 25mg q12 PO 1x, protonix 40mg QD PO, pericolace 2tab QD PO, duo-neb 3ml QID neb 1x    (X) Usual diet    9/2/2022 09:54:02 EDT by Pablo Quijano, [address] [place] [address] with oral supplements TID    Medications    (X) Oral or parenteral analgesics    9/2/2022 09:54:02 EDT by Be Zimmer      tylenol 650mg TID PO, oxycontin 10mg QD PO, roxicodone 10mg q3 prn PO 1x    * Milestone   Additional Notes    DATE: 09/01/2022      Pertinent Updates:   Status post RRT this morning for chest tightness and pain   Seen by oncology   Patient received radiation treatment number 3 of 4 to the LT shoulder, LT pelvis, and RT iliac today      Abnl/Pertinent Labs/Radiology/Diagnostic Studies:   WBC: 19.0 (H)   RBC: 2.93 (L)   HGB: 9.4 (L)   HCT: 28.1 (L)   RDW: 14.8 (H)   PLATELET: 540 (H)   NEUTROPHILS: 84 (H)   LYMPHOCYTES: 10 (L)   IMMATURE GRANULOCYTES: 1 (H)   ABS. NEUTROPHILS: 16.0 (H)   ABS. IMM.  GRANS.: 0.2 (H)   Sodium: 132 (L)   Glucose: 120 (H)   Creatinine: 0.58 (L)   BUN/Creatinine ratio: 21 (H)   LD: 2,005 (H)   Troponin-High Sensitivity: 88 (H)   FLUID RBC CT.: >100 (H)   FLD NEUTROPHILS: 27 ! FLD LYMPHS: 60 ! FLD MONO/MACROPHAGES: 10 ! FLUID MESOTHELIAL: 3 ! Physical Exam:   Respiratory: Normal respiratory effort. On 02   MS: states can walk a few feet. Has muscle wasting   Skin: No rashes, ecchymoses, or petechiae. Normal temperature, turgor, and texture. MD Consults/Assessments & Plans:   **Pulmonology**   Impression   Right lung mass with hilar mass/lymphadenopathy. New since April CXR. Right pleural effusion with pleural masses. Nicotine dependence. NSTEMI   Dyspnea      Plan   Right thoracentesis today   Pleural fluid labs are in place   Separate LDH and protein, order in place. Were not done so we will do an add on    Bronchodilators   O2 titration above 90%    Discussed with hospitalist yesterday       **Oncology**   Assessment and Recommendations:   Marisol Hernández is a 58 y.o. male with PAD admitted with left shoulder pain related to presumed metastatic lung cancer. 1. Stage 4 NSCLC adenocarcinoma   Scapula, left, core biopsy and touch prep:    Adenocarcinoma    Immunohistochemical panel to better characterize the tumor will be    reported in an addendum    Right lung mass / R pleural effusion   CT imaging is concerning for a metastatic lung cancer with a R perihilar lung mass, mediastinal lymphadenopathy, R pleural effusion, adrenal and bone metastases. He underwent biopsy 8/29/22        Seen today for fu   In bed doing ok. Trying to eat. Reviewed path of adenocarcinoma. Discussed systemic therapy options are pending molecular testing. Pt will likely not tolerate any major chemo. Can discuss more as outpt. Doing palliative XRT.     He will need brain MRI in the near future to complete staging   Palliative Care following   Pt is concerned about dispo and where he will go post [facility].        2. Bone metastases:   2/2 underlying malignancy   Doing palliative XRT. 3. Peripheral artery disease / CAD / NSTEMI:   S/p femoral/peroneal bypass. On ASA. 4. Hyponatremia:   Better. 5. Normocytic anemia:    Likely anemia of chronic disease. No iron/B12/folate deficiency noted. Stable. 6. Psychosocial. Mood quiet. Concerned about dispo. Lives with brother. SW support. **Palliative medicine**   PLAN:   Pain: Pain should improve w/ radiation- receiving it to L shoulder, L pelvis and R ilium. Continue Dexamethasone 4mg daily until tomorrow. Last radiation tomorrow. Oxycodone 5-10mg every 3h prn pain. Oxycontin 10mg bedtime. Bowel regimen. No constipation. Have referred pt to clinic. Advance care planning and goals of care: Pt does not wish to complete AMD today. Has verbalized mult times to me that he would trust his dtr Nayeli Vanegas to make decisions on his behalf if he was unable but declines AMD. Will see if he wishes to do it when Nayeli Vanegas comes in this evening, she is aware that pastoral care can assist.    Pt did sign DDNR today. Speak to dtr Nayeli Vanegas- she is very aware of medical condition and she shares the concern that pt may be too weak for any type of treatments nehemias chemo. Molecular testing pending- so options may not be able to be laid out for patient until he sees Onc in clinic. Given his debility dtr wonders if hospice would be best option for him. 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 His dtr is going to offer pt to live w/ her, but pt adamant about staying in the apartment w/ his brother. Uncertain how he would even make it to clinic/OPI for any tx. Nayeli Vanegas to address these issues tonight w/ pt.        **Hospitalist**   Assessment and Plan:   Chest pain in the setting of getting radiation for adenocarcinoma of lung   --We will add PPI before meals   --Initial EKG normal sinus rhythm   --Continue to trend troponin if chest pain continues   --Patient has right-sided pleural effusion requested IR to drain the fluid today      Assessment & Plan:       R lung mass with hilar LAD, and widely metastatic bony disease   Acute hypoxic respiratory failure   Pleural effusions - suspect malignant    - s/p IR guided biopsy of scapula 8/29   - Oncology and pulmonary consulted and following   - pain control with oxycodone, and added decadron as well    - Palliative care following    - Likely will need brain MRI also for staging        L Shoulder pain - secondary to bony metastasis likely (in glenoid, scapula)   - IV morphine    - Add PO oxycodone PRN    - Added decadron per palliative   - Rad Onc initiated palliative radiation to shoulder    - Palliative care following, appreciate assistance        Elevated troponin -    --Status post RRT troponin trending down  Given presence of malignancy and location suspect CP and shoulder pain related to above. - Discussed with Dr.Ro. SHELTON heparin gtt    - Echo 55-60%        PVD - s/p L fem-perineal bypass    - Contiue ASA          Medications:   Enoxaparin 30mg q24 SC         PT/OT/SLP/CM Assessments or Notes:   Physical Therapy Note/Occupational therapy       Pt with RR this AM due to chest pain. Patient with hx of lung CA currently recieving radiation and for paracentesis today.  Will defer for medical stability              Pleural Effusion - Care Day 5 (8/31/2022) by Diana Yoon       Review Status Review Entered   Completed 9/2/2022 09:54      Criteria Review      Care Day: 5 Care Date: 8/31/2022 Level of Care: Inpatient Floor    Guideline Day 1    Level Of Care    (X) ICU or floor    9/2/2022 09:37:00 [facility]    Clinical Status    (X) * Clinical Indications met    9/2/2022 09:37:00 EDT by Margaret Leonard      Chest xray report: IMPRESSION  Large, worsening right-sided pleural effusion    Activity    (X) Bed rest    9/2/2022 09:37:00 EDT by [employer] MYRTLE Alegria, Audelia Marin 42 (PT/OT)    Routes    ( ) Oral hydration    9/2/2022 09:37:00 EDT by Dodie Gibbs      lactated ringers 100ml/hr cointinuous IV    (X) Oral medications    9/2/2022 09:37:00 EDT by Dodie Gibbs      aspirin 81mg QD PO, decadron 4mg QD PO, risaquad 1cap QD PO, lopressor 25mg q12 PO, protonix 40mg QD PO, duo-neb 3ml QID neb    (X) Usual diet    9/2/2022 09:45:52 EDT by [facility] Christiano Alegria, [address] [facility] with oral supplements TID    Interventions    (X) Ultrasound-guided thoracentesis    9/2/2022 09:37:00 EDT by Dodie Gibbs      Finally agreeable to thoracentesis; Right thoracentesis today    (X) Pleural fluid analysis    Medications    (X) Oral or parenteral analgesics    9/2/2022 09:37:00 EDT by Dodie Gibbs      tylenol 650mg TID PO, oxycontin 10mg QD PO, roxicodone 10mg q3 prn PO 3x,    9/2/2022 09:54:19 EDT by Dodie Gibbs    Subject: Additional Clinical Information    CORRECTION: duo-neb 3ml QID neb 2x       * Milestone   Additional Notes    DATE: 08/31/2022      Pertinent Updates:   -Patient received radiation treatment number 2 of 4 to the LT shoulder, LT pelvis, and RT iliac today   -for planned right thoracentesis today      Vitals:   97.9 °F, 87, 107/67, 16, 90 % Nasal cannula 6 l/min       Abnl/Pertinent Labs/Radiology/Diagnostic Studies:   WBC: 18.2 (H)   RBC: 2.74 (L)   HGB: 8.8 (L)   HCT: 26.5 (L)   RDW: 14.8 (H)   PLATELET: 316 (H)   NEUTROPHILS: 81 (H)   IMMATURE GRANULOCYTES: 1 (H)   ABS. NEUTROPHILS: 14.8 (H)   ABS. IMM. GRANS.: 0.2 (H)   ABS. MONOCYTES: 1.1 (H)   Creatinine: 0.48 (L)   BUN/Creatinine ratio: 25 (H)   Calcium: 8.3 (L)      Physical Exam:   Same as yesterday      MD Consults/Assessments & Plans:   **Palliative medicine**   PLAN:   Pain: Pain should improve w/ radiation. Continue Dexamethasone 4mg daily until Friday. Oxycodone 5-10mg every 3h prn pain.     Oxycontin 10mg bedtime. Bowel regimen. AMD: Pt does not wish to complete AMD today. To get DDNR before discharge. Having difficult time with cancer dx (path returned). Will talk with nursing staff to turn off bed alarm at night. **Hospitalist**   Assessment & Plan:       R lung mass with hilar LAD, and widely metastatic bony disease   Acute hypoxic respiratory failure   Pleural effusions - suspect malignant    - s/p IR guided biopsy of scapula 8/29   - Oncology and pulmonary consulted and following   - pain control with oxycodone, and added decadron as well    - Palliative care following    - Likely will need brain MRI also for staging but can be done o/p    - Repeat Xray today        L Shoulder pain - secondary to bony metastasis likely (in glenoid, scapula)   - IV morphine    - Add PO oxycodone PRN    - Added decadron per palliative   - Rad Onc initiated palliative radiation to shoulder    - Palliative care following, appreciate assistance        Elevated troponin - flat. Given presence of malignancy and location suspect CP and shoulder pain related to above. - Discussed with Dr. Lissette Eagle. DC heparin gtt    - Echo 55-60%        PVD - s/p L fem-perineal bypass    - Contiue ASA      **Pulmonology**   Impression   Right lung mass with hilar mass/lymphadenopathy. New since April CXR. Right pleural effusion with pleural masses. Nicotine dependence.    NSTEMI   Dyspnea      Plan   Right thoracentesis today   Pleural fluid labs are in place   Separate LDH and protein, order in place    Bronchodilators   O2 titration above 90%       Medications:   Enoxaparin 30 mg q24 SC         PT/OT/SLP/CM Assessments or Notes:   Physical Therapy/Occupational therapy   Chart reviewed, came to see pt however he just returned from radiation and is politely declining therapy at this time, will continue to follow              Myocardial Infarction - Care Day 4 (8/30/2022) by [facility]       Review Status Review Entered Completed 9/2/2022 09:45      Criteria Review      Care Day: 4 Care Date: 8/30/2022 Level of Care: Inpatient Floor    Guideline Day 2    Level Of Care    ( ) Intermediate care or telemetry    9/2/2022 09:08:47 EDT by Shailesh Chen      transferred to inpatient medical floor today    Clinical Status    (X) * Hemodynamic stability    9/2/2022 09:08:47 EDT by Shailesh Chen      temp: 97.5 F  AZ: 81-94;  Sinus Rhythm;  BP: 101/59, 110/74, 118/68  RR: 18  02 sat: 95% nasal cannula at 6LPM    (X) * Chest pain, dyspnea, or anginal equivalent absent    9/2/2022 09:08:47 EDT by Shailesh Chen      absent    Activity    ( ) Activity as tolerated    9/2/2022 09:08:47 EDT by Audelia Cespedes 42 (PT/OT)    (X) Possibly begin ambulation    9/2/2022 09:08:47 EDT by Shailesh Chen      turns self    Routes    (X) * Oral hydration    9/2/2022 09:08:47 EDT by Shialesh Chen      lactated ringers 100ml/hr continuous IV    (X) * Oral medications    9/2/2022 09:45:11 EDT by Shailesh Chen      risaquad 1cap QD PO    9/2/2022 09:08:47 EDT by Shailesh Chen      tylenol 650mg TID PO, decadron 4mg QD PO, oxycodone 10mg QD PO, roxicodone 5mg q3 prn PO 2x, protonix 40mg QD PO, duo-neb 3ml QID neb 1x,    (X) Oral diet as tolerated    9/2/2022 09:08:47 EDT by Holly Reed, [address] [place] [address] with oral supplements TID    Interventions    ( ) * Oxygen absent    9/2/2022 09:08:47 EDT by Shailesh Chen      on nasal cannula at 6LPM    (X) ECG, cardiac biomarkers    (X) PT/PTT and platelet count    Medications    (X) * IV nitroglycerin absent    9/2/2022 09:08:47 EDT by Shailesh Chen      absent    (X) Anticoagulants    9/2/2022 09:08:47 EDT by Shailesh Chen      lovenox 30mg q24 SC    (X) Antiplatelet agents (eg, aspirin, clopidogrel, ticagrelor)    9/2/2022 09:08:47 EDT by Shailesh Chen      aspirin 81mg QD PO    (X) Beta-blocker    9/2/2022 09:08:47 EDT by Shailesh esposito 25mg q12 PO    * Milestone   Additional Notes    DATE: 08/30/2022      Pertinent Updates: Follow up bx of bone lesion    Right thoracentesis probably tomorrow if he is agreeable    O2 requirements increased overnight now on 6LNC, despite this he does not complain of worsening SOB. Awaiting rad onc consult today for L shoulder pain, ongoing pain today    Abnl/Pertinent Labs/Radiology/Diagnostic Studies:         Physical Exam:   Respiratory: Normal respiratory effort. On 02   MS: states can walk a few feet. Has muscle wasting      MD Consults/Assessments & Plans:   **Pulmonology**   Impression   Right lung mass with hilar mass/lymphadenopathy. New since April CXR. Right pleural effusion with pleural masses. Nicotine dependence. NSTEMI   Dyspnea      Plan   Follow up bx of bone lesion    Right thoracentesis probably tomorrow if ihe is agreeable    Bronchodilators   O2 titration above 90%       **Hospitalist**   Assessment & Plan:       R lung mass with hilar LAD, and widely metastatic bony disease   - s/p IR guided biopsy of scapula 8/29   - Oncology and pulmonary consulted and following   - pain control with oxycodone, and added decadron as well    - Palliative care following    - Likely will need brain MRI also for staging        L Shoulder pain - secondary to bony metastasis likely (in glenoid, scapula)   - IV morphine    - Add PO oxycodone PRN    - Added decadron per palliative   - Rad Onc consult for possible radiation to see today    - Will benefit from palliative care        Elevated troponin - flat. Given presence of malignancy and location suspect CP and shoulder pain related to above. - Discussed with Dr. Bayron Perez. DC heparin gtt    - Echo 55-60%        PVD - s/p L fem-perineal bypass    - Contiue ASA       Hyponatremia - mild, monitor    Tobacco abuse - current smoker.  Declined nicotine patch       **Hematology**   Assessment and Recommendations:   Tiff Reid is a 58 y.o. male with PAD admitted with left shoulder pain related to presumed metastatic lung cancer. 1. Right lung mass / R pleural effusion:   CT imaging is concerning for a metastatic lung cancer with a R perihilar lung mass, mediastinal lymphadenopathy, R pleural effusion, adrenal and bone metastases. He underwent biopsy 8/29/22 and pathology pending   Rad/Onc consult pending for pain control at bony disease       Once pathology results, will discuss diagnosis in detail and potential treatment options   He will need brain MRI in the near future to complete staging   Palliative Care following       2. Bone metastases:   2/2 underlying malignancy   Rad/Onc consult pending        3. Peripheral artery disease / CAD / NSTEMI:   S/p femoral/peroneal bypass. On ASA. 4. Hyponatremia:   May be SIADH 2/2 lung cancer. 5. Normocytic anemia:    Likely anemia of chronic disease. No iron/B12/folate deficiency noted. We will follow for path       I personally saw and evaluated the patient and performed the key components of medical decision making. The history, physical exam, and documentation were performed by Lore Perez NP. I reviewed and verified the above documentation and modified it as needed. Pt in bed doing ok   Still weak overall on 02   Path still pending   Seeing Rad/onc and palliative care   Will follow for path      **Palliative Medicine**   PALLIATIVE DIAGNOSES:   Bone pain- nehemias L shoulder and spine pain due to likely malignancy    Weight loss- unintentional    Generalized weakness/debility    Goals of care/advance care planning/code status    Palliative care encounter             PLAN:   Pain: with hx of some chronic lower back pain, may be related to his job w/ yue and now w/ likely osseous mets- diffuse. Reports that pain improved some w/ changes in medication today. Has used 4 doses of the 5mg Oxycodone IR in past 24h.  Ensure that he knows he also has 10mg dose but states that he is not sure he needs that right now. No sedation or confusion. Cont to monitor dizziness and blood pressure. Cont Dexamethasone 4mg daily for now for short burst- will help as radiation starts. Oxycodone 5-10mg po every 3h prn pain. Will consider 10mg Oxycodone extended release at bedtime as pain interferes w/ sleep. Did get lightheaded with therapy but asx when lying down. Also need to monitor incr O2 needs. Cont scheduled Tylenol and bowel regimen. AMD: will see if pt wishes to do AMD tmrw, and can sign DDNR form at same time. PT/OT/SLP/CM Assessments or Notes:   **occupational therapy**   ASSESSMENT   He currently presents with generalized weakness, decreased endurance, limited distal reach and overhead reach, L shoulder pain, and limited insight into deficits. Patient is independent w/ RLE dressing, however requires total A for LLE dressing due to L shoulder pain limiting his mobility and gross coordination. Sup>sit  EOB with CGA and BP in sitting 99/64. Patient set up with RW, performed sit>stand with CGA. BP in standing 98/57. Returned to bed due to low BP and reports of mild/moderate dizziness. PLAN :   Recommendations and Planned Interventions: self care training, functional mobility training, therapeutic exercise, balance training, therapeutic activities, endurance activities, patient education, and home safety training       Frequency/Duration: Patient will be followed by occupational therapy 2-3x/week to address goals. **Physical therapy**   Upon entering room, pt observed sitting  up in bed, eating breakfast, requesting deferral until after meal. Will defer at this time and follow up later in day as able and appropriate. Addendum: Returned later in AM.  Pt not on SpO2 monitoring and donned new pulse ox however room monitor not working. RN aware. Before commencement of PT eval, pt with complaints of dizziness at rest and deemed not appropriate for OOB activity at this time.  Will hold eval at this time and follow up as able and appropriate.

## 2022-09-02 NOTE — PROGRESS NOTES
Problem: Pressure Injury - Risk of  Goal: *Prevention of pressure injury  Description: Document Vipul Scale and appropriate interventions in the flowsheet.   Outcome: Progressing Towards Goal  Note: Pressure Injury Interventions:  Sensory Interventions: Minimize linen layers         Activity Interventions: PT/OT evaluation    Mobility Interventions: PT/OT evaluation    Nutrition Interventions: Offer support with meals,snacks and hydration    Friction and Shear Interventions: HOB 30 degrees or less

## 2022-09-02 NOTE — PROGRESS NOTES
Palliative Medicine Consult  Scott: [phone] ([phone])    Patient Name: Marisol Hernández  YOB: 1960    Date of Initial Consult: 8/29/22  Reason for Consult: care decisions- L shoulder and chest pain,due to likely metastatic lung cancer   Requesting Provider: Jayne Link- Oncology    Primary Care Physician: None     SUMMARY:   Marisol Hernández is a 58 y.o. with a past history of peripheral artery disease s/p femoral/peroneal bypass 4/2022, CAD who was admitted on 8/27/2022 from home with L shoulder and L chest pain. CT chest done- showing R lung mass and pulmonary nodules, pleural effusion, diffuse bone mets (L scapula, spine, pelvis), peritoneal nodules, adrenal and liver lesions consistent w/ widespread metastatic disease. Oncology has met w/ him to talk about his likely metastatic lung cancer- could be small cell. On CXR 4/2022 there were no acute findings. Pt just underwent bx of scapular mass 8/29. He has had unintentional weight loss of 15lbs although always on the thin side. Has already met with oncology. 8/30- Slept some last night. Was able to go down for CT simulation and work w/ therapy although was dizzy. 8/31- Received radiation 1 of 4 yest, path has returned +adenocarcinoma  9/1- S/p thoracentesis today. 9/2- completed radiation     Social: Pt is  but still on good terms with ex wife- he has 4 children incl dtr Eula Urbina who is local. Lives w/ his brother Adriana Orellana who is on hospice for COPD. They live in a 2nd story apartment w/out an elevator and has been getting harder for pt to get up/down stairs. He uses an electric tricycle to get around and go grocery shopping (lives in the fan). Worked as a  for many years, stopped working early this year due to his vascular disease.       PALLIATIVE DIAGNOSES:   Bone pain- nehemias L shoulder and spine pain due to likely malignancy   Weight loss- unintentional   Generalized weakness/debility   Goals of care/advance care planning/code status Palliative care encounter        PLAN:   Pain: Pain should improve w/ radiation- receiving it to L shoulder, L pelvis and R ilium. D/c dexamethasone. Oxycodone 5-10mg every 3h prn pain. Oxycontin 10mg bedtime. Bowel regimen. No constipation. Have referred pt to clinic. Advance care planning and goals of care:   Have discussed w/ pt and dtr Swan Bobby. They are open to talking about hospice care in the future, but also want to see if immunotherapy w/ oncology is an option- molecular testing pending. Will need to follow up with Dr Tony Damon in clinic. Pt and dtr very overwhelmed w/ disposition issues to the point where pt unable to talk about overall goals of care. Uncertain where pt will go after the hospital, who will take care of his brother who is on hospice, unable to afford rent for his 2nd story apartment (no elevator), and if he still has active insurance. '  Dtr calls me today asking about hospice admission-however she thought hospice was a facility where pt could go long term. Explain this is not the case, while there is a hospice house it is for patients that meet inpatient hospice criteria and pt does not at this time meet such criteria. Can get them on board, however, as pt will decline. Appreciate care management.    Communicated plan of care with: Palliative [facility] Team incl Maury care management, Felicita Frederick RN, Dr Mary Beth Kilgore / TREATMENT PREFERENCES:     GOALS OF CARE:  Patient/Health Care Proxy Stated Goals: Prolong life    TREATMENT PREFERENCES:   Code Status: DNR    Patient and family's personal goals include sx management     Advance Care Planning:  [x] [facility] Inter[facility] Team has updated the ACP Navigator with [address] and Patient Capacity      Primary Decision Maker: Mikayla Godoy - Daughter - [phone]    Advance Care Planning 4/25/2022   Confirm Advance Directive None   Patient Would Like to Complete Advance Directive No Does the patient have other document types (No Data)       Medical Interventions: Limited additional interventions       Other:    As far as possible, the palliative care team has discussed with patient / health care proxy about goals of care / treatment preferences for patient. HISTORY:         CHIEF COMPLAINT: \"I think I am breathing better\"    HPI/SUBJECTIVE:    The patient is:   [x] Verbal and participatory  [] Non-participatory due to:     +BM this AM. Pain under better control. Breathing also improved. Pt anxious and upset about where he will go after discharge.      Clinical Pain Assessment (nonverbal scale for severity on nonverbal patients):   Clinical Pain Assessment  Severity: 4  Location: L shoulder, spine  Character: aching, deep  Duration: Several weeks  Effect: Movement is harder  Factors: medication helps  Frequency: constant          Duration: for how long has pt been experiencing pain (e.g., 2 days, 1 month, years)  Frequency: how often pain is an issue (e.g., several times per day, once every few days, constant)     FUNCTIONAL ASSESSMENT:     Palliative Performance Scale (PPS):  PPS: 60       PSYCHOSOCIAL/SPIRITUAL SCREENING:     Palliative IDT has assessed this patient for cultural preferences / practices and a referral made as appropriate to needs (Cultural Services, Patient Advocacy, Ethics, etc.)    Any spiritual / Yazidism concerns:  [] Yes /  [x] No   If \"Yes\" to discuss with pastoral care during IDT     Does caregiver feel burdened by caring for their loved one:   [] Yes /  [x] No /  [] No Caregiver Present/Available [] No Caregiver [] Pt Lives at Facility  If \"Yes\" to discuss with social work during IDT    Anticipatory grief assessment:   [x] Normal  / [] Maladaptive     If \"Maladaptive\" to discuss with social work during IDT    ESAS Anxiety: Anxiety: 0    ESAS Depression: Depression: 0        REVIEW OF SYSTEMS:     Positive and pertinent negative findings in ROS are noted above in HPI.  The following systems were [x] reviewed / [] unable to be reviewed as noted in HPI  Other findings are noted below. Systems: constitutional, ears/nose/mouth/throat, respiratory, gastrointestinal, genitourinary, musculoskeletal, integumentary, neurologic, psychiatric, endocrine. Positive findings noted below. Modified ESAS Completed by: provider   Fatigue: 6 Drowsiness: 0   Depression: 0 Pain: 4   Anxiety: 0 Nausea: 0   Anorexia: 8 Dyspnea: 1           Stool Occurrence(s): 1        PHYSICAL EXAM:     From RN flowsheet:  Wt Readings from Last 3 Encounters:   08/29/22 102 lb (46.3 kg)   04/28/22 115 lb 8.3 oz (52.4 kg)   04/25/22 115 lb 8.3 oz (52.4 kg)     Blood pressure 99/70, pulse 100, temperature 98.5 °F (36.9 °C), resp. rate 30, height 5' 8\" (1.727 m), weight 102 lb (46.3 kg), SpO2 93 %.     Pain Scale 1: Numeric (0 - 10)  Pain Intensity 1: 6  Pain Onset 1: chronic  Pain Location 1: Back  Pain Orientation 1: Left  Pain Description 1: Aching  Pain Intervention(s) 1: Medication (see MAR)  Last bowel movement, if known: 9/2    Constitutional: awake, alert, oriented, no sedation or confusion, very thin w/ generalized muscle wasting   Eyes: pupils equal  ENMT: no nasal discharge, moist mucous membranes  Cardiovascular: regular rhythm  Respiratory: breathing not labored, speaking in full sentences   Gastrointestinal: soft non-tender, +bowel sounds  Musculoskeletal: no deformity, no tenderness to palpation  Skin: warm, dry  Neurologic: following commands, moving all extremities  Psychiatric: upset          HISTORY:     Principal Problem:    NSTEMI (non-ST elevated myocardial infarction) ([facility] (8/27/2022)    Active Problems:    Severe protein-calorie malnutrition ([facility] (8/29/2022)    Past Medical History:   Diagnosis Date    Arthritis     GERD (gastroesophageal reflux disease)       Past Surgical History:   Procedure Laterality Date    HX APPENDECTOMY      AS CHILD    HX ENDOSCOPY      1990's    HX VASCULAR STENT X3 STENTS GROIN    HX WISDOM TEETH EXTRACTION      ALL TEETH REMOVED      Family History   Problem Relation Age of Onset    Heart Disease Mother         HEART ATTACK     Emphysema Father     No Known Problems Sister     COPD Brother     Lung Disease Brother     Other Brother         MOTOR VEHICLE ACCIDENT    No Known Problems Daughter     No Known Problems Daughter     No Known Problems Daughter     No Known Problems Son     Anesth Problems Neg Hx       History reviewed, no pertinent family history. Social History     Tobacco Use    Smoking status: Every Day     Packs/day: 0.50     Years: 40.00     Pack years: 20.00     Types: Cigarettes    Smokeless tobacco: Never   Substance Use Topics    Alcohol use:  Yes     Alcohol/week: 2.0 standard drinks     Types: 2 Cans of beer per week     Comment: DAILY     No Known Allergies   Current Facility-Administered Medications   Medication Dose Route Frequency    albuterol-ipratropium (DUO-NEB) 2.5 MG-0.5 MG/3 ML  3 mL Nebulization BID RT    naloxone (NARCAN) injection 0.4 mg  0.4 mg IntraVENous EVERY 2 MINUTES AS NEEDED    oxyCODONE ER (OxyCONTIN) tablet 10 mg  10 mg Oral DAILY    oxyCODONE IR (ROXICODONE) tablet 5 mg  5 mg Oral Q3H PRN    oxyCODONE IR (ROXICODONE) tablet 10 mg  10 mg Oral Q3H PRN    pantoprazole (PROTONIX) tablet 40 mg  40 mg Oral ACB    acetaminophen (TYLENOL) tablet 650 mg  650 mg Oral TID    senna-docusate (PERICOLACE) 8.6-50 mg per tablet 2 Tablet  2 Tablet Oral DAILY    aspirin delayed-release tablet 81 mg  81 mg Oral 7am    sodium chloride (NS) flush 5-40 mL  5-40 mL IntraVENous Q8H    sodium chloride (NS) flush 5-40 mL  5-40 mL IntraVENous PRN    acetaminophen (TYLENOL) tablet 650 mg  650 mg Oral Q6H PRN    Or    acetaminophen (TYLENOL) suppository 650 mg  650 mg Rectal Q6H PRN    polyethylene glycol (MIRALAX) packet 17 g  17 g Oral DAILY PRN    ondansetron (ZOFRAN ODT) tablet 4 mg  4 mg Oral Q8H PRN    Or    ondansetron (ZOFRAN) injection 4 mg  4 mg IntraVENous Q6H PRN    L.acidophilus-paracasei-S.thermophil-bifidobacter (RISAQUAD) 8 billion cell capsule  1 Capsule Oral DAILY    metoprolol tartrate (LOPRESSOR) tablet 25 mg  25 mg Oral Q12H    enoxaparin (LOVENOX) injection 30 mg  30 mg SubCUTAneous Q24H    lactated Ringers infusion  100 mL/hr IntraVENous CONTINUOUS    nitroglycerin (NITROSTAT) tablet 0.4 mg  0.4 mg SubLINGual Q5MIN PRN    morphine injection 2 mg  2 mg IntraVENous Q4H PRN          LAB AND IMAGING FINDINGS:     Lab Results   Component Value Date/Time    WBC 19.0 (H) 09/01/2022 04:22 AM    HGB 9.4 (L) 09/01/2022 04:22 AM    PLATELET 943 (H) 88/92/2725 04:22 AM     Lab Results   Component Value Date/Time    Sodium 132 (L) 09/01/2022 04:22 AM    Potassium 4.5 09/01/2022 04:22 AM    Chloride 98 09/01/2022 04:22 AM    CO2 27 09/01/2022 04:22 AM    BUN 12 09/01/2022 04:22 AM    Creatinine 0.58 (L) 09/01/2022 04:22 AM    Calcium 8.7 09/01/2022 04:22 AM    Magnesium 1.9 09/01/2022 04:22 AM    Phosphorus 4.3 09/01/2022 04:22 AM      Lab Results   Component Value Date/Time    Alk. phosphatase 128 (H) 08/27/2022 04:39 PM    Protein, total 6.4 09/01/2022 08:53 AM    Albumin 2.7 (L) 08/27/2022 04:39 PM    Globulin 5.0 (H) 08/27/2022 04:39 PM     Lab Results   Component Value Date/Time    INR 1.0 04/25/2022 12:51 AM    Prothrombin time 10.1 04/25/2022 12:51 AM    aPTT 35.4 (H) 08/28/2022 05:23 AM      Lab Results   Component Value Date/Time    Iron 34 (L) 08/28/2022 05:23 AM    TIBC 202 (L) 08/28/2022 05:23 AM    Iron % saturation 17 (L) 08/28/2022 05:23 AM    Ferritin 1,008 (H) 08/28/2022 05:29 AM      No results found for: PH, PCO2, PO2  No components found for: GLPOC   No results found for: CPK, CKMB             Total time:   Counseling / coordination time, spent as noted above:   > 50% counseling / coordination?:     Prolonged service was provided for  []30 min   []75 min in face to face time in the presence of the patient, spent as noted above.   Time Start: Time End:   Note: this can only be billed with 62962 (initial) or 81194 (follow up). If multiple start / stop times, list each separately.

## 2022-09-02 NOTE — PROGRESS NOTES
Pulmonary, Critical Care, and Sleep Medicine~Progress Note    Name: Dutch Yang MRN: [id number]   : 1960 Hospital: [address]   Date: 2022 11:42 AM Admission: 2022     Impression Plan   Right lung mass with hilar mass/lymphadenopathy. New since April CXR. Right pleural effusion with pleural masses. Nicotine dependence. NSTEMI  Dyspnea Bronchodilators  O2 titration above 90%   Would allow for effusion to drain. Can pull chest tube once effusion is less than 150ml output in 24 hour period. He may require a pleural catheter   Chest tube on gravity   PRN over the weekend      Interval History:      Chest tube in place  Around 1750 ml output since placement yesterday  Dyspnea stable   Effusion was lymphocytic exudate       Had RRT this am for chest pains   Stable right now       Finally agreeable to thoracentesis  Dyspneic today       S/p CT bx of left scapular lesion. Still declines thoracentesis, but is open to it maybe tomorrow       Says he feels terrible, has dyspnea and left shoulder pain. No new complaints. Pulmonary Consultation:    58year old female with past medical hx as given below who presented to [facility] with increased right shoulder pain. He has unintentional weight loss of 20 pounds in last 2 months. He has cough with thick sputum, he denies fever, chills, night sweats. He noted some occasional right sided chest pain and a lot of fatigue. Known smoker 1.5 ppd x > 20 years now smoking 1/2 ppd. No fhx of malignancy. CT Chest reviewed - Right side pleural effusion. Right anterior sub pleural mass. Right perihilar mass/ LN and pleural nodularity on the right with masses. Small nodules on the left side. Had CXR in April without any signs of mass. I have reviewed the labs and previous days notes. A comprehensive review of systems was negative except for that written in the HPI.   Past Medical History:   Diagnosis Date Arthritis     GERD (gastroesophageal reflux disease)       Past Surgical History:   Procedure Laterality Date    HX APPENDECTOMY      AS CHILD    HX ENDOSCOPY          HX VASCULAR STENT      X3 STENTS GROIN    HX WISDOM TEETH EXTRACTION      ALL TEETH REMOVED      Prior to Admission medications    Medication Sig Start Date End Date Taking? Authorizing Provider   aspirin delayed-release 81 mg tablet Take 81 mg by mouth every morning. Provider, Historical     No Known Allergies   Social History     Tobacco Use    Smoking status: Every Day     Packs/day: 0.50     Years: 40.00     Pack years: 20.00     Types: Cigarettes    Smokeless tobacco: Never   Substance Use Topics    Alcohol use:  Yes     Alcohol/week: 2.0 standard drinks     Types: 2 Cans of beer per week     Comment: DAILY      Family History   Problem Relation Age of Onset    Heart Disease Mother         HEART ATTACK     Emphysema Father     No Known Problems Sister     COPD Brother     Lung Disease Brother     Other Brother         MOTOR VEHICLE ACCIDENT    No Known Problems Daughter     No Known Problems Daughter     No Known Problems Daughter     No Known Problems Son     Anesth Problems Neg Hx      OBJECTIVE:     Vital Signs:     Visit Vitals  BP 99/70 (BP 1 Location: Left upper arm, BP Patient Position: At rest)   Pulse 100   Temp 98.5 °F (36.9 °C)   Resp 30   Ht 5' 8\" (1.727 m)   Wt 46.3 kg (102 lb)   SpO2 93%   BMI 15.51 kg/m²      Temp (24hrs), Av °F (36.7 °C), Min:97.7 °F (36.5 °C), Max:98.5 °F (36.9 °C)     Intake/Output:     Last shift: 701 - 1900  In: 90 [P.O.:90]  Out: 400 [Urine:400]    Last 3 shifts: 1901 -  0700  In: -   Out: 300 [Urine:300]        Intake/Output Summary (Last 24 hours) at 2022 1050  Last data filed at 2022 1016  Gross per 24 hour   Intake 90 ml   Output 400 ml   Net -310 ml         Physical Exam:                                        Exam Findings Other   General: No resp distress noted, appears stated age    [de-identified]:  No ulcers, JVD not elevated, no cervical LAD    Chest: No pectus deformity, normal chest rise b/l    HEART:  RRR, no murmurs/rubs/gallops    LUNGS: Decreased BS on the right, mild wheeze    ABD: Soft/NT, non rigid mildly distended    EXT: No cyanosis/clubbing/edema, normal peripheral pulses    Skin: No rashes or ulcers, no mottling    Neuro: A/O x 3        Medications:  Current Facility-Administered Medications   Medication Dose Route Frequency    albuterol-ipratropium (DUO-NEB) 2.5 MG-0.5 MG/3 ML  3 mL Nebulization BID RT    naloxone (NARCAN) injection 0.4 mg  0.4 mg IntraVENous EVERY 2 MINUTES AS NEEDED    oxyCODONE ER (OxyCONTIN) tablet 10 mg  10 mg Oral DAILY    oxyCODONE IR (ROXICODONE) tablet 5 mg  5 mg Oral Q3H PRN    oxyCODONE IR (ROXICODONE) tablet 10 mg  10 mg Oral Q3H PRN    pantoprazole (PROTONIX) tablet 40 mg  40 mg Oral ACB    acetaminophen (TYLENOL) tablet 650 mg  650 mg Oral TID    senna-docusate (PERICOLACE) 8.6-50 mg per tablet 2 Tablet  2 Tablet Oral DAILY    aspirin delayed-release tablet 81 mg  81 mg Oral 7am    sodium chloride (NS) flush 5-40 mL  5-40 mL IntraVENous Q8H    sodium chloride (NS) flush 5-40 mL  5-40 mL IntraVENous PRN    acetaminophen (TYLENOL) tablet 650 mg  650 mg Oral Q6H PRN    Or    acetaminophen (TYLENOL) suppository 650 mg  650 mg Rectal Q6H PRN    polyethylene glycol (MIRALAX) packet 17 g  17 g Oral DAILY PRN    ondansetron (ZOFRAN ODT) tablet 4 mg  4 mg Oral Q8H PRN    Or    ondansetron (ZOFRAN) injection 4 mg  4 mg IntraVENous Q6H PRN    L.acidophilus-paracasei-S.thermophil-bifidobacter (RISAQUAD) 8 billion cell capsule  1 Capsule Oral DAILY    metoprolol tartrate (LOPRESSOR) tablet 25 mg  25 mg Oral Q12H    enoxaparin (LOVENOX) injection 30 mg  30 mg SubCUTAneous Q24H    lactated Ringers infusion  100 mL/hr IntraVENous CONTINUOUS    nitroglycerin (NITROSTAT) tablet 0.4 mg  0.4 mg SubLINGual Q5MIN PRN    morphine injection 2 mg  2 mg IntraVENous Q4H PRN       Labs:  ABG No results for input(s): PHI, PCO2I, PO2I, HCO3I, SO2I, FIO2I in the last 72 hours.      CBC Recent Labs     09/01/22 0422 08/31/22  0540   WBC 19.0* 18.2*   HGB 9.4* 8.8*   HCT 28.1* 26.5*   * 469*   MCV 95.9 96.7   MCH 32.1 75.5          Metabolic  Panel Recent Labs     09/01/22 0422 08/31/22  0540   * 136   K 4.5 4.2   CL 98 102   CO2 27 26   * 98   BUN 12 12   CREA 0.58* 0.48*   CA 8.7 8.3*   MG 1.9 1.9   PHOS 4.3 3.9          Pertinent Labs                Shakeel Chiu PA-C  9/2/2022

## 2022-09-02 NOTE — HOSPICE
[facility]  [phone]     Patient Name: Tiff Reid  YOB: 1960  Age: 58 y.o. [address] RN Note:  Hospice consult received, reviewing chart. Will follow up with Unit Nurse and Care Manager to discuss plan of care, patient status and discharge disposition within the hour. Thank you for the opportunity to be of service to this patient.      Jerry Ordonez RN  Clinical Nurse Liaison   [address]  G)[phone] (i) [phone]

## 2022-09-02 NOTE — PROGRESS NOTES
Patient is off the floor for radiation. Will attempt back later today v beginning of next week as able. Encourage the patient to continue to participate in safe OOB activities and self-care. Recommend HHOT at [facility] with increased support depending on decision for hospice care or not.      Thank you  Gabo Cavanaugh MS OTR/L

## 2022-09-02 NOTE — PROGRESS NOTES
Patient received radiation treatment number 4 of 4 to the LT shoulder, LT pelvis, and RT iliac today.   A Zhong  RT(T)

## 2022-09-02 NOTE — PROGRESS NOTES
Problem: Mobility Impaired (Adult and Pediatric)  Goal: *Acute Goals and Plan of Care (Insert Text)  Description: FUNCTIONAL STATUS PRIOR TO ADMISSION: Patient was modified independent using a single point cane for functional mobility. HOME SUPPORT PRIOR TO ADMISSION: The patient lived with brother, for whom he was primary caregiver (brother is on hospice). Patient did not require assistance for mobility or ADLs until recent hospitalization for metastatic cancer. Physical Therapy Goals  Initiated 9/2/2022  1. Patient will move from supine to sit and sit to supine  in bed with modified independence within 7 day(s). 2.  Patient will transfer from bed to chair and chair to bed with modified independence using the least restrictive device within 7 day(s). 3.  Patient will perform sit to stand with modified independence within 7 day(s). 4.  Patient will ambulate with modified independence for 50 feet with the least restrictive device within 7 day(s). Outcome: Progressing Towards Goal   PHYSICAL THERAPY EVALUATION  Patient: Evaristo Casanova (31 y.o. male)  Date: 9/2/2022  Primary Diagnosis: NSTEMI (non-ST elevated myocardial infarction) [facility]) [I21.4]       Precautions:   Fall      ASSESSMENT  Based on the objective data described below, the patient presents with decreased activity tolerance, increased pain limiting mobility, generalized weakness, increased need for assistance, and high risk for falls in setting of hospitalization with diagnosis of lung cancer with bone mets. Patient overall SBA-CGA for all mobility, completing bed mobility with SBA, transfers with CGA, and ambulating 20 feet in room with CGA and demonstrating increased trunk sway but no LOB. Patient with stable SPO2 at rest and during activity on 6LPM supplemental O2, and BP stable as well. At this time, patient is unsure of mobility goals in setting of possibly transitioning to hospice/ comfort care.  At this time patient is agreeable to continuing PT interventions until he makes a decision. Per patient request, PT frequency 3x/week rather than recommended 5x/week to improve ability to participate. Patient educated on role of PT and that if his goals of care change, PT can be discontinued; patient endorsed understanding. Current Level of Function Impacting Discharge (mobility/balance): ambulates 20 feet with CGA    Functional Outcome Measure: The patient scored Total Score: 22/28 on the Tinetti outcome measure which is indicative of moderate fall risk. Other factors to consider for discharge: possibly transitioning to hospice; metastatic cancer      Patient will benefit from skilled therapy intervention to address the above noted impairments. PLAN :  Recommendations and Planned Interventions: bed mobility training, transfer training, gait training, therapeutic exercises, neuromuscular re-education, patient and family training/education, and therapeutic activities      Frequency/Duration: Patient will be followed by physical therapy:  3 times a week to address goals. Recommendation for discharge: (in order for the patient to meet his/her long term goals)  Therapy up to 5 days/week in SNF setting    This discharge recommendation:  A follow-up discussion with the attending provider and/or case management is planned    IF patient discharges home will need the following DME: to be determined (TBD); possibly trial RW         SUBJECTIVE:   Patient stated I don't know what my goals are.     OBJECTIVE DATA SUMMARY:   HISTORY:    Past Medical History:   Diagnosis Date    Arthritis     GERD (gastroesophageal reflux disease)      Past Surgical History:   Procedure Laterality Date    HX APPENDECTOMY      AS CHILD    HX ENDOSCOPY      1990's    HX VASCULAR STENT      X3 STENTS GROIN    HX WISDOM TEETH EXTRACTION      ALL TEETH REMOVED       Personal factors and/or comorbidities impacting plan of care: metastatic cancer    Home Situation  Home Environment: Apartment  # Steps to Enter: 21  Rails to Enter: Yes  Hand Rails : Bilateral  Wheelchair Ramp: No  One/Two Story Residence: One story  Lift Chair Available: No  Living Alone: No  Support Systems: Other Family Member(s) (brother)  Patient Expects to be Discharged to[de-identified] Home with hospice  Current DME Used/Available at Home: Walker, rolling  Tub or Shower Type: Tub/Shower combination    EXAMINATION/PRESENTATION/DECISION MAKING:   Critical Behavior:  Neurologic State: Alert  Orientation Level: Oriented X4  Cognition: Follows commands  Safety/Judgement: Fall prevention, Home safety  Hearing: Auditory  Auditory Impairment: None  Skin:  intact  Edema: none noted  Range Of Motion:  AROM: Generally decreased, functional           PROM: Within functional limits           Strength:    Strength: Generally decreased, functional                    Tone & Sensation:   Tone: Normal              Sensation: Intact               Coordination:  Coordination: Within functional limits  Vision:      Functional Mobility:  Bed Mobility:  Rolling: Stand-by assistance  Supine to Sit: Stand-by assistance  Sit to Supine: Stand-by assistance  Scooting: Stand-by assistance  Transfers:  Sit to Stand: Contact guard assistance  Stand to Sit: Stand-by assistance                       Balance:   Sitting: Intact; Without support  Standing: Impaired; Without support  Standing - Static: Good  Standing - Dynamic : Fair  Ambulation/Gait Training:  Distance (ft): 20 Feet (ft)  Assistive Device: Gait belt  Ambulation - Level of Assistance: Contact guard assistance        Gait Abnormalities: Trunk sway increased        Base of Support: Narrowed     Speed/Marine: Pace decreased (<100 feet/min)  Step Length: Right shortened;Left shortened                  Functional Measure:  Tinetti test:    Sitting Balance: 1  Arises: 1  Attempts to Rise: 2  Immediate Standing Balance: 2  Standing Balance: 2  Nudged: 1  Eyes Closed: 1  Turn 360 Degrees - Continuous/Discontinuous: 1  Turn 360 Degrees - Steady/Unsteady: 0  Sitting Down: 1  Balance Score: 12 Balance total score  Indication of Gait: 1  R Step Length/Height: 1  L Step Length/Height: 1  R Foot Clearance: 1  L Foot Clearance: 1  Step Symmetry: 1  Step Continuity: 1  Path: 1  Trunk: 1  Walking Time: 1  Gait Score: 10 Gait total score  Total Score: 22/28 Overall total score         Tinetti Tool Score Risk of Falls  <19 = High Fall Risk  19-24 = Moderate Fall Risk  25-28 = Low Fall Risk  Tinetti ME. Performance-Oriented Assessment of Mobility Problems in Elderly Patients. [facility] 66; [id number]. (Scoring Description: PT Bulletin Feb. 10, 1993)    Older adults: Tin Cervantes et al, 2009; n = 1000 [facility] elderly evaluated with ABC, HOLLY, ADL, and IADL)  · Mean HOLLY score for males aged 69-68 years = 26.21(3.40)  · Mean HOLLY score for females age 69-68 years = 25.16(4.30)  · Mean HOLLY score for males over 80 years = 23.29(6.02)  · Mean HOLLY score for females over 80 years = 17.20(8.32)        Physical Therapy Evaluation Charge Determination   History Examination Presentation Decision-Making   MEDIUM  Complexity : 1-2 comorbidities / personal factors will impact the outcome/ POC  LOW Complexity : 1-2 Standardized tests and measures addressing body structure, function, activity limitation and / or participation in recreation  MEDIUM Complexity : Evolving with changing characteristics  Other outcome measures Tinetti  HIGH       Based on the above components, the patient evaluation is determined to be of the following complexity level: LOW     Pain Ratin/10 whole body pain - RN aware     Activity Tolerance:   Good      After treatment patient left in no apparent distress:   Supine in bed, Call bell within reach, and Bed / chair alarm activated    COMMUNICATION/EDUCATION:   The patients plan of care was discussed with: Occupational therapist and Registered nurse.      Fall prevention education was provided and the patient/caregiver indicated understanding.     Thank you for this referral.  Song Stiles, PT   Time Calculation: 20 mins

## 2022-09-02 NOTE — PROGRESS NOTES
[facility] [facility] Adult  Hospitalist Group                                                                                          Hospitalist Progress Note  Nani Desai MD  Answering service: [phone] from in house phone        Date of Service:  2022  NAME:  Gomez Ward  :  1960  MRN:  [id number]      Admission Summary: This 49-year-old man with past medical history significant for peripheral artery disease, status post left femoral/peroneal bypass presented at the emergency room with left shoulder pain. This started about 3 weeks ago. The patient also complained of left-sided chest pain. It is not clear whether the left shoulder pain is as a result of radiation from the left-sided chest pain. The pain is constant sharp pain, 9/10 in severity, worse with breathing. No known relieving factors. The patient also complained of left leg pain. He stated that he has had left leg pain since after his surgery which was in April. The patient also stated that he has been losing weight which was unintentional.  Overall, the patient stated that he feels weak and it is becoming difficult to carry out activity of daily living.        Interval history / Subjective:     Patient remains about the same  Discussed palliative care and hospice currently plan is to go home with hospice and follow-up with oncology as an outpatient for immunotherapy       Assessment & Plan:     R lung mass with hilar LAD, and widely metastatic bony disease  Acute hypoxic respiratory failure  Pleural effusions - suspect malignant   - s/p IR guided biopsy of scapula   - Oncology and pulmonary consulted and following  - pain control with oxycodone, and added decadron as well   - Palliative care following   --Status postthoracentesis currently plan is to keep the thoracentesis drain in place over the weekend and then clamp it and send patient home with hospice on Monday      L Shoulder pain - secondary to bony metastasis likely (in glenoid, scapula)  - IV morphine   - Add PO oxycodone PRN   - Added decadron per palliative  - Rad Onc initiated palliative radiation to shoulder   - Palliative care following, appreciate assistance     Elevated troponin -   --Status post RRT troponin trending down  Given presence of malignancy and location suspect CP and shoulder pain related to above. - Discussed with Dr.Ro. SHELTON heparin gtt   - Echo 55-60%     PVD - s/p L fem-perineal bypass   - Contiue ASA    Hyponatremia - mild, monitor   Tobacco abuse - current smoker. Declined nicotine patch     Code status: DNR  Prophylaxis: add lovenox   Care Plan discussed with: pt, RN   Anticipated Disposition: > 48 hours, pending pain control and DC plan. Suspect will need to go home. PT/OT     Hospital Problems  Date Reviewed: 8/28/2022            Codes Class Noted POA    Severe protein-calorie malnutrition ([facility] [address] ICD-10-CM: A71  ICD-9-CM: 573  8/29/2022 Yes        * (Principal) NSTEMI (non-ST elevated myocardial infarction) [facility]) ICD-10-CM: I21.4  ICD-9-CM: 410.70  8/27/2022 Yes       Review of Systems:   A comprehensive review of systems was negative except for that written in the HPI. Vital Signs:    Last 24hrs VS reviewed since prior progress note. Most recent are:  Visit Vitals  /70 (BP 1 Location: Right upper arm, BP Patient Position: Semi fowlers; At rest)   Pulse (!) 107   Temp 98 °F (36.7 °C)   Resp 22   Ht 5' 8\" (1.727 m)   Wt 46.3 kg (102 lb)   SpO2 97%   BMI 15.51 kg/m²         Intake/Output Summary (Last 24 hours) at 9/2/2022 1647  Last data filed at 9/2/2022 1016  Gross per 24 hour   Intake 90 ml   Output 400 ml   Net -310 ml          Physical Examination:     I had a face to face encounter with this patient and independently examined them on 9/2/2022 as outlined below:          Constitutional:  No acute distress, cooperative, pleasant, chronically ill appearing, cachectic    ENT:  Oral mucosa moist, oropharynx benign. Resp:  Course bilaterally, decreased at bases R>L. No wheezing/rhonchi/rales. No accessory muscle use. CV:  Regular rhythm, normal rate, no murmurs, gallops, rubs     GI:  Soft, non distended, non tender. normoactive bowel sounds, no hepatosplenomegaly     Musculoskeletal:  No edema, warm, 2+ pulses throughout     Neurologic:  Moves all extremities. AAOx3, CN II-XII reviewed            Data Review:    Review and/or order of clinical lab test  Review and/or order of tests in the radiology section of CPT  Review and/or order of tests in the medicine section of CPT      Labs:     Recent Labs     09/01/22 0422 08/31/22  0540   WBC 19.0* 18.2*   HGB 9.4* 8.8*   HCT 28.1* 26.5*   * 469*       Recent Labs     09/01/22 0422 08/31/22  0540   * 136   K 4.5 4.2   CL 98 102   CO2 27 26   BUN 12 12   CREA 0.58* 0.48*   * 98   CA 8.7 8.3*   MG 1.9 1.9   PHOS 4.3 3.9       Recent Labs     09/01/22  0853   TP 6.4       No results for input(s): INR, PTP, APTT, INREXT, INREXT in the last 72 hours. No results for input(s): FE, TIBC, PSAT, FERR in the last 72 hours. Lab Results   Component Value Date/Time    Folate 11.1 08/28/2022 05:23 AM        No results for input(s): PH, PCO2, PO2 in the last 72 hours. No results for input(s): CPK, CKNDX, TROIQ in the last 72 hours.     No lab exists for component: CPKMB  No results found for: CHOL, CHOLX, CHLST, CHOLV, HDL, HDLP, LDL, LDLC, DLDLP, TGLX, TRIGL, TRIGP, CHHD, CHHDX  No results found for: GLUCPOC  No results found for: COLOR, APPRN, SPGRU, REFSG, IRINA, PROTU, GLUCU, KETU, BILU, UROU, ROLANDO, LEUKU, GLUKE, EPSU, BACTU, WBCU, RBCU, CASTS, UCRY      Medications Reviewed:     Current Facility-Administered Medications   Medication Dose Route Frequency    albuterol-ipratropium (DUO-NEB) 2.5 MG-0.5 MG/3 ML  3 mL Nebulization BID RT    naloxone (NARCAN) injection 0.4 mg  0.4 mg IntraVENous EVERY 2 MINUTES AS NEEDED    oxyCODONE ER (OxyCONTIN) tablet 10 mg  10 mg Oral DAILY    oxyCODONE IR (ROXICODONE) tablet 5 mg  5 mg Oral Q3H PRN    oxyCODONE IR (ROXICODONE) tablet 10 mg  10 mg Oral Q3H PRN    pantoprazole (PROTONIX) tablet 40 mg  40 mg Oral ACB    acetaminophen (TYLENOL) tablet 650 mg  650 mg Oral TID    senna-docusate (PERICOLACE) 8.6-50 mg per tablet 2 Tablet  2 Tablet Oral DAILY    aspirin delayed-release tablet 81 mg  81 mg Oral 7am    sodium chloride (NS) flush 5-40 mL  5-40 mL IntraVENous Q8H    sodium chloride (NS) flush 5-40 mL  5-40 mL IntraVENous PRN    acetaminophen (TYLENOL) tablet 650 mg  650 mg Oral Q6H PRN    Or    acetaminophen (TYLENOL) suppository 650 mg  650 mg Rectal Q6H PRN    polyethylene glycol (MIRALAX) packet 17 g  17 g Oral DAILY PRN    ondansetron (ZOFRAN ODT) tablet 4 mg  4 mg Oral Q8H PRN    Or    ondansetron (ZOFRAN) injection 4 mg  4 mg IntraVENous Q6H PRN    L.acidophilus-paracasei-S.thermophil-bifidobacter (RISAQUAD) 8 billion cell capsule  1 Capsule Oral DAILY    metoprolol tartrate (LOPRESSOR) tablet 25 mg  25 mg Oral Q12H    enoxaparin (LOVENOX) injection 30 mg  30 mg SubCUTAneous Q24H    lactated Ringers infusion  100 mL/hr IntraVENous CONTINUOUS    nitroglycerin (NITROSTAT) tablet 0.4 mg  0.4 mg SubLINGual Q5MIN PRN    morphine injection 2 mg  2 mg IntraVENous Q4H PRN     ______________________________________________________________________  EXPECTED LENGTH OF STAY: 3d 16h  ACTUAL LENGTH OF STAY:          6                 Vicky Farrell MD

## 2022-09-03 NOTE — HOSPICE
[facility]  [phone]     Patient Name: Jesus Dominguez  YOB: 1960  Age: 58 y.o. [facility] RN Note: Bedside assessment. Patient states he was up all night with nausea and vomiting. Complaining of pain and acid reflux. He received IV zofran which helped some. He stated the oxycodone makes him nauseated and that he had gotten some IV morphine before which helped with his pain and shortness of breath. Bedside nurse will give a dose of IV morphine now. Plan is to have chest tube clamped later today to see if pleural drain can be placed tomorrow with plan to go home on Monday. Patient is at risk for rapid decline. Hospice to follow daily for change in condition that may warrant GIP admission. Patient signed hospice consents with plan to admit at home on Monday. He will be going to live with his daughter, Merly Juan. DME will be ordered for delivery Monday 8-10am:   [place]: hospital bed, gel overlay, OBT, oxygen conc  SRX will be ordered tomorrow for home delivery Monday from [facility] once is evaluated as stable to go home. Thank you for the opportunity to be of service to this patient.      Jen Avila RN  Clinical Nurse Liaison   [facility]  [phone]) [phone]

## 2022-09-03 NOTE — HOSPICE
[facility]  [phone]     Patient Name: Milton Spencer  YOB: 1960  Age: 58 y.o. [address] RN Note:  Call placed to IR to see if they can place Aspira drain tomorrow, Sunday as Monday is Holiday. They stated that they do not place drains over the weekend or on a holiday. Plan now will be to pull chest tube prior to going home. If he should become uncomfortable at home with fluid build up, hospice would send him back to [facility] for outpatient pleural drain. Thank you for the opportunity to be of service to this patient.      Saud Dowell RN  Clinical Nurse Liaison   [address]  [phone] (f) [phone]

## 2022-09-03 NOTE — PROGRESS NOTES
Progress Note      Pt Name  Ghazala Marquez   Date of Birth 1960   Medical Record Number  [id number]      Age  58 y.o. PCP None   Admit date:  8/27/2022    Room Number  547/02  @ [facility]   Date of Service  9/3/2022     Admission Diagnoses:  NSTEMI (non-ST elevated myocardial infarction) [facility])     Admission Summary:  \" This 60-year-old man with past medical history significant for peripheral artery disease, status post left femoral/peroneal bypass presented at the emergency room with left shoulder pain. This started about 3 weeks ago. The patient also complained of left-sided chest pain. It is not clear whether the left shoulder pain is as a result of radiation from the left-sided chest pain. The pain is constant sharp pain, 9/10 in severity, worse with breathing. No known relieving factors. The patient also complained of left leg pain. He stated that he has had left leg pain since after his surgery which was in April. The patient also stated that he has been losing weight which was unintentional.  Overall, the patient stated that he feels weak and it is becoming difficult to carry out activity of daily living.  \"     Assessment and plan:     R lung mass with hilar LAD, and widely metastatic bony disease  Acute hypoxic respiratory failure  Pleural effusions - suspect malignant   - s/p IR guided biopsy of scapula 8/29  - Oncology and pulmonary consulted and following  - pain control with Oxycodone, and decadron    -Status postthoracentesis currently plan is to keep the thoracentesis drain in place over the weekend and then clamp it and send patient home with hospice on Monday      LEFT Shoulder pain - secondary to bony metastasis likely (in glenoid, scapula)  - IV Morphine   - PO oxycodone PRN   - Added Decadron per palliative  - Rad Onc initiated palliative radiation to shoulder      NSTEMI -   -Heparin gtt was stopped   -Echo 55-60%   -Conservative Rx given the advanced neoplastic process PVD - s/p L fem-perineal bypass   - Contiue ASA     Hyponatremia - mild, monitor   Tobacco abuse  disorder                CODE STATUS   DNR    Functional Status  Unclear - pt resides with his daughter in [address]     Surrogate decision maker:  Pt's daughter      Prophylaxis   Lovenox   Discharge Plan:  Home with home hospice when family is ready to accept him  ,    Misc   Benefit:  Payor: BLUE CROSS / Plan: Kennedy Duckworth / Product Type: HMO /    Isolation :  There are currently no Active Isolations   ADT status:  INPATIENT      Query   None noted today     Prognosis   Guarded    Social issues  Date  Comment     9/3/22  18:37 PM No family or visitor here with the patient today                     Subjective Data     \"I feel tired \"  Review of Systems - History obtained from the patient  General ROS: positive for  - fatigue and weight loss  Respiratory ROS: positive for - cough and shortness of breath  Cardiovascular ROS: no chest pain or dyspnea on exertion    Objective Data       Comments  Emaciated chronically ill appearing man lying in bed in no distress        Patient Vitals for the past 24 hrs:   BP   09/03/22 1434 96/65   09/03/22 1305 100/67   09/03/22 1258 102/69   09/03/22 0757 97/67   09/03/22 0307 103/69   09/02/22 2005 101/70      Patient Vitals for the past 24 hrs:   Pulse   09/03/22 1434 99   09/03/22 1305 97   09/03/22 1258 (!) 101   09/03/22 0757 (!) 109   09/03/22 0307 98   09/02/22 2005 (!) 101      Patient Vitals for the past 24 hrs:   Resp   09/03/22 1434 18   09/03/22 0757 19   09/03/22 0307 18   09/02/22 2005 20      Patient Vitals for the past 24 hrs:   Temp   09/03/22 1434 97.6 °F (36.4 °C)   09/03/22 0757 98.3 °F (36.8 °C)   09/03/22 0307 98.1 °F (36.7 °C)   09/02/22 2005 97.9 °F (36.6 °C)        SpO2 Readings from Last 6 Encounters:   09/03/22 96%   05/01/22 95%   05/12/21 99%   03/25/21 98%   03/15/21 98%       O2 Flow Rate (L/min): 4 l/min  O2 Device: Nasal cannula Body mass index is 15.51 kg/m². -  Wt Readings from Last 10 Encounters:   08/29/22 46.3 kg (102 lb)   04/28/22 52.4 kg (115 lb 8.3 oz)   04/25/22 52.4 kg (115 lb 8.3 oz)   05/12/21 54.4 kg (120 lb)   03/25/21 54.4 kg (120 lb)   03/15/21 54.4 kg (120 lb)        Intake/Output Summary (Last 24 hours) at 9/3/2022 1725  Last data filed at 9/3/2022 0539  Gross per 24 hour   Intake --   Output 500 ml   Net -500 ml         Physical Exam:             General:  Alert, cooperative,   MAL  noursished,   well developed,   appears stated age    Ears/Eyes:  Hearing intact  Sclera anicteric. Pupils equal   Mouth/Throat:  Mucous membranes normal pink and moist     Neck:     Lungs:  Chest excursion symmetrical   Auscultation B/L Symmetrical   Chest tube with serosanguinous drain noted. CVS:  Regular rate and rhythm   no  murmur,   No click, rub or gallop  S1 normal   S2 normal   Pedal pulses  b/l symmetrical   Abdomen:    Soft, non-tender  Bowel sounds normal  No distension      Extremities:  No cyanosis, jaundice  No edema noted   .     Skin:  Skin color, texture, turgor normal. no acute rash or lesions    Lymph nodes:     Musculoskeletal Muscle bulk B/L symmetrical   Neuro Cranial nerves are intact,   motor movement b/l symmetrical,   Sensory evaluation b/l symmetrical    Psych:  Alert and oriented,   normal mood & affect          Medications reviewed     Current Facility-Administered Medications   Medication Dose Route Frequency    albuterol-ipratropium (DUO-NEB) 2.5 MG-0.5 MG/3 ML  3 mL Nebulization BID RT    naloxone (NARCAN) injection 0.4 mg  0.4 mg IntraVENous EVERY 2 MINUTES AS NEEDED    oxyCODONE ER (OxyCONTIN) tablet 10 mg  10 mg Oral DAILY    oxyCODONE IR (ROXICODONE) tablet 5 mg  5 mg Oral Q3H PRN    oxyCODONE IR (ROXICODONE) tablet 10 mg  10 mg Oral Q3H PRN    pantoprazole (PROTONIX) tablet 40 mg  40 mg Oral ACB    acetaminophen (TYLENOL) tablet 650 mg  650 mg Oral TID    senna-docusate (PERICOLACE) 8.6-50 mg per tablet 2 Tablet  2 Tablet Oral DAILY    aspirin delayed-release tablet 81 mg  81 mg Oral 7am    sodium chloride (NS) flush 5-40 mL  5-40 mL IntraVENous Q8H    sodium chloride (NS) flush 5-40 mL  5-40 mL IntraVENous PRN    acetaminophen (TYLENOL) tablet 650 mg  650 mg Oral Q6H PRN    Or    acetaminophen (TYLENOL) suppository 650 mg  650 mg Rectal Q6H PRN    polyethylene glycol (MIRALAX) packet 17 g  17 g Oral DAILY PRN    ondansetron (ZOFRAN ODT) tablet 4 mg  4 mg Oral Q8H PRN    Or    ondansetron (ZOFRAN) injection 4 mg  4 mg IntraVENous Q6H PRN    L.acidophilus-paracasei-S.thermophil-bifidobacter (RISAQUAD) 8 billion cell capsule  1 Capsule Oral DAILY    metoprolol tartrate (LOPRESSOR) tablet 25 mg  25 mg Oral Q12H    enoxaparin (LOVENOX) injection 30 mg  30 mg SubCUTAneous Q24H    [Held by provider] lactated Ringers infusion  100 mL/hr IntraVENous CONTINUOUS    nitroglycerin (NITROSTAT) tablet 0.4 mg  0.4 mg SubLINGual Q5MIN PRN    morphine injection 2 mg  2 mg IntraVENous Q4H PRN       Relevant other informations: Other medical conditions listed in [facility] problem list section; all of these and other pertinent data were taken into consideration when treatment plan is developed and customized to this patient's unique overall circumstances and needs. We have reviewed available old medical records within the constraints of this admission process.         Data Review:   Recent Days:  All Micro Results       Procedure Component Value Units Date/Time    CULTURE, BODY FLUID Ardean Sayer STAIN [[id number]] Collected: 09/01/22 1212    Order Status: Completed Specimen: Pleural Fluid Updated: 09/02/22 1412     Special Requests: NO SPECIAL REQUESTS        GRAM STAIN OCCASIONAL WBCS SEEN         NO ORGANISMS SEEN        Culture result:       Culture performed on Unspun Fluid            NO GROWTH THUS FAR       COVID-19 RAPID TEST [[id number]] Collected: 08/28/22 0348    Order Status: Completed Specimen: Nasopharyngeal Updated: 08/28/22 0433     Specimen source Nasopharyngeal        COVID-19 rapid test Not detected        Comment: Rapid Abbott ID Now       Rapid NAAT:  The specimen is NEGATIVE for SARS-CoV-2, the novel coronavirus associated with COVID-19. Negative results should be treated as presumptive and, if inconsistent with clinical signs and symptoms or necessary for patient management, should be tested with an alternative molecular assay. Negative results do not preclude SARS-CoV-2 infection and should not be used as the sole basis for patient management decisions. This test has been authorized by the FDA under an Emergency Use Authorization (EUA) for use by authorized laboratories. Fact sheet for Healthcare Providers: ConventionUpdate.co.nz  Fact sheet for Patients: ConventionUpdate.co.nz       Methodology: Isothermal Nucleic Acid Amplification                 Recent Labs     09/01/22 0422   WBC 19.0*   HGB 9.4*   HCT 28.1*   *     Recent Labs     09/01/22 0422   *   K 4.5   CL 98   CO2 27   *   BUN 12   CREA 0.58*   CA 8.7   MG 1.9   PHOS 4.3      Lab Results   Component Value Date/Time    TSH 1.59 08/28/2022 05:23 AM            Care Plan discussed with:Patient/Family and Nurse   Other medical conditions are listed in the active hospital problem list section; these and other pertinent data were taken into consideration when the treatment plan was developed and customized to this patient's unique overall circumstances and needs. High complexity decision making was performed for this patient who is at high risk for decompensation with multiple organ involvement. Today total floor/unit time was 35 minutes while caring for this patient and greater than 50% of that time was spent with patient (and/or family) coordinating patients clinical issues; this includes time spent during multidisciplinary rounds.         Jay Mckenna MD MPH FACP [facility]  9/3/2022

## 2022-09-04 NOTE — HOSPICE
[facility]  [phone]     Patient Name: Gracelyn Oppenheim  YOB: 1960  Age: 58 y.o. [address] RN Note:  Met w/ daughter, Jovanni Martin at bedside to finalize plans for DC home w/ hospice. Per Jovanni Martin, she is no longer agreeable to taking him home w/o additional caregiver support. She feels she will not be able to provide the care he requires due to work obligations and would like to hear what other options she has. Reviewed what home hospice can provide however daughter does not feel like it will be enough. Does not have funds for LTC placement or in home caregivers unless he qualifies for Medicaid. Patient is alert and oriented this afternoon, breakfast tray at bedside and does not appear to be in any distress. Does not meet criteria for  or [facility] admission at this time. Will reach out to request CM  meet w/ daughter and discuss options for care. Home hospice admission on hold at this time  Medication and Equipment delivery on hold  Hospice intake team notified     Thank you for the opportunity to be of service to this patient.

## 2022-09-04 NOTE — PROGRESS NOTES
Problem: Gas Exchange - Impaired  Goal: *Absence of hypoxia  Outcome: Not Progressing Towards Goal     Problem: Patient Education: Go to Patient Education Activity  Goal: Patient/Family Education  Outcome: Progressing Towards Goal     Problem: Pressure Injury - Risk of  Goal: *Prevention of pressure injury  Description: Document Vipul Scale and appropriate interventions in the flowsheet.   Outcome: Progressing Towards Goal  Note: Pressure Injury Interventions:  Sensory Interventions: Minimize linen layers, Keep linens dry and wrinkle-free         Activity Interventions: Assess need for specialty bed    Mobility Interventions: Pressure redistribution bed/mattress (bed type)    Nutrition Interventions: Offer support with meals,snacks and hydration    Friction and Shear Interventions: Minimize layers

## 2022-09-04 NOTE — PROGRESS NOTES
Progress Note      Pt Name  Woo English   Date of Birth 1960   Medical Record Number  [id number]      Age  58 y.o. PCP None   Admit date:  8/27/2022    Room Number  547/02  @ [facility]   Date of Service  9/4/2022     Admission Diagnoses:  NSTEMI (non-ST elevated myocardial infarction) [facility])     Admission Summary:  \" This 57-year-old man with past medical history significant for peripheral artery disease, status post left femoral/peroneal bypass presented at the emergency room with left shoulder pain. This started about 3 weeks ago. The patient also complained of left-sided chest pain. It is not clear whether the left shoulder pain is as a result of radiation from the left-sided chest pain. The pain is constant sharp pain, 9/10 in severity, worse with breathing. No known relieving factors. The patient also complained of left leg pain. He stated that he has had left leg pain since after his surgery which was in April. The patient also stated that he has been losing weight which was unintentional.  Overall, the patient stated that he feels weak and it is becoming difficult to carry out activity of daily living.  \"     Assessment and plan:     R lung mass with hilar LAD, and widely metastatic bony disease  Acute hypoxic respiratory failure  Pleural effusions - suspect malignant   - s/p IR guided biopsy of scapula 8/29  - Oncology and pulmonary consulted and following  - pain control with Oxycodone, and decadron    -Status postthoracentesis currently plan is to keep the thoracentesis drain in place      LEFT Shoulder pain - secondary to bony metastasis likely (in glenoid, scapula)  - IV Morphine   - PO oxycodone PRN   - Added Decadron per palliative  - Rad Onc initiated palliative radiation to shoulder      NSTEMI -   -Heparin gtt was stopped   -Echo 55-60%   -Conservative Rx given the advanced neoplastic process      PVD - s/p L fem-perineal bypass   - Contiue ASA     Hyponatremia - mild, monitor   Tobacco abuse  disorder                CODE STATUS   DNR    Functional Status  Unclear - pt resides with his daughter in [facility]     Surrogate decision maker:  Pt's daughter      Prophylaxis   Lovenox   Discharge Plan:  The plan for discharge to home is scratched, his daughter is unable to care for him. [facility] try [facility] ,    [facility]   Benefit:  Payor: Viktoria  / Plan: [employer] / Product Type: HMO /    Isolation :  There are currently no Active Isolations   ADT status:  INPATIENT      Query   None noted today     Prognosis   Guarded    Social issues  Date  Comment     9/3/22  18:37 PM No family or visitor here with the patient today      9/4/22  4:24 PM No family or visitor here with the patient today                 Subjective Data     \"I want to go home \"      Objective Data       Comments  Emaciated chronically ill appearing man lying in bed in no distress        Patient Vitals for the past 24 hrs:   BP   09/04/22 0757 105/66   09/04/22 0400 100/67   09/03/22 2049 98/62        Patient Vitals for the past 24 hrs:   Pulse   09/04/22 0757 (!) 113   09/04/22 0400 (!) 109   09/03/22 2049 (!) 102        Patient Vitals for the past 24 hrs:   Resp   09/04/22 0757 17   09/04/22 0400 16   09/03/22 2049 18        Patient Vitals for the past 24 hrs:   Temp   09/04/22 0757 97.8 °F (36.6 °C)   09/04/22 0400 97.6 °F (36.4 °C)   09/03/22 2049 97.6 °F (36.4 °C)          SpO2 Readings from Last 6 Encounters:   09/04/22 94%   05/01/22 95%   05/12/21 99%   03/25/21 98%   03/15/21 98%       O2 Flow Rate (L/min): 6 l/min  O2 Device: Nasal cannula Body mass index is 15.51 kg/m².  -  Wt Readings from Last 10 Encounters:   08/29/22 46.3 kg (102 lb)   04/28/22 52.4 kg (115 lb 8.3 oz)   04/25/22 52.4 kg (115 lb 8.3 oz)   05/12/21 54.4 kg (120 lb)   03/25/21 54.4 kg (120 lb)   03/15/21 54.4 kg (120 lb)      No intake or output data in the 24 hours ending 09/04/22 3298        Physical Exam: General:  Alert, cooperative,   MAL  noursished,   well developed,   appears stated age    Ears/Eyes:  Hearing intact  Sclera anicteric. Pupils equal   Mouth/Throat:  Mucous membranes normal pink and moist     Neck:     Lungs:  Chest excursion symmetrical   Auscultation B/L Symmetrical   Chest tube with serosanguinous drain noted. CVS:  Regular rate and rhythm   no  murmur,   No click, rub or gallop  S1 normal   S2 normal   Pedal pulses  b/l symmetrical   Abdomen:    Soft, non-tender  Bowel sounds normal  No distension      Extremities:  No cyanosis, jaundice  No edema noted   .     Skin:  Skin color, texture, turgor normal. no acute rash or lesions    Lymph nodes:     Musculoskeletal Muscle bulk B/L symmetrical   Neuro    Psych:  Alert and oriented,   normal mood & affect          Medications reviewed     Current Facility-Administered Medications   Medication Dose Route Frequency    albuterol-ipratropium (DUO-NEB) 2.5 MG-0.5 MG/3 ML  3 mL Nebulization BID RT    naloxone (NARCAN) injection 0.4 mg  0.4 mg IntraVENous EVERY 2 MINUTES AS NEEDED    oxyCODONE ER (OxyCONTIN) tablet 10 mg  10 mg Oral DAILY    oxyCODONE IR (ROXICODONE) tablet 5 mg  5 mg Oral Q3H PRN    oxyCODONE IR (ROXICODONE) tablet 10 mg  10 mg Oral Q3H PRN    pantoprazole (PROTONIX) tablet 40 mg  40 mg Oral ACB    acetaminophen (TYLENOL) tablet 650 mg  650 mg Oral TID    senna-docusate (PERICOLACE) 8.6-50 mg per tablet 2 Tablet  2 Tablet Oral DAILY    aspirin delayed-release tablet 81 mg  81 mg Oral 7am    sodium chloride (NS) flush 5-40 mL  5-40 mL IntraVENous Q8H    sodium chloride (NS) flush 5-40 mL  5-40 mL IntraVENous PRN    acetaminophen (TYLENOL) tablet 650 mg  650 mg Oral Q6H PRN    Or    acetaminophen (TYLENOL) suppository 650 mg  650 mg Rectal Q6H PRN    polyethylene glycol (MIRALAX) packet 17 g  17 g Oral DAILY PRN    ondansetron (ZOFRAN ODT) tablet 4 mg  4 mg Oral Q8H PRN    Or    ondansetron (ZOFRAN) injection 4 mg  4 mg IntraVENous Q6H PRN    L.acidophilus-paracasei-S.thermophil-bifidobacter (RISAQUAD) 8 billion cell capsule  1 Capsule Oral DAILY    metoprolol tartrate (LOPRESSOR) tablet 25 mg  25 mg Oral Q12H    enoxaparin (LOVENOX) injection 30 mg  30 mg SubCUTAneous Q24H    [Held by provider] lactated Ringers infusion  100 mL/hr IntraVENous CONTINUOUS    nitroglycerin (NITROSTAT) tablet 0.4 mg  0.4 mg SubLINGual Q5MIN PRN    morphine injection 2 mg  2 mg IntraVENous Q4H PRN       Relevant other informations: Other medical conditions listed in [facility] problem list section; all of these and other pertinent data were taken into consideration when treatment plan is developed and customized to this patient's unique overall circumstances and needs. We have reviewed available old medical records within the constraints of this admission process. Data Review:   Recent Days:  All Micro Results       Procedure Component Value Units Date/Time    CULTURE, BODY FLUID Gordon Sprout STAIN [[id number]] Collected: 09/01/22 1212    Order Status: Completed Specimen: Pleural Fluid Updated: 09/02/22 1412     Special Requests: NO SPECIAL REQUESTS        GRAM STAIN OCCASIONAL WBCS SEEN         NO ORGANISMS SEEN        Culture result:       Culture performed on Unspun Fluid            NO GROWTH THUS FAR       COVID-19 RAPID TEST [[id number]] Collected: 08/28/22 0345    Order Status: Completed Specimen: Nasopharyngeal Updated: 08/28/22 0433     Specimen source Nasopharyngeal        COVID-19 rapid test Not detected        Comment: Rapid Abbott ID Now       Rapid NAAT:  The specimen is NEGATIVE for SARS-CoV-2, the novel coronavirus associated with COVID-19. Negative results should be treated as presumptive and, if inconsistent with clinical signs and symptoms or necessary for patient management, should be tested with an alternative molecular assay.   Negative results do not preclude SARS-CoV-2 infection and should not be used as the sole basis for patient management decisions. This test has been authorized by the FDA under an Emergency Use Authorization (EUA) for use by authorized laboratories. Fact sheet for Healthcare Providers: ConventionUpdate.co.nz  Fact sheet for Patients: ConventionUpdate.co.nz       Methodology: Isothermal Nucleic Acid Amplification                 No results for input(s): WBC, HGB, HCT, PLT, HGBEXT, HCTEXT, PLTEXT, HGBEXT, HCTEXT, PLTEXT in the last 72 hours. No results for input(s): NA, K, CL, CO2, GLU, BUN, CREA, CA, MG, PHOS, ALB, TBIL, TBILI, ALT, INR, INREXT, INREXT in the last 72 hours. No lab exists for component: SGOT     Lab Results   Component Value Date/Time    TSH 1.59 08/28/2022 05:23 AM            Care Plan discussed with:Patient/Family and Nurse   Other medical conditions are listed in the active hospital problem list section; these and other pertinent data were taken into consideration when the treatment plan was developed and customized to this patient's unique overall circumstances and needs. High complexity decision making was performed for this patient who is at high risk for decompensation with multiple organ involvement. Today total floor/unit time was 25 minutes while caring for this patient and greater than 50% of that time was spent with patient (and/or family) coordinating patients clinical issues; this includes time spent during multidisciplinary rounds.         Rocael Acuna MD MPH [facility]  9/4/2022

## 2022-09-04 NOTE — PROGRESS NOTES
MARISSA - Call received from Aylin Solis RN [facility] informed daughter Sophronia Dubin [phone] is no longer able to take patient home will need additional caregiver support. This writer attempted to reach daughter at bedside calls placed X2 and VMs left (outcome pending). -    1550 Call received from Sophronia Dubin - introduced to role of CM. Daughter states found out last week about medical status and this is way above abilities to provide care. Cat Gordillo has a 70lb dog who likes to jump. Renate frank is not mentally prepared to accept her father. Cat Gordillo is moving in 30-60 days. Informed by daughter Fatou Martin had someone assisting w/ finances. CM noted Jazmin, LCSW and patient had started FAP application and appointment w/ Medicare and SS/ Unemployment in October. Form left for liaison for [facility] ( this writer uncertain of application completion/document needed). This writer discussed current options home w/ hospice and hired help or nursing facility (paying room and board). Cat Gordillo states \" he does not have resources, this is poor customer service, and miscommunication. \"     Per daughter medications arrived at the home today at  1500 and refused delivery. Cat Gordillo informed this writer of several family members w/ cancer. Daughter states \" he wants to be released and just end it. \" Emotional support offered. Informed will communicate current needs and will request medical communication back to her w/in 24 hours. CM will continue to follow.

## 2022-09-05 NOTE — PROGRESS NOTES
Problem: Pressure Injury - Risk of  Goal: *Prevention of pressure injury  Description: Document Vipul Scale and appropriate interventions in the flowsheet. Outcome: Progressing Towards Goal  Note: Pressure Injury Interventions:  Sensory Interventions: Pressure redistribution bed/mattress (bed type), Minimize linen layers, Maintain/enhance activity level, Keep linens dry and wrinkle-free (refuses floating heels)         Activity Interventions: PT/OT evaluation, Pressure redistribution bed/mattress(bed type)    Mobility Interventions: PT/OT evaluation, Pressure redistribution bed/mattress (bed type)    Nutrition Interventions: Offer support with meals,snacks and hydration    Friction and Shear Interventions: Lift sheet, Minimize layers                Problem: Falls - Risk of  Goal: *Absence of Falls  Description: Document Ijeoma Fall Risk and appropriate interventions in the flowsheet.   Outcome: Progressing Towards Goal  Note: Fall Risk Interventions:  Mobility Interventions: Communicate number of staff needed for ambulation/transfer, Patient to call before getting OOB         Medication Interventions: Patient to call before getting OOB, Teach patient to arise slowly    Elimination Interventions: Call light in reach, Toilet paper/wipes in reach              Problem: Discharge Planning  Goal: *Discharge to safe environment  Outcome: Progressing Towards Goal  Goal: *Knowledge of medication management  Outcome: Progressing Towards Goal

## 2022-09-05 NOTE — PROGRESS NOTES
Transition of Care Plan  RUR: 15%  DISPOSITION: The disposition plan is home with hospice; referral sent to [id number] awaiting response   F/U with PCP/Specialist    Transport: [facility]    Transition of Care Plan:     The Plan for Transition of Care is related to the following treatment goals: Hospice     The Patient was provided with a choice of provider and agrees  with the discharge plan. Yes [x] No []    A Freedom of choice list was provided with basic dialogue that supports the patient's individualized plan of care/goals and shares the quality data associated with the providers.        Yes [x] No []      10:21 AM  SATURNINO Lawson

## 2022-09-05 NOTE — PROGRESS NOTES
[facility]  Hospitalist Group                                                                                          Hospitalist Progress Note  Humza Dotson MD  Answering service: [phone] from in house phone        Date of Service:  2022  NAME:  Evaristo Casanova  :  1960  MRN:  [id number]      Admission Summary: This 51-year-old man with past medical history significant for peripheral artery disease, status post left femoral/peroneal bypass presented at the emergency room with left shoulder pain. This started about 3 weeks ago. The patient also complained of left-sided chest pain. It is not clear whether the left shoulder pain is as a result of radiation from the left-sided chest pain. The pain is constant sharp pain, 9/10 in severity, worse with breathing. No known relieving factors. The patient also complained of left leg pain. He stated that he has had left leg pain since after his surgery which was in April. The patient also stated that he has been losing weight which was unintentional.  Overall, the patient stated that he feels weak and it is becoming difficult to carry out activity of daily living.        Interval history / Subjective:     Patient remains about the same  Awaiting dispo       Assessment & Plan:     R lung mass with hilar LAD, and widely metastatic bony disease  Acute hypoxic respiratory failure  Pleural effusions - suspect malignant   - s/p IR guided biopsy of scapula   - Oncology and pulmonary consulted and following  - pain control with Oxycodone, and decadron    - Status postthoracentesis currently plan is to keep the thoracentesis drain in place      LEFT Shoulder pain - secondary to bony metastasis likely (in glenoid, scapula)  - IV Morphine   - PO oxycodone PRN   - Added Decadron per palliative  - Rad Onc initiated palliative radiation to shoulder      NSTEMI -   -Heparin gtt was stopped   -Echo 55-60%   -Conservative Rx given the advanced neoplastic process      PVD - s/p L fem-perineal bypass   - Contiue ASA     Hyponatremia - mild, monitor   Tobacco abuse  disorder       Significant dispo problem due to no source of pain event and daughter does not want to take the patient home    Code status: DNR  Prophylaxis: add lovenox   Care Plan discussed with: pt, RN   Anticipated Disposition: > 48 hours, pending pain control and DC plan. Suspect will need to go home. PT/OT     Hospital Problems  Date Reviewed: 8/28/2022            Codes Class Noted POA    Severe protein-calorie malnutrition ([facility] [address] ICD-10-CM: C34  ICD-9-CM: 910  8/29/2022 Yes        * (Principal) NSTEMI (non-ST elevated myocardial infarction) [facility]) ICD-10-CM: I21.4  ICD-9-CM: 410.70  8/27/2022 Yes       Review of Systems:   A comprehensive review of systems was negative except for that written in the HPI. Vital Signs:    Last 24hrs VS reviewed since prior progress note. Most recent are:  Visit Vitals  BP 97/65 (BP 1 Location: Left upper arm, BP Patient Position: At rest;Sitting)   Pulse (!) 109   Temp 98.5 °F (36.9 °C)   Resp 18   Ht 5' 8\" (1.727 m)   Wt 46.3 kg (102 lb)   SpO2 95%   BMI 15.51 kg/m²       No intake or output data in the 24 hours ending 09/05/22 1400       Physical Examination:     I had a face to face encounter with this patient and independently examined them on 9/5/2022 as outlined below:          Constitutional:  No acute distress, cooperative, pleasant, chronically ill appearing, cachectic    ENT:  Oral mucosa moist, oropharynx benign. Resp:  Course bilaterally, decreased at bases R>L. No wheezing/rhonchi/rales. No accessory muscle use. CV:  Regular rhythm, normal rate, no murmurs, gallops, rubs     GI:  Soft, non distended, non tender. normoactive bowel sounds, no hepatosplenomegaly     Musculoskeletal:  No edema, warm, 2+ pulses throughout     Neurologic:  Moves all extremities.   AAOx3, CN II-XII reviewed            Data Review:    Review and/or order of clinical lab test  Review and/or order of tests in the radiology section of CPT  Review and/or order of tests in the medicine section of CPT      Labs:     No results for input(s): WBC, HGB, HCT, PLT, HGBEXT, HCTEXT, PLTEXT, HGBEXT, HCTEXT, PLTEXT in the last 72 hours. No results for input(s): NA, K, CL, CO2, BUN, CREA, GLU, CA, MG, PHOS, URICA in the last 72 hours. No results for input(s): ALT, AP, TBIL, TBILI, TP, ALB, GLOB, GGT, AML, LPSE in the last 72 hours. No lab exists for component: SGOT, GPT, AMYP, HLPSE    No results for input(s): INR, PTP, APTT, INREXT, INREXT in the last 72 hours. No results for input(s): FE, TIBC, PSAT, FERR in the last 72 hours. Lab Results   Component Value Date/Time    Folate 11.1 08/28/2022 05:23 AM        No results for input(s): PH, PCO2, PO2 in the last 72 hours. No results for input(s): CPK, CKNDX, TROIQ in the last 72 hours.     No lab exists for component: CPKMB  No results found for: CHOL, CHOLX, CHLST, CHOLV, HDL, HDLP, LDL, LDLC, DLDLP, TGLX, TRIGL, TRIGP, CHHD, CHHDX  No results found for: GLUCPOC  No results found for: COLOR, APPRN, SPGRU, REFSG, IRINA, PROTU, GLUCU, KETU, BILU, UROU, ROLANDO, LEUKU, GLUKE, EPSU, BACTU, WBCU, RBCU, CASTS, UCRY      Medications Reviewed:     Current Facility-Administered Medications   Medication Dose Route Frequency    albuterol-ipratropium (DUO-NEB) 2.5 MG-0.5 MG/3 ML  3 mL Nebulization BID RT    naloxone (NARCAN) injection 0.4 mg  0.4 mg IntraVENous EVERY 2 MINUTES AS NEEDED    oxyCODONE ER (OxyCONTIN) tablet 10 mg  10 mg Oral DAILY    oxyCODONE IR (ROXICODONE) tablet 5 mg  5 mg Oral Q3H PRN    oxyCODONE IR (ROXICODONE) tablet 10 mg  10 mg Oral Q3H PRN    pantoprazole (PROTONIX) tablet 40 mg  40 mg Oral ACB    acetaminophen (TYLENOL) tablet 650 mg  650 mg Oral TID    senna-docusate (PERICOLACE) 8.6-50 mg per tablet 2 Tablet  2 Tablet Oral DAILY    aspirin delayed-release tablet 81 mg  81 mg Oral 7am    sodium chloride (NS) flush 5-40 mL  5-40 mL IntraVENous Q8H    sodium chloride (NS) flush 5-40 mL  5-40 mL IntraVENous PRN    acetaminophen (TYLENOL) tablet 650 mg  650 mg Oral Q6H PRN    Or    acetaminophen (TYLENOL) suppository 650 mg  650 mg Rectal Q6H PRN    polyethylene glycol (MIRALAX) packet 17 g  17 g Oral DAILY PRN    ondansetron (ZOFRAN ODT) tablet 4 mg  4 mg Oral Q8H PRN    Or    ondansetron (ZOFRAN) injection 4 mg  4 mg IntraVENous Q6H PRN    L.acidophilus-paracasei-S.thermophil-bifidobacter (RISAQUAD) 8 billion cell capsule  1 Capsule Oral DAILY    metoprolol tartrate (LOPRESSOR) tablet 25 mg  25 mg Oral Q12H    enoxaparin (LOVENOX) injection 30 mg  30 mg SubCUTAneous Q24H    [Held by provider] lactated Ringers infusion  100 mL/hr IntraVENous CONTINUOUS    nitroglycerin (NITROSTAT) tablet 0.4 mg  0.4 mg SubLINGual Q5MIN PRN    morphine injection 2 mg  2 mg IntraVENous Q4H PRN     ______________________________________________________________________  EXPECTED LENGTH OF STAY: 3d 16h  ACTUAL LENGTH OF STAY:          9                 Silvana Sigala MD

## 2022-09-05 NOTE — PROGRESS NOTES
Care Management:    Transition of Care Plan:     RUR: 15%  Disposition: meets criteria for hospice  Transportation: TBD    CM received call from Dr. Sadaf Franks. Patient is medically stable for discharge at this time. Reviewed chart. Patient with lung cancer and bone mets. Hospice has been consulted patient does not meet criteria for general inpatient hospice (GIB) or for [facility] (?[facility]). Patient currently has [employer] through his employer. Per chart review, patient lived with his brother who has cancer and patient was his caregiver. Per chart, patient does not have resources to pay out of pocket. Earlier this stay plan was for patient to go to his daughter's home with home health. Patient now with cancer diagnosis. Daughter told CM yesterday she feels patient needs are beyond her ability to care for patient and she is not emotionally ready to accept him. CM met with patient and daughter in patient's room. She brought papers from patient's home. She showed them to CM. 1-Patient's last paycheck from his employer is 7-17-22. Patient said he is officially \"laid off\" from his employer since then. 2- One set up papers (3 pages) was from [place]. They paper was written 8-17-22 and asked him to respond by 8-27-22. One page told him how to log in to create an account. One page stated that they have information that he may not be able to return to his current type of employment () and asked that an MD complete the form and send it back by 8-27-22. He stated he did not complete as he was admitted to hospital.  3- The last set up papers (3 pages) were for MD to complete regarding disability. The forms did not state they were from [employer] office, but patient thinks they are since he has an appointment with them set up for 10-21-22 to apply for disability. Explained what each of the papers were for.  Suggested that daughter work on unemployment first as that would be the quickest turn around time for a source of income. Patient has not had any income since his last check on 7-17-22. Patient applied for SNAP benefits and was approved. Patient does not have any savings, penitentiary, [employer], or other financial resources. When CM asked about property, the daughter stated that she did not wish to continue the conversation any longer. Recommended daughter apply for Medicaid for patient since at this time he has not income. Updated MD. Will give handoff to unit CM and CM management tomorrow morning.     EUSEBIO Grier

## 2022-09-05 NOTE — HOSPICE
[facility]  [phone]    Patient Name: Javan Simmons  YOB: 1960  Age: 58 y.o. [address] [facility] RN Note:  Chart reviewed. Plan of care discussed with patients nurse. There was no family present at bedside. Allowed patient to vent his frustrations and provided active listening. He states he knows he can't go home without a caregiver. Messages left for dtr Christine Berrios to return call. Pt receiving many doses of IV pain medication and states it does not provide relief. We will continue to assess daily for changes and inpatient appropriateness. Consents are signed but not dated and are in hospice office. Discussed with Dr. Lidya Jensen, [facility] for Metastatic Lung Cancer.      Lucia Alberto, [facility]  [phone]

## 2022-09-05 NOTE — PROGRESS NOTES
Patient refusing to turn to assess bottom. Encouraged patient to turn to offload bottom. Patient refusing oral pain medications, says the only thing that is even helping \"a little bit\" is the IV morphine.

## 2022-09-05 NOTE — PROGRESS NOTES
RN encouraged patient to be repositioned twice this shift. Patient refused. RN educated patient regarding pressure ulcers. Patient continued to refuse and didn't want to be repositioned even after being given pain medication.

## 2022-09-06 NOTE — HOSPICE
[facility]  [phone]    Social Work Admission Note  Patient Name: Marisol Hernández  YOB: 1960  Age: 58 y.o. Date of Visit: 09/06/22  Facility of Care: [facility]  Patient Room: 547/02     Hospice Attending: Heron Duvall MD  Hospice Diagnosis: NSTEMI (non-ST elevated myocardial infarction) ([facility] [address] [I21.4]    Level of Care:    [x]  GIP    []  Respite   []  Routine    Consents/NCD Documentation:     Consents Reviewed:   []  Yes  [x]  No, completed by other hospice staff member. Person Reviewed/Signed with:  []  Patient   []  Pts NOK/MPOA  Name:     Right to NCD Reviewed:   []  Yes  []  No, completed by other hospice staff member. NCD Requested:   []  Yes  []  No    Admission Nurse/Intake Notified NCD was requested:  []  Yes  []  No  []  Not requested    Planned Start of Care Date:     Hospice Witness Representative:    CHIP   LCSW met with pt at bedside shortly after arrival to [facility]. Team got him settled and comfortable. He was seen by MD and orders were adjusted. LCSW asked how pt was doing and he stated he was ok, but did appear drowsy. LCSW encouraged him to rest, but shared that SW would follow and visit again tomorrow. LCSW will follow up with daughter in the coming days regarding planning for final arrangements as she plans to discuss with family and pt tonight. She had initially planned to take pt home and care for him. LCSW will follow closely for additional needs during stay at [facility].     ADVANCE CARE PLANNING    Advance Directive:  []  Yes  [x]  No   []  Would like to complete  Primary MPOA:  Secondary MPOA:   LNOK: DaughterEula  Code Status: DNR  Durable DNR: _ Yes  _ No  Advance Care Planning 4/25/2022   Confirm Advance Directive None   Patient Would Like to Complete Advance Directive No   Does the patient have other document types (No Data)       Relationship Status:  []  Single     []        []      []  Domestic Partner     [] /  []  Common Law  []    []  Unknown    If in a relationship, name of partner/spouse:  Duration of relationship:    Cheondoism/Spirituality: NO PREFERENCE  []  Active In Cheondoism/Spirituality   []  Not Active   []  N/A  Notes:     Home: TBD, daughter speaking with family tonight  Resources Provided:  []  [facility]  []  Information on applying for disability  []  FMLA Paperwork  []  Letters Requested by [facility]   []  [address]  []  Final Arrangements Resources   []  Outside counseling services (individual or group therapy)  []  Grief resources   []  Tylor No  []  [address]   []  [address]   []  [employer]   []  Referral Sent to [employer]   []  Referral Sent to Music Therapy   []  Referral Sent to Pet Therapy  []  Other  Social Work Initial Assessment     Sex:  male    Pronoun Preference:   [x]  He/Him/His   []  She/Her/Hers   []  They/Them/Theirs   []   Patient Name   []   Decline to Answer  []   Unknown  []   Other   Notes:     Race/Ethnicity: (erica all that apply)  []  [place] [facility] or [place] Native  []    []  Black or [place] American  []   or   []  [facility] or [facility]  [x]  White  []  Unknown  []  Other     Inpatient Financial Agreement Completed:   [x]  Yes  []  No    Insurance:   [x]  Medicare   []  Medicaid     []  [employer]    []  Self-Pay/Uninsured   Notes:      Has pt applied for FAP? []  Needs to Apply  []  Application Completed and Submitted   []  Approved/ Expiration Date:   [x]  N/A  Notes:     Has pt applied for Medicaid? []  Needs to Apply  []  Application Completed and Submitted/Application Number:   [x]  N/A  Notes:     Has a long term care screening ([facility]) been requested? []  Requested   []  Not Requested, Needs follow up  []  Completed  [x]  N/A  Notes:     Does the pt have a long term care insurance policy?   []  Yes  [x]  No  []  Yes, Needing assistance with paperwork  Notes:      Service:    []  Yes   [x]  No       []  Unknown    Appropriate for Pinning Ceremony:   []  Yes      [x]  No    Is patient using VA benefits? []  Yes      [x]  No  []  Needs assistance with accessing benefits  Notes:       Work History:   []  Full-Time/Part-Time  []  Retired   []  Other  Notes:     Primary Language: English  []   Needed  []   utilized during visit  []   Waived/ form completed    Ability to express thoughts/needs/feelings  [x]  Expressed thoughts/feelings/needs without difficulty  []  Requires extra time and cuing  []  Speech limited single words  []  Uses only gestures (eye, blinking eye or head movement/pointing)  []  Unable to express thoughts/feelings/needs (speech unintelligible or inappropriate)  []  Unresponsive  Notes:      Mental Status:  [x]  Alert-oriented to:     [x]  Person     [x]  Place     [x]  Time  []  Comatose-responds to:    []   Verbal stimuli    []  Tactile stimuli    []  Painful stimuli  []  Forgetful  []  Disoriented/Confused  []  Lethargic  []  Agitated  []  Unresponsive  []  Other (specify):    Notes:      Patients description of Illness/Current Health Status:    []  Patient unable to discuss  []  Patient unwilling to discuss  []  (Specify)    Pt aware he is nearing EOL    Knowledge/Understanding of Disease Process  Patient:    [x]  Demonstrates knowledge/understanding of disease process   [x]  Demonstrates knowledge/understanding of treatment plan   [x]  Demonstrates knowledge/understanding of prognosis   [x]  Demonstrates acceptance of prognosis   []  Demonstrates knowledge/understanding of resuscitation status   []  Other (specify)  Caregiver:   []  Demonstrates knowledge/understanding of disease process   []  Demonstrates knowledge/understanding of treatment plan   []  Demonstrates knowledge/understanding of prognosis   []  Demonstrates acceptance of prognosis   []  Demonstrates knowledge/understanding of resuscitation status   []  Other (specify)  Notes:      Patients living arrangement/care setting:  Use the PRIOR COLUMN when the PATIENTS current health status necessitated a change in his/her primary residence. Prior Current Response              []             []    Patients own home/residence              []             []    Home of family member/friend              []             []    Boarding home/Group Home              []             []    Assisted living facility/half-way center              []             []    Hospital/Acute care facility              []             []    Skilled nursing facility              []             []    Long term care facility/Nursing home              []             [x]    Hospice in Patient      Primary Caregiver:  []  No Primary Caregiver  Name of Primary Caregiver: Lydia Martin  Primary Caregiver Phone Number: [phone]  Relationship or Primary Caregiver:    []  Spouse/Significant other       [x]  Child        []  Step child       []  Sibling   []  Parent   []  Friend/Neighbor   []  Community/Latter-day Volunteer   []  Paid help   []  Other (specify):___________  Notes:       Family members/Significant others:  Name:  Relationship:  Phone Number:  Actively involved in care? []  Yes  []  No    Name:  Relationship:  Phone Number:  Actively involved in care?   []  Yes  []  No    Social support systems: (select ONE best description)  []  Excellent social support system which includes three or more family members or friends  [x]  Good social support system which includes two or less members or friends  []  [address] support which includes one family member or friend  []  Poor social support; no family members or friends; basically ALONE  Notes:      Emotional Status: (erica all that apply)    Patient Caregiver Response                 [x]                []    Mood/Affect stable and appropriate                   []                []    Angry                 [] []    Anxious                 []                []    Apprehensive                 []                []    Avoidant                 []                []    Clinging                 []                []    Depressed                 []                []    Distraught                 []                []    Fearful                 []                []    Flat Affect                 []                []    Helpless                 []                []    Hostile                 []                []    Impulsive                 []                []    Irritable                 []                []    Labile                 []                []    Manic                 []                []    Restlessness                 []                []    Sad                 []                []    Strain/Stress                 []                []    Suspicious                 []                []     Tearful                 []                 []     Withdrawn          Notes:     Coping Skills (strengths/weakness):    Patient: Coping Skills (strength/weakness):    Family/caregiver (strength/weakness):     [place] of care upon discharge (erica all that apply):     []  No burden evident   [x]  Family must administer medications   []  Illness causing financial strain   [x]  Family/Support feels overwhelmed   []  Family/Support sleep disturbed with patients care   []  Patients care causes extra physical stress  of death  []  Illness causes changes in family lifestyle  []  Illness impacting family/support employment  []  Family experiencing increased time demands  []  Patients behavior endangers family  []  Denial of patients illness  []  Concern over outcome of illness/fear  []  Patients behavior embarrassing to family   Notes:      Risk Factors: (erica all that apply):    [x]  No burden evident   []  Alcohol abuse  []  Financial resources inadequate to meet basic needs (food/house/etc)  []  Financial resources inadequate to meet health care needs (supplies/equipment/medications)  []  Food/nutrition resources inadequate  []  Home environment unsafe/inadequate for home care  []  Homicidal risk  []  Lives alone or without concerned relatives  []  Multiple medications/complex schedule  []  Physical limitations increase likelihood of falls  []  Plan of care/treatments complicated  []  Substance use/abuse  []  Suicidal risk  []  Visual impairment threatens safety/ability to perform self-care  []  Other (specify):     Abuse/Neglect (actual/potential risks):  [x]  No signs of abuse/neglect  []  History of abuse/neglect                 []  Physical          []  Sexual  []  History of domestic violence  []  Lacks adequate physical care  []  Lacks emotional nurturing/support  []  Lacks appropriate stimulation/cognitive experiences  []  Left alone inappropriately  []  Lacks necessary supervision  []  Inadequate or delayed medical care  []  Unsafe environment (i.e guns/drug use/history of violence in the home/etc.)  []  Bruising or other physical signs of injury present  []  Refer to child/adult protective services  []  Other (specify):   Notes:      Current Sources of Stress (in Addition to Current Illness):   [x]  None reported  []  Bills/Debt    []  Career/Job change    []   (short term)    []   (long term)    []  Death of a child (recent)    []  Death of a parent (recent)   []  Death of a spouse (recent)   []  Employment status changed   []  Family discord    []  Financial loss/Inadequate inther (specify):come  []  Job loss  []  Legal issues unresolved  []  Lifestyle change  []  Marital discord  []  Marriage within the last year  []  Paperwork (insurance/legal/etc) overwhelming  []  Separation/Divorce  []  Other (specify):  Notes:       Interventions/Plan of Care   [x]  MSW will assess social and emotional factors related to coping with end of life issues  [x]  MSW will provide community resource planning/referral   [x]  MSW to assist with relocation to different care setting if/when symptoms stabilize  [x]  Pt/Pts family will receive emotional support. []  Pt/Pts family will share the details surrounding the pts disease progression  []  Pt/Pts Family will show expression of grief by participating in life review. []  Pt/Pts Family will be educated and able to verbalize understanding of mental, emotional, and physical symptoms of grief. []  Pt/Pts Family will be educated and able to identify skills and social support to help cope with grief. []  Pts family will receive support for grief with emphasis on developmentally appropriate language.   [] Other:   Notes:       Discharge Planning   []  Home with family member    []  Return back to nursing home/facility  [x]  Needs assistance with placement/paid caregivers  []  Short Stay under routine level of care at [facility]   []  Other  Notes:     MSW Assessment Completed by: Oralia Jovel  09/06/22    Time In:1245       Time Out:115

## 2022-09-06 NOTE — HOSPICE
1300-pt arrived to [facility] via ambulance transport; pt being admitted to [facility] from [facility]; pt is GIP for IV management of pain and dyspnea; pt walked with standby assistance from the stretcher to the bed; pt alert and oriented x4 with clear and appropriate conversation ; pt on 5L 02 nasal canula and dyspnic at rest and with ambulation; pt rated pain 8/10 in his back and left shoulder; assessment completed; Dr Je Suarez at the bedside to assess patient; pt oriented to [facility] and to hospice; pt verbalizes understanding that he does not have much time left; pt wishes to receive EOL care here at [facility]; pt has family support but does not pull from any Christianity or spiritual group; pt confirms that he is a DNR  1340-scheduled morphine administered by Tenisha Vallejo; lunch provided--pt ate 25%  1425-pt asleep in bed; respirations non-labored; facial expression neutral  1535-pt reports that the morphine decreased his pain to a 6/10, which he reports is acceptable; pt requested help to lay back in the bed; respirations even, regular and unlabored  1629-pt asleep in bed; resting quietly without any signs of discomfort; respirations unlabored   1714-pt attempted to ambulate to the bathroom with 2 person assistance, but pt was unable to due to weakness and fatigue; pt voided 175 dark rajni urine using the urinal; pt not ready for dinner  1823-dinner warmed for pt--he only ate bites; pt appears dyspneic with labored breathing; when questioned about his breathing, pt denied difficulties; pt rates pain 8/10 in his neck and back  1835-scheduled morphine administered  1900-Report given to KEELY Montes    NAME OF PATIENT:  Javan Iris    LEVEL OF CARE:  GIP    REASON FOR GIP:   Pain, despite numerous changes in medications, Unmanageable respiratory distress, Medication adjustment that must be monitored 24/7, and Stabilizing treatment that cannot take place at home    *PATIENT REMAINS ELIGIBLE FOR GIP LEVEL OF CARE AS EVIDENCED BY: (MUST BE ADDRESSED OF PATIENT GIP)      REASON FOR RESPITE:  N/a    O2 SAFETY:  Concentrator positioning (6\" from furniture/drapes) and Oxygen sign on the door    FALL INTERVENTIONS PROVIDED:   Implemented/recommended use of non-skid footwear, Implemented/recommended use of fall risk identification flag to all team members, Implemented/recommended assistive devices and encouraged their use, Implemented/recommended resources for alarm system (personal alarm, bed alarm, call bell, etc.) , Implemented/recommended environmental changes (remove hazards, lower bed, improve lighting, etc.), and Implemented/recommended increased supervision/assistance    INTERDISPLINARY COMMUNICATION/COLLABORATION:  Physician, MSW, [place], RN, CNA, and SW    NEW MEDICATION INITIATION DOCUMENTATION:  Consulted AT MD to report change in pt status, Obtained Order from Provider for initiation of symptom relief medication /other medication needed, and Documentation completed in Clinical Note in [facility]    Reason medication is being initiated:  EOL symptoms    MD / Provider name consulted re: change in status / initiation of new medication:  Dr. Argentina Thurman Symptom(s):  EOL symptom potiential    New Order(s):  see MAR    Name of the person notified of the changes:  patient is oriented and able to receive teaching    Name of person being taught:  patient     Instructions given:  RN to administer    Side Effects taught:  yes    Response to teaching:  verbalized understanding      COMFORTABLE DYING MEASURE:  Is Patient/family satisfied with symptom level?  yes    DISCHARGE PLAN:  possible EOL at [facility]; should pt stabilize pt will be discharged home vs. Placement

## 2022-09-06 NOTE — PROGRESS NOTES
Problem: Pressure Injury - Risk of  Goal: *Prevention of pressure injury  Description: Document Vipul Scale and appropriate interventions in the flowsheet. 9/6/2022 1112 by Marylu Blackwell RN  Outcome: Resolved/Met  9/6/2022 1052 by Marylu Blackwell RN  Outcome: Progressing Towards Goal  Note: Pressure Injury Interventions:  Sensory Interventions: Float heels, Minimize linen layers, Maintain/enhance activity level         Activity Interventions: PT/OT evaluation, Pressure redistribution bed/mattress(bed type), Increase time out of bed    Mobility Interventions: PT/OT evaluation, Pressure redistribution bed/mattress (bed type)    Nutrition Interventions: Offer support with meals,snacks and hydration    Friction and Shear Interventions: Lift sheet, HOB 30 degrees or less, Minimize layers                Problem: Patient Education: Go to Patient Education Activity  Goal: Patient/Family Education  Outcome: Resolved/Met     Problem: Falls - Risk of  Goal: *Absence of Falls  Description: Document Ijeoma Fall Risk and appropriate interventions in the flowsheet.   9/6/2022 1112 by Marylu Blackwell RN  Outcome: Resolved/Met  9/6/2022 1052 by Marylu Blackwell RN  Outcome: Progressing Towards Goal  Note: Fall Risk Interventions:  Mobility Interventions: Communicate number of staff needed for ambulation/transfer, PT Consult for mobility concerns, PT Consult for assist device competence         Medication Interventions: Patient to call before getting OOB, Teach patient to arise slowly    Elimination Interventions: Call light in reach              Problem: Patient Education: Go to Patient Education Activity  Goal: Patient/Family Education  Outcome: Resolved/Met     Problem: Patient Education: Go to Patient Education Activity  Goal: Patient/Family Education  Outcome: Resolved/Met     Problem: Patient Education: Go to Patient Education Activity  Goal: Patient/Family Education  Outcome: Resolved/Met     Problem: Gas Exchange - Impaired  Goal: *Absence of hypoxia  Outcome: Resolved/Met     Problem: Patient Education: Go to Patient Education Activity  Goal: Patient/Family Education  Outcome: Resolved/Met     Problem: Discharge Planning  Goal: *Discharge to safe environment  9/6/2022 1112 by Ari Romano RN  Outcome: Resolved/Met  9/6/2022 1052 by Ari Romano RN  Outcome: Progressing Towards Goal  Goal: *Knowledge of medication management  9/6/2022 1112 by Ari Romano RN  Outcome: Resolved/Met  9/6/2022 1052 by Ari Romano RN  Outcome: Progressing Towards Goal  Goal: *Knowledge of discharge instructions  Outcome: Resolved/Met     Problem: Patient Education: Go to Patient Education Activity  Goal: Patient/Family Education  Outcome: Resolved/Met

## 2022-09-06 NOTE — PROGRESS NOTES
Problem: Falls - Risk of  Goal: *Absence of Falls  Description: Document Kartik Covarrubias Fall Risk and appropriate interventions in the flowsheet. Outcome: Progressing Towards Goal  Note: Fall Risk Interventions:  Mobility Interventions: Bed/chair exit alarm, Patient to call before getting OOB         Medication Interventions: Bed/chair exit alarm, Patient to call before getting OOB    Elimination Interventions: Bed/chair exit alarm, Call light in reach              Problem: Pain  Goal: *Control of acute pain  Outcome: Progressing Towards Goal  Note: Patient rated pain 8/10 upon arrival.  Patient's morphine dose was increased from 2 to 4mg and scheduled for every 4 hours. Patient reports that this has decreased his pain to a 6/10, which he is happy with. Continue to assess, monitor and treat per POC. Problem: Hospice Orientation  Goal: Demonstrate understanding of hospice philosophy, plan of care, and home hospice program  Description: The patient/family/caregiver will demonstrate understanding of hospice philosophy, plan of care and the home hospice program as evidenced by participation in meeting the patient's psychosocial, spiritual, medical, and physical needs inclusive of medical supplies/equipment focusing on symptoms. Outcome: Progressing Towards Goal  Note: Patient verbalized understanding of hospice philosophy and terminal prognosis and the end of treatment options. Patient reports that he understand that he only has a short time left to live and he just wants to be comfortable. Continue to assess, monitor and treat per POC.

## 2022-09-06 NOTE — HOSPICE
[facility]  [phone]     Patient Name: Milton Spencer  YOB: 1960  Age: 58 y.o. [address] [facility] RN Note:  Patient now requiring IV morphine to manage his pain. Meets criteria for [facility] hospice. I confirmed there is a bed available at [facility] today. Rapid Covid test ordered   CM please set up transport for 12 noon today. Report to be called to [facility]: [phone]  IV to be left in place and patient to be medicated with IV morphine prior to transport. Leave chest tube in place. Patient's daughter, Kathie Hernandez, made aware of plan. Will have DDNR signed by patient and placed on chart for MD signature for transport. Thank you for the opportunity to be of service to this patient.      Saud Dowell RN  Clinical Nurse Liaison   [address] [facility]  (W)[phone] (L) [phone]

## 2022-09-06 NOTE — PROGRESS NOTES
PCCM:    VSS    No recent labs    Plan:     Lung cancer with malignant pleural effusion    Chest tube accidentally pulled out     Can replace if required for symptom control    Going into hospice care  We will be available again to see if needed    Consuelo Roe PA-C

## 2022-09-06 NOTE — H&P
[facility]  [phone]    Patient Name: Zarina Orellana  YOB: 1960    Date of Provider Hospice Visit: 09/06/22    Level of Care:   [x] General Inpatient (GIP)    [] Routine   [] Respite    Current Location of Care:  [] [facility] [] [facility] [] [id number] [facility] [] [address] [place] [address] [x] Hospice House THE [facility], patient referred from:  [x] 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 year old who was admitted to [address] [place] [address]. Patient is a 71-year-old man with a history of peripheral artery disease, status post left femoral peroneal bypass who presents to the hospital with left-sided chest pain. Extensive work-up done in the hospital and patient was discovered to have extensive lung cancer with hilar lymphadenopathy, widely metastatic bony disease, malignant pleural effusion on the right. He underwent IR guided biopsy of scapula on 8/29 which was consistent with adenocarcinoma. Other imaging showed malignancy originating from the right lung with extensive evidence of metastatic disease including diffuse bony mets, diffuse right pleural effusion, bilateral lung parenchymal disease, peritoneal nodules, adrenal metastasis, liver metastasis. Patient had chest tube placed on the right for malignant pleural effusion and this was actually removed prior to transition to the [facility]. Patient having significant symptom burden and was not tolerating oral medication. He had required at least 7 IV doses of morphine in the last 12 to 16 hours secondary to pain. Patient sent to the [facility] for GIP level care.   In talking with the patient, pain involves his right shoulder, right chest.  He did talk with his doctors and he understands that \"I do not have much time to live \". Patient initially was going to return home but given his symptom burden, he was admitted under [facility] level care. Patient states he has been eating very little as he is just not hungry. The patient's principle diagnosis has resulted in pain and shortness of breath  Refer to LCD     Functionally, the patient's Karnofsky and/or Palliative Performance Scale has declined over a period of weeks and is estimated at 20. The patient is dependent on the following ADLs: All    Objective information that support this patients limited prognosis includes:   CT imaging   IMPRESSION  No evidence of aortic dissection or pulmonary embolism. Metastatic malignancy likely originating from the right lung, with extensive  evidence of metastatic disease including diffuse bone metastases, diffuse right  pleural disease and bilateral lung parenchymal disease, peritoneal nodules,  adrenal metastases, and liver lesions. The patient/family chose comfort measures with the support of Hospice. HOSPICE DIAGNOSES   Active Symptoms:  1. Cancer associated pain  2. Shortness of breath  3. Extensive metastatic lung cancer  4. Hospice care     PLAN   Patient admitted under [facility] level care as he needs frequent nursing assessments, IV medication management as he was not tolerating sublingual/oral medication, and currently not safe to attempt to transition home given symptom burden  Based on what he has already received at the hospital, we will start morphine 4 mg IV every 4 hours scheduled every 15 minutes as needed for pain and shortness of breath  Comfort meds for all other symptoms  Plan reviewed with bedside nursing team  Plan reviewed with patient extensively. He is easily fatigued.   He does have a daughter that lives locally +2 other children that live in [place] and 1 that lives in [place]     and SW to support family needs  Disposition: Possible death at the hospice house  Hospice Plan of care was reviewed in detail and agree with current plan of care    Prognosis estimated based on 09/06/22 clinical assessment is:   [x] Hours to Days    [] Days to Weeks    [] Other:    Communicated plan of care with: Hospice Case Manager; Hospice IDT; Care Team     GOALS OF CARE     Patient/Medical POA stated Goal of Care: Hospice care    [x] I have reviewed and/or updated ACP information in the Advance Care Planning Navigator. This information is available in the [address] link in the patient's chart header. Primary Decision Maker (Health Care Agent):   Primary Decision Maker: Pj Dewitt - [phone]    Resuscitation Status: DNR  If DNR is there a Durable DNR on file? : [x] Yes [] No (If no, complete Durable DNR)    HISTORY     History obtained from: Chart, patient, hospice liaison    CHIEF COMPLAINT: Shortness of breath  The patient is:   [x] Verbal  [] Nonverbal  [] Unresponsive    HPI/SUBJECTIVE: Patient is able to answer most questions. He fatigues very easily. Describes pain in the right side of his chest, right shoulder       REVIEW OF SYSTEMS     The following systems were: [x] reviewed  [] unable to be reviewed    Positive ROS include:  Constitutional: fatigue, weakness, in pain, short of breath  Ears/nose/mouth/throat: increased airway secretions  Respiratory:shortness of breath, wheezing  Gastrointestinal:poor appetite, nausea, vomiting, abdominal pain, constipation, diarrhea  Musculoskeletal:pain, deformities, swelling legs  Neurologic:confusion, hallucinations, weakness  Psychiatric:anxiety, feeling depressed, poor sleep  Endocrine:     Can rate his pain to be as high as 8 out of 10    Adult Non-Verbal Pain Assessment Score:      Face  [] 0   No particular expression or smile  [] 1   Occasional grimace, tearing, frowning, wrinkled forehead  [] 2   Frequent grimace, tearing, frowning, wrinkled forehead    Activity (movement)  [] 0   Lying quietly, normal position  [] 1   Seeking attention through movement or slow, cautious movement  [] 2   Restless, excessive activity and/or withdrawal reflexes    Guarding  [] 0   Lying quietly, no positioning of hands over areas of body  [] 1   Splinting areas of the body, tense  [] 2   Rigid, stiff    Physiology (vital signs)  [] 0   Stable vital signs  [] 1   Change in any of the following: SBP > 20mm Hg; HR > 20/minute  [] 2   Change in any of the following: SBP > 30mm Hg; HR > 25/minute    Respiratory  [] 0   Baseline RR/SpO2, compliant with ventilator  [] 1   RR > 10 above baseline, or 5% drop SpO2, mild asynchrony with ventilator  [] 2   RR > 20 above baseline, or 10% drop SpO2, asynchrony with ventilator     FUNCTIONAL ASSESSMENT     Palliative Performance Scale (PPS): 20       PSYCHOSOCIAL/SPIRITUAL ASSESSMENT     Active Problems:    * No active hospital problems. *    Past Medical History:   Diagnosis Date    Arthritis     GERD (gastroesophageal reflux disease)       Past Surgical History:   Procedure Laterality Date    HX APPENDECTOMY      AS CHILD    HX ENDOSCOPY      1990's    HX VASCULAR STENT      X3 STENTS GROIN    HX WISDOM TEETH EXTRACTION      ALL TEETH REMOVED      Social History     Tobacco Use    Smoking status: Every Day     Packs/day: 0.50     Years: 40.00     Pack years: 20.00     Types: Cigarettes    Smokeless tobacco: Never   Substance Use Topics    Alcohol use:  Yes     Alcohol/week: 2.0 standard drinks     Types: 2 Cans of beer per week     Comment: DAILY     Family History   Problem Relation Age of Onset    Heart Disease Mother         HEART ATTACK     Emphysema Father     No Known Problems Sister     COPD Brother     Lung Disease Brother     Other Brother         MOTOR VEHICLE ACCIDENT    No Known Problems Daughter     No Known Problems Daughter     No Known Problems Daughter     No Known Problems Son     Mann aH Problems Neg Hx       No Known Allergies   Current Facility-Administered Medications   Medication Dose Route Frequency    bisacodyL (DULCOLAX) suppository 10 mg  10 mg Rectal DAILY PRN    morphine injection 4 mg  4 mg IntraVENous Q4H    morphine injection 4 mg  4 mg IntraVENous Q15MIN PRN    midazolam (VERSED) injection 2 mg  2 mg IntraVENous Q2H PRN    glycopyrrolate (ROBINUL) injection 0.2 mg  0.2 mg IntraVENous Q4H PRN    ketorolac (TORADOL) injection 30 mg  30 mg IntraVENous Q6H PRN    acetaminophen (TYLENOL) suppository 650 mg  650 mg Rectal Q4H PRN        PHYSICAL EXAM     Wt Readings from Last 3 Encounters:   08/29/22 46.3 kg (102 lb)   04/28/22 52.4 kg (115 lb 8.3 oz)   04/25/22 52.4 kg (115 lb 8.3 oz)       There were no vitals taken for this visit.     Supplemental O2  [x] Yes  [] NO  Last bowel movement:     Currently this patient has:  [x] Peripheral IV [] PICC  [] PORT [] ICD    [] Mccauley Catheter [] NG Tube   [] PEG Tube    [] Rectal Tube [] Drain  [] Other:     Constitutional: Very thin, frail, appears older than stated age, alert and oriented, dyspnea with talking or movement  Eyes: Reactive  ENMT: Slightly dry  Cardiovascular: Slightly tachycardic, no significant murmur  Respiratory: Minimal to no breath sounds on the right, prior chest tube site is covered, slight increased work of breathing  Gastrointestinal: Soft, thin  Musculoskeletal: Muscle wasting  Skin: Unremarkable  Neurologic: Nonfocal  Psychiatric: Calm  Other:       Pertinent Lab and or Imaging Tests:  Lab Results   Component Value Date/Time    Sodium 132 (L) 09/01/2022 04:22 AM    Potassium 4.5 09/01/2022 04:22 AM    Chloride 98 09/01/2022 04:22 AM    CO2 27 09/01/2022 04:22 AM    Anion gap 7 09/01/2022 04:22 AM    Glucose 120 (H) 09/01/2022 04:22 AM    BUN 12 09/01/2022 04:22 AM    Creatinine 0.58 (L) 09/01/2022 04:22 AM    BUN/Creatinine ratio 21 (H) 09/01/2022 04:22 AM    GFR est AA >60 09/01/2022 04:22 AM    GFR est non-AA >60 09/01/2022 04:22 AM    Calcium 8.7 09/01/2022 04:22 AM     Lab Results   Component Value Date/Time    Protein, total 6.4 09/01/2022 08:53 AM    Albumin 2.7 (L) 08/27/2022 04:39 PM           Total time:   Counseling / coordination time:   > 50% counseling / coordination?:

## 2022-09-06 NOTE — PROGRESS NOTES
Report called and given to Jamari Courtney [facility] CHEMICAL DEPENDENCY [facility] RN at 11 AM.

## 2022-09-06 NOTE — PROGRESS NOTES
2000 Received patient from  [facility]. Shift Summary: pt refused turning. 0730 Bedside shift change report given to Tylor Escobar RN (oncoming nurse) by Jerrold Felty, RN. Report included the following information SBAR, Kardex, MAR, and Recent Results. Problem: Pressure Injury - Risk of  Goal: *Prevention of pressure injury  Description: Document Vipul Scale and appropriate interventions in the flowsheet. Outcome: Progressing Towards Goal  Note: Pressure Injury Interventions:  Sensory Interventions: Pressure redistribution bed/mattress (bed type), Minimize linen layers, Maintain/enhance activity level         Activity Interventions: PT/OT evaluation    Mobility Interventions: PT/OT evaluation    Nutrition Interventions: Offer support with meals,snacks and hydration    Friction and Shear Interventions: Lift sheet                Problem: Falls - Risk of  Goal: *Absence of Falls  Description: Document Ijeoma Fall Risk and appropriate interventions in the flowsheet.   Outcome: Progressing Towards Goal  Note: Fall Risk Interventions:  Mobility Interventions: Communicate number of staff needed for ambulation/transfer         Medication Interventions: Patient to call before getting OOB    Elimination Interventions: Call light in reach              Problem: Discharge Planning  Goal: *Discharge to safe environment  Outcome: Progressing Towards Goal  Goal: *Knowledge of medication management  Outcome: Progressing Towards Goal  Goal: *Knowledge of discharge instructions  Outcome: Progressing Towards Goal

## 2022-09-06 NOTE — DISCHARGE SUMMARY
Discharge Summary       PATIENT ID: Serena Bellamy  MRN: [id number]   YOB: 1960    DATE OF ADMISSION: 8/27/2022  6:17 PM    DATE OF DISCHARGE: 9/6/22   PRIMARY CARE PROVIDER: None     ATTENDING PHYSICIAN: Tere Jimenes  DISCHARGING PROVIDER: Gabriel Tidwell MD    To contact this individual call [phone] and ask the  to page. If unavailable ask to be transferred the Adult Hospitalist Department. CONSULTATIONS: IP CONSULT TO PULMONOLOGY  IP CONSULT TO HEMATOLOGY  IP CONSULT TO PALLIATIVE CARE - PROVIDER  IP CONSULT TO RADIATION ONCOLOGY    PROCEDURES/SURGERIES: * No surgery found *    DISCHARGE DIAGNOSES:  SCLC    This 70-year-old man with past medical history significant for peripheral artery disease, status post left femoral/peroneal bypass presented at the emergency room with left shoulder pain. This started about 3 weeks ago. The patient also complained of left-sided chest pain. It is not clear whether the left shoulder pain is as a result of radiation from the left-sided chest pain. The pain is constant sharp pain, 9/10 in severity, worse with breathing. No known relieving factors. The patient also complained of left leg pain. He stated that he has had left leg pain since after his surgery which was in April. The patient also stated that he has been losing weight which was unintentional.  Overall, the patient stated that he feels weak and it is becoming difficult to carry out activity of daily living.        [address] [facility],[address] COURSE:   R lung mass with hilar LAD, and widely metastatic bony disease  Acute hypoxic respiratory failure  Pleural effusions - suspect malignant   - s/p IR guided biopsy of scapula 8/29  - Oncology and pulmonary consulted and following  - pain control with Oxycodone, and decadron    - Status postthoracentesis currently plan is to keep the thoracentesis drain in place      LEFT Shoulder pain - secondary to bony metastasis likely (in glenoid, scapula)  - IV Morphine   - PO oxycodone PRN   - Added Decadron per palliative  - Rad Onc initiated palliative radiation to shoulder      NSTEMI -   -Heparin gtt was stopped   -Echo 55-60%   -Conservative Rx given the advanced neoplastic process      PVD - s/p L fem-perineal bypass   - Contiue ASA     Hyponatremia - mild, monitor   Tobacco abuse  disorder      DISCHARGE DIAGNOSES / PLAN:      D/c to hospice    BMI: Body mass index is 15.51 kg/m². . This patient: Has a BMI within normal limits. PENDING TEST RESULTS:   At the time of discharge the following test results are still pending:      ADDITIONAL CARE RECOMMENDATIONS:        NOTIFY YOUR PHYSICIAN FOR ANY OF THE FOLLOWING:   Fever over 101 degrees for 24 hours. Chest pain, shortness of breath, fever, chills, nausea, vomiting, diarrhea, change in mentation, falling, weakness, bleeding. Severe pain or pain not relieved by medications, as well as any other signs or symptoms that you may have questions about.     FOLLOW UP APPOINTMENTS:    Follow-up Information       Follow up With Specialties Details Why Contact Info    None    None ([phone]) Patient stated that they have no PCP                 DIET: Regular Diet    ACTIVITY: Activity as tolerated    EQUIPMENT needed:     DISCHARGE MEDICATIONS:  Discharge Medication List as of 9/6/2022 11:16 AM        STOP taking these medications       aspirin delayed-release 81 mg tablet Comments:   Reason for Stopping:               DISPOSITION:    Home With:   OT  PT  HH  RN       Long term SNF/Inpatient Rehab    Independent/assisted living   x Hospice    Other:       PATIENT CONDITION AT DISCHARGE:     Functional status   x Poor     Deconditioned     Independent      Cognition    x Lucid     Forgetful     Dementia      Catheters/lines (plus indication)    Mccauley     PICC     PEG    x None      Code status     Full code    x DNR      PHYSICAL EXAMINATION AT DISCHARGE:  General:          Alert, cooperative, no distress, appears stated age. HEENT:           Atraumatic, anicteric sclerae, pink conjunctivae                          No oral ulcers, mucosa moist, throat clear, dentition fair  Neck:               Supple, symmetrical  Lungs:             Clear to auscultation bilaterally. No Wheezing or Rhonchi. No rales. Chest wall:      No tenderness  No Accessory muscle use. Heart:              Regular  rhythm,  No  murmur   No edema  Abdomen:        Soft, non-tender. Not distended. Bowel sounds normal  Extremities:     No cyanosis. No clubbing,                            Skin turgor normal, Capillary refill normal  Skin:                Not pale. Not Jaundiced  No rashes   Psych:             Not anxious or agitated.   Neurologic:      Alert, moves all extremities, answers questions appropriately and responds to commands       CHRONIC MEDICAL DIAGNOSES:  Problem List as of 9/6/2022 Date Reviewed: 8/28/2022            Codes Class Noted - Resolved    Severe protein-calorie malnutrition ([facility] ICD-10-CM: M12  ICD-9-CM: 262  8/29/2022 - Present        * (Principal) NSTEMI (non-ST elevated myocardial infarction) ([facility] ICD-10-CM: I21.4  ICD-9-CM: 410.70  8/27/2022 - Present        Atherosclerotic PVD with intermittent claudication ([facility] ICD-10-CM: I71.497  ICD-9-CM: 440.21  4/28/2022 - Present           Greater than 30  minutes were spent with the patient on counseling and coordination of care    Signed:   Hany Rivera MD  9/6/2022  2:20 PM

## 2022-09-06 NOTE — HOSPICE
[facility]  [phone]    Inpatient Nursing Admission   Patient Name: Marisol Hernández  YOB: 1960  Age: 58 y.o. Date of Hospice Admission: 9/6/2022  Hospice Attending Elected by Patient: Dorethea Hatchet, MD  Primary Care Physician: None  Admitting RN: Rach Zimmer  : Rosalia Ott    Level of Care (GIP/Routine/Respite): [facility]  Facility of Care: [facility]  Patient Room: 14/01     HOSPICE SUMMARY   ER Visits/ Hospitalizations in past year: unknown  Hospice Diagnosis: metastatic lung cancer  Onset Date of Hospice Diagnosis: August 2022  Summary of Disease Progression Leading to Hospice Diagnosis: Patient is a 49-year-old man with a history of peripheral artery disease, status post left femoral peroneal bypass who presents to the hospital with left-sided chest pain. Extensive work-up done in the hospital and patient was discovered to have extensive lung cancer with hilar lymphadenopathy, widely metastatic bony disease, malignant pleural effusion on the right. He underwent IR guided biopsy of scapula on 8/29 which was consistent with adenocarcinoma. Other imaging showed malignancy originating from the right lung with extensive evidence of metastatic disease including diffuse bony mets, diffuse right pleural effusion, bilateral lung parenchymal disease, peritoneal nodules, adrenal metastasis, liver metastasis. Patient had chest tube placed on the right for malignant pleural effusion and this was actually removed prior to transition to the [facility]. Patient having significant symptom burden and was not tolerating oral medication. He had required at least 7 IV doses of morphine in the last 12 to 16 hours secondary to pain. Patient sent to the [facility] for GIP level care.   In talking with the patient, pain involves his right shoulder, right chest.  He did talk with his doctors and he understands that \"I do not have much time to live \". Patient initially was going to return home but given his symptom burden, he was admitted under [facility] level care. Patient states he has been eating very little as he is just not hungry. Diagnoses RELATED to the terminal prognosis:   Active Symptoms:  1. Cancer associated pain  2. Shortness of breath  3. Extensive metastatic lung cancer  4. Hospice care      Patient Active Problem List   Diagnosis Code    Atherosclerotic PVD with intermittent claudication (HCC) I70.219    NSTEMI (non-ST elevated myocardial infarction) ([facility] [address] I21.4    Severe protein-calorie malnutrition ([facility] [address] E43       Rationale for a prognosis of life expectancy of 6 months or less if the disease follows its normal course (Disease Specific History):     Mukund Thayer is a 58 y.o. who was admitted to [facility]. The patient's principle diagnosis of metastatic lung cancer has resulted in:  1. Cancer associated pain  2. Shortness of breath  3. Extensive metastatic lung cancer  4. Hospice care. Functionally, the patient's Palliative Performance Scale has declined over a period of 2 weeks and is estimated at 20. Objective information that support this patients limited prognosis includes: CT imaging--  No evidence of aortic dissection or pulmonary embolism. Metastatic malignancy likely originating from the right lung, with extensive  evidence of metastatic disease including diffuse bone metastases, diffuse right  pleural disease and bilateral lung parenchymal disease, peritoneal nodules,  adrenal metastases, and liver lesions. The patient/family chose comfort measures with the support of Hospice.     Patient meets for [facility] LOC as evidenced by pain and dyspnea requiring IV management, frequent nursing assessment and titration of medications     Prognosis estimated based on 09/06/22 clinical assessment is:   [] Few to Many Hours  [x] Hours to Days   [] Few to Many Days   [] Days to Weeks   [] Few to Many Weeks [] Weeks to Months   [] Few to Many Months    ASSESSMENT    Patient self-reports:  [x]  Yes    [] No    SYMPTOMS: pain and dyspnea    SIGNS/PHYSICAL FINDINGS: continuous pain, dyspnea with rest and on exertion, severe fatigue, anorexia, weakness     FAST for all dementia:      Learning Assessment:  Patient  Is patient willing/able to learn? yes  What is the highest level of education completed? unknown  Learning preference (written material, demonstration, visual)? verbal  Learning barriers (ESOL, [place], poor vision)? none    Caregiver  Is caregiver willing to learn care for patient?no   What is the highest level of education completed? High school   Learning preference (written material, demonstration, visual)? Written/verbal  Learning barriers (ESOL, [place], poor vision)? none    CLINICAL INFORMATION     Wt Readings from Last 3 Encounters:   08/29/22 46.3 kg (102 lb)   04/28/22 52.4 kg (115 lb 8.3 oz)   04/25/22 52.4 kg (115 lb 8.3 oz)     Ht Readings from Last 3 Encounters:   08/29/22 5' 8\" (1.727 m)   04/29/22 5' 8\" (1.727 m)   04/25/22 5' 8\" (1.727 m)     There is no height or weight on file to calculate BMI. Visit Vitals  /80 (BP 1 Location: Right arm, BP Patient Position: At rest)   Pulse (!) 110   Temp 98.3 °F (36.8 °C)   Resp 15   SpO2 96%       LAB VALUES  No results found for this visit on 09/06/22 (from the past 12 hour(s)). No results found for this visit on 09/06/22 (from the past 6 hour(s)). Lab Results   Component Value Date/Time    Protein, total 6.4 09/01/2022 08:53 AM    Albumin 2.7 (L) 08/27/2022 04:39 PM       Currently this patient has:  [x] Supplemental O2 [x] Peripheral IV  [] PICC    [] PORT   [] [place] Catheter [] NG Tube   [] PEG Tube [] Ostomy    [] AICD: Has ICD been deactivated?   [] Yes [] No:______    PLAN     Admit to [facility] level of care for dyspnea, pain, fatigue, weakness, anorexia   Morphine 4mg every 4 hours, 4mg morphine [id number] for pain and air hunger  versed 2mg every 2 hours PRN for anxiety and air hunger  robinol 0.2mg every 4 hours prn for secretions   toradol 30mg every 6 hours prn fevers  6. Infection control and prevention as needed: n/a  6. Provide support/education to caregiver/family: Renate Forde  7. Monitor closely for changes in symptoms  8. Provide support and frequent rounds for patient comfort and safety   9. Maintain skin integrity as tolerated for hospice patient, turning and repositioning for comfort  10. Provide  and  for patient and family support  6. Continue to discuss discharge plan for any changes    Hospice Team Frequency Orders:  Skilled Nurse - Daily x 14 days with 5 PRN visits for symptom control. MSW - 1 x weekly with 5 visits PRN family support and need for volunteer services.  - 1 x weekly with 5 visits PRN spiritual support. CNA - daily x 14 days    ADVANCE CARE PLANNING (Complete in ACP Flow Sheet)   Code Status: DNR  Durable DNR: []  Yes  [x]  No  Code Status Discussed/Confirmed: yes  Preference for Other Life Sustaining Treatment Discussed/Confirmed: yes  Hospitalization Preference:  [facility] Planning 2022   Confirm Advance Directive None   Patient Would Like to Complete Advance Directive No   Does the patient have other document types (No Data)        Service: [] Yes  [x]  No      [] Unknown  Appropriate for Pinning Ceremony:  [] Yes     [] No  Worship: NO PREFERENCE   Home: TBD    DISCHARGE PLANNING     1. Discharge Plan: possible death at [facility]  2. Patient/Family teaching: disease process, EOL symptoms, process and management   3.  Response to patient/family teaching: verbalized understanding     SOCIAL/EMOTIONAL/SPIRITUAL NEEDS     Spiritual Issues Identified: none reported--pt does not have a dee dee background or community    Psych/ Social/ Emotional Issues Identified: provide support to patient and family on EOL processes     Caregiver Support:  [x] Provided information on End of Life Care   [] Material Provided: [facility] [employer] [facility] or [facility] [employer]   Dr. Rodriguez Owens contacted, discharge to hospice order received  Dr. Ted Louis contacted, agrees to serve as attending provider for hospice and provided verbal certification of terminal illness with life expectancy of 6 months or less. Orders for hospice admission, medications and plan of treatment received. Medication reconciliation completed.   MEDS: See medication list below  DME: Per [facility]  Supplies: Per [facility]  IDT communication to include MD, , SW, CH and support team    ALLERGIES AND MEDICATIONS     Allergies: No Known Allergies  Current Facility-Administered Medications   Medication Dose Route Frequency    bisacodyL (DULCOLAX) suppository 10 mg  10 mg Rectal DAILY PRN    morphine injection 4 mg  4 mg IntraVENous Q4H    morphine injection 4 mg  4 mg IntraVENous Q15MIN PRN    midazolam (VERSED) injection 2 mg  2 mg IntraVENous Q2H PRN    glycopyrrolate (ROBINUL) injection 0.2 mg  0.2 mg IntraVENous Q4H PRN    ketorolac (TORADOL) injection 30 mg  30 mg IntraVENous Q6H PRN    acetaminophen (TYLENOL) suppository 650 mg  650 mg Rectal Q4H PRN

## 2022-09-06 NOTE — HOSPICE
[facility]  [phone]     Patient Name: Zarina Orellana  YOB: 1960  Age: 58 y.o. [facility] [place] RN Note: Bedside assessement. Patient very weak, sitting on edge of bed. He accidentally pulled out chest tube when he sat up. Floor nurse made aware. No drainage noted, suture in place, nurse covered with DSD. We will not have it replaced at this time. DDNR signed by patient and on chart for MD signature. Nurse to get Rapid covid ASAP  Transport set for 12 noon. Morphine IV changed to hourly PRN, He will get a dose now. CTI Dr Rossana Dean for metastatic lung cancer    Thank you for the opportunity to be of service to this patient.      Graham Blackmon RN  Clinical Nurse Liaison   [facility] [place]  (E)[phone] (V) [phone]

## 2022-09-06 NOTE — PROGRESS NOTES
Transition of Care Plan  RUR- Low 15%  DISPOSITION: The [facility] [place]   Report to be called to [facility]: [phone]  F/U with PCP/Specialist    Transport: [facility] 12pm   Pending: Rapid COVID Test   This cm attempted to provide the pt's daughter, Benedetta Severs [phone]  with an update regarding dispo-No response     CM: [address]. MSW,   [phone]

## 2022-09-06 NOTE — HOSPICE
[facility]  [phone]     Patient Name: Siddharth De Dios  YOB: 1960  Age: 58 y.o. [facility] RN Note:    Rapid COVID was indeterminate. Order written to repeat. CM to move transport to 1pm pending test results. Thank you for the opportunity to be of service to this patient.      Ron Hodge RN  Clinical Nurse Liaison   [facility]  O)[phone] (R) [phone]

## 2022-09-06 NOTE — PROGRESS NOTES
Problem: Pressure Injury - Risk of  Goal: *Prevention of pressure injury  Description: Document Vipul Scale and appropriate interventions in the flowsheet. Outcome: Progressing Towards Goal  Note: Pressure Injury Interventions:  Sensory Interventions: Float heels, Minimize linen layers, Maintain/enhance activity level         Activity Interventions: PT/OT evaluation, Pressure redistribution bed/mattress(bed type), Increase time out of bed    Mobility Interventions: PT/OT evaluation, Pressure redistribution bed/mattress (bed type)    Nutrition Interventions: Offer support with meals,snacks and hydration    Friction and Shear Interventions: Lift sheet, HOB 30 degrees or less, Minimize layers                Problem: Falls - Risk of  Goal: *Absence of Falls  Description: Document Ijeoma Fall Risk and appropriate interventions in the flowsheet.   Outcome: Progressing Towards Goal  Note: Fall Risk Interventions:  Mobility Interventions: Communicate number of staff needed for ambulation/transfer, PT Consult for mobility concerns, PT Consult for assist device competence         Medication Interventions: Patient to call before getting OOB, Teach patient to arise slowly    Elimination Interventions: Call light in reach              Problem: Discharge Planning  Goal: *Discharge to safe environment  Outcome: Progressing Towards Goal  Goal: *Knowledge of medication management  Outcome: Progressing Towards Goal

## 2022-09-06 NOTE — HOSPICE
Mariajose Grace going to [facility] for GIP LOC  PATEINT NAME   Mariajose Grace       1960   DATE NEEDED   2022   HOSPICE DX AND ADMIT DATE   [facility]   TRANSPORTATION COMPANY      TRANSPORTATION TIME   12: 00. Was moved up from 1 pm   LEVEL OF CARE GIP    VISITATION AGREEMENT AND INPATIENT AGREEMENT COMPLETED? HOME HOSPICE TEAM   N/A   WHO ORDERED RESPITE/GIP STAY? Dr. Yoel Connelly: [employer] [employer]   Daughter Henrik Rosales is working on  arrangements, she will talk to pt and her mother tonight. SW can you please f/u with resources? NEXT OF [facility] AND PHONE NUMBER Melina Rushing ( [phone]), she works from home 8 am - 5 pm, will come up after work. FAMILY REASON/DYNAMICS   Daughter had thought she could take him home, she is very overwhelmed. She is the sole caretaker. Pts lives with his brother who is on home hospice for COPD. Pt was his brother's caregiver.

## 2022-09-07 NOTE — PROGRESS NOTES
1900 Report received from [place], [address] Patient rates pain to back, neck, and L shoulder 8/10. Repositioned with [place], CNA. PRN morphine administered. Shift assessment complete, see flowsheet. Patient's daughter at bedside. 2010 patient rates pain 6/10, declines another PRN dose of morphine. 2100 Patient resting in bed, eyes closed, respirations unlabored. 2150 Scheduled morphine administered. Patient rates pain 6/10.  2250 Patient resting in bed, eyes closed, respirations unlabored. 2350 Patient resting in bed, eyes closed, respirations unlabored. Patient declines PRN pain medication, denies needs at this time. 0045 Patient resting in bed, eyes closed, respirations unlabored. 0140 Patient declined scheduled morphine. Repositioned with KEELY Henson. Patient denies needs at this time. 0240 Patient resting in bed. He complains of mild pain to L shoulder, declines pain medication. Repositioned with a pillow under L arm. Patient states he is comfortable, denies further needs. 0340 Patient resting in bed, eyes closed, respirations unlabored. 0865 Patient c/o generalized pain. PRN morphine administered. 0458 Patient resting in bed, eyes closed, respirations unlabored. 0515 Patient resting in bed, eyes closed, respirations unlabored. 1537 Scheduled morphine administered. Patient reports consistent pain to his shoulder. Patient repositioned in bed w/ TREVER Jordan.         NAME OF PATIENT:  Dutch Ynag    LEVEL OF CARE:  [facility]    REASON FOR GIP:   Pain, despite numerous changes in medications, Unmanageable respiratory distress, Medication adjustment that must be monitored 24/7, and Stabilizing treatment that cannot take place at home    *PATIENT REMAINS ELIGIBLE FOR [facility] LEVEL OF CARE AS EVIDENCED BY: (MUST BE ADDRESSED OF PATIENT GIP): frequent RN assessment/care, need for IV PRN medications      REASON FOR RESPITE:  N/A    O2 SAFETY:  Concentrator positioning (6\" from furniture/drapes) and Oxygen sign on the door    FALL INTERVENTIONS PROVIDED:   Implemented/recommended use of non-skid footwear, Implemented/recommended use of fall risk identification flag to all team members, Implemented/recommended resources for alarm system (personal alarm, bed alarm, call bell, etc.) , Implemented/recommended environmental changes (remove hazards, lower bed, improve lighting, etc.), and Implemented/recommended increased supervision/assistance    INTERDISPLINARY COMMUNICATION/COLLABORATION:  Physician, EUSEBIO, Lizette Pelayo, and RN, CNA    NEW MEDICATION INITIATION DOCUMENTATION:  N/A    Reason medication is being initiated:  N/A    MD / Provider name consulted re: change in status / initiation of new medication:  N/A    New Symptom(s):  N/A    New Order(s):  N/A    Name of the person notified of the changes:  N/A    Name of person being taught:  N/A    Instructions given:  N/A    Side Effects taught:  N/A    Response to teaching:  N/A      COMFORTABLE DYING MEASURE:  Is Patient/family satisfied with symptom level?  yes    DISCHARGE PLAN:  Home vs. EOL

## 2022-09-07 NOTE — HOSPICE
This was an initial visit to assess needs and offer support. Mr Ileana Samson appeared to be sleeping and did not respond to  in the room. Offered blessing for peace and well being for him and his daughter. I will reach out to her via phone.

## 2022-09-07 NOTE — ADT AUTH CERT NOTES
Pleural Effusion - Care Day 10 (9/5/2022) by Radha Todd       Review Status Review Entered   Completed 9/6/2022 14:39      Criteria Review      Care Day: 10 Care Date: 9/5/2022 Level of Care: Inpatient Floor    Guideline Day 2    Level Of Care    (X) Floor    9/6/2022 14:39:52 EDT by Radha Todd      IP/Medical    Clinical Status    ( ) * Hemodynamic stability    9/6/2022 14:39:52 EDT by Radah Todd      WI:   BP: 97/65   T: 98.9 °F  RR: 18 SpO2: 93 % NC 6L/min    (X) * Respiratory distress absent    9/6/2022 14:39:52 EDT by Radha Todd      Resp:  Course bilaterally, decreased at bases R>L. No wheezing/rhonchi/rales. No accessory muscle use. Activity    (X) Activity as tolerated    9/6/2022 14:39:52 EDT by Tigre Mckeon - ACTIVITY ALLOWED (PT/OT)    Routes    (X) Oral hydration    (X) Oral medications    9/6/2022 14:39:52 EDT by Radha Todd      ASA 81mg Daily PO, Risaquad 1capsule Daily PO, Protonix 40mg Daily before breakfast PO, Pericolace 8.6-50mg Daily PO    (X) Usual diet    9/6/2022 14:39:52 EDT by Radha Todd      Regular Diet    Interventions    ( ) Possibly discontinue chest tube    9/6/2022 14:39:52 EDT by Radha Todd      Chest tube on gravity    Medications    (X) Oral or parenteral analgesics    9/6/2022 14:39:52 EDT by Radha Todd      Oral: Tylenol 650mg TID PO x1  Parenteral: Morphine 2mg Q4 PRN IV x4    * Milestone   Additional Notes    DATE: 09/05      Pertinent Updates:   -Patient refusing oral pain medications, says the only thing that is even helping \"a little bit\" is the IV morphine.    -thoracentesis drain in place   -Significant dispo problem due to no source of pain event and daughter does not want to take patient home      Physical Exam:   CV: Regular rhythm, normal rate, no murmurs, gallops, rubs    Musculoskeletal: No edema, warm, 2+ pulses throughout    Neurologic: Moves all extremities.   AAOx3, CN II-XII reviewed         MD Consults/Assessments & Plans:   **Hospitalist Notes**   Assessment & Plan:   R lung mass with hilar LAD, and widely metastatic bony disease   Acute hypoxic respiratory failure   Pleural effusions - suspect malignant    -Oncology and pulmonary consulted and following          L Shoulder pain - secondary to bony metastasis likely (in glenoid, scapula)   - IV Morphine    - PO oxycodone PRN, decadron discontinued   - Rad Onc initiated palliative radiation to shoulder        NSTEMI -    -Conservative Rx given the advanced neoplastic process        PVD - s/p L fem-perineal bypass    - Contiue ASA       Hyponatremia - mild, monitor    Tobacco abuse  disorder           Medications:   -Duo-neb 3mL BID x1   -Lovenox 30 mg Q24 SC      CM Assessments or Notes:   -Plan is home with hospice; referral sent awaiting response    -Recommended daughter apply for Medicaid for patient since at this time he has not income. Pleural Effusion - Care Day 9 (9/4/2022) by Libby Isaacs       Review Status Review Entered   Completed 9/6/2022 13:53      Criteria Review      Care Day: 9 Care Date: 9/4/2022 Level of Care: Inpatient Floor    Guideline Day 2    Level Of Care    (X) Floor    9/6/2022 13:52:18 EDT by Libby Isaacs      IP/Medical    Clinical Status    ( ) * Hemodynamic stability    9/6/2022 13:52:18 EDT by Libby Isaacs      OK: 107-113  BP: 104/70   T: 98 °F  RR: 18 SpO2: 95 % NC 6L/min    (X) * Respiratory distress absent    9/6/2022 13:52:18 EDT by Libby Isaacs      Lungs: Chest excursion, Auscultation B/L  symmetrical, Chest tube with serosanguinous drain noted.     Activity    (X) Activity as tolerated    9/6/2022 13:52:18 EDT by Viktor Mitchell - ACTIVITY ALLOWED (PT/OT)    Routes    (X) Oral hydration    (X) Oral medications    9/6/2022 13:52:18 EDT by Libby Isaacs      Risaquad 1capsule Daily PO, Lopressor 25mg Q12 PO, Protonix 40mg Daily before breakfast PO, Pericolace 8.6-50mg Daily PO (X) Usual diet    9/6/2022 13:52:18 EDT by Ly Echols      Diet: Regular    Interventions    ( ) Possibly discontinue chest tube    9/6/2022 13:52:18 EDT by Ly Echols      Chest tube on gravity w/ Drainage level at 1750 ml    Medications    (X) Oral or parenteral analgesics    9/6/2022 13:52:18 EDT by Ly Echols      Oral: Tylenol 650mg TID PO x2    Parenteral: Morphine 2mg Q4 PRN IV x4    * Milestone   Additional Notes    DATE: 09/04      Pertinent Updates:   -currently MD plan is to keep the thoracentesis drain in place   -The plan for home is scratched, his daughter is unable to care for him. MD recommends hospice home, CM following         Physical Exam:   Deneral: Emaciated chronically ill appearing man lying in bed in no distress        CVS:    Regular rate and rhythm    no  murmur,    No click, rub or gallop   S1 normal    S2 normal    Pedal pulses  b/l symmetrical   Abdomen:        Extremities:    No cyanosis, jaundice   No edema noted          MD Consults/Assessments & Plans:         **Hospitalist Notes**   Assessment & Plan:   R lung mass with hilar LAD, and widely metastatic bony disease   Acute hypoxic respiratory failure   Pleural effusions - suspect malignant    -Oncology and pulmonary consulted and following          L Shoulder pain - secondary to bony metastasis likely (in glenoid, scapula)   - IV Morphine    - PO oxycodone PRN, decadron discontinued   - Rad Onc initiated palliative radiation to shoulder        NSTEMI -    -Conservative Rx given the advanced neoplastic process        PVD - s/p L fem-perineal bypass    - Contiue ASA       Hyponatremia - mild, monitor    Tobacco abuse  disorder        Medications:   -Duo-neb 3mL BID x1   -Lovenox 30 mg Q24 SC         CM Assessments or Notes:   MARISSA - Hospice informed daughter is no longer able to take patient home will need additional caregiver support.          Pleural Effusion - Care Day 8 (9/3/2022) by Ly Echols       Review Status Review Entered   Completed 9/6/2022 13:28      Criteria Review      Care Day: 8 Care Date: 9/3/2022 Level of Care: Inpatient Floor    Guideline Day 2    Level Of Care    (X) Floor    9/6/2022 13:28:41 EDT by Suzy Batista      IP/Medical    Clinical Status    ( ) * Hemodynamic stability    9/6/2022 13:28:41 EDT by Suzy Batista      AL:  BP: 96/65    T: 98.3 °F  RR: 19 SpO2: 94% NC 4-5L/min    (X) * Respiratory distress absent    9/6/2022 13:28:41 EDT by Suzy Batista      Lungs: Chest excursion symmetrical   Auscultation B/L Symmetrical   Chest tube with serosanguinous drain noted.     Activity    (X) Activity as tolerated    9/6/2022 13:28:41 EDT by Kenton Garza - ACTIVITY ALLOWED (PT/OT)    Routes    (X) Oral hydration    (X) Oral medications    9/6/2022 13:28:41 EDT by Suzy Batista      Protonix 40mg Daily PO    (X) Usual diet    9/6/2022 13:28:41 EDT by Suzy Batista      DIET Regular    Interventions    ( ) Possibly discontinue chest tube    9/6/2022 13:28:41 EDT by Suzy Batista      Chest tube in place    Medications    (X) Oral or parenteral analgesics    9/6/2022 13:28:41 EDT by Suzy Batista      Oral: Oxycodone 10mg Q3 PRN PO x1 then 10mg Daily PO  Parenteral: Morphine 2mg Q4 PRN IV x2    * Milestone   Additional Notes    DATE: 09/03      Pertinent Updates:   -Patient verbalized \"I feel tired \"   -Respiratory ROS: Eupneic, + cough and SOB ; on Nebulization BID, received Zofran PRN today   -MD plan is to keep the thoracentesis drain in place over the weekend and then clamp it and send patient home with hospice on Monday         Physical Exam:   General ROS: positive for  - fatigue and weight loss   CVS:    Regular rate and rhythm    no  murmur,    No click, rub or gallop   S1 normal    S2 normal    Pedal pulses  b/l symmetrical   No dyspnea/Exertions       Extremities:    No cyanosis, jaundice   No edema noted       MD Consults/Assessments & Plans: **Hospitalist Notes**   Assessment & Plan:   R lung mass with hilar LAD, and widely metastatic bony disease   Acute hypoxic respiratory failure   Pleural effusions - suspect malignant    -Oncology and pulmonary consulted and following       L Shoulder pain - secondary to bony metastasis likely (in glenoid, scapula)   - IV Morphine    - PO oxycodone PRN    - Added decadron per palliative   - Rad Onc initiated palliative radiation to shoulder        NSTEMI -    -Heparin gtt was stopped    -Conservative Rx given the advanced neoplastic process        PVD - s/p L fem-perineal bypass    - Contiue ASA       Hyponatremia - mild, monitor    Tobacco abuse  disorder           Medications:   -Duo-neb 3mL BID x1   -Lovenox 30 mg Q24 SC   -Zofran 4mg Q6 PRN IV x2                 Pleural Effusion - Care Day 7 (9/2/2022) by Jose Nelson       Review Status Review Entered   Completed 9/6/2022 13:00      Criteria Review      Care Day: 7 Care Date: 9/2/2022 Level of Care: Inpatient Floor    Guideline Day 2    Level Of Care    (X) Floor    9/6/2022 13:00:34 EDT by Jose Nelson      IP/Medical    Clinical Status    ( ) * Hemodynamic stability    9/6/2022 13:00:34 EDT by Jose Nelson      NV: 88- 118 BP: 101/68     T: 98.5 °F  RR: 30 SpO2: 93% on NC 6L/min    ( ) * Respiratory distress absent    9/6/2022 13:00:34 EDT by Jose Nelson      Dyspnea stable per MD , RR: 20,22,30 ; chest tube in place    Activity    (X) Activity as tolerated    9/6/2022 13:00:34 EDT by Genia Montano - ACTIVITY ALLOWED (PT/OT)    Routes    (X) Oral hydration    (X) Oral medications    9/6/2022 13:00:34 EDT by Jose Nelson      ASA 81mg Daily PO,Risaquad 1cap Daily PO, Lopressor 25mg q12 PO x1, Protonix 40mg Daily PO, Decadron 4mg QD PO    (X) Usual diet    9/6/2022 13:00:34 EDT by Jose Nelson      DIET Regular    Interventions    ( ) Possibly discontinue chest tube    9/6/2022 13:00:34 EDT by Jose Nelson      Pleural Chest tube (Right)    Medications    (X) Oral or parenteral analgesics    9/6/2022 13:00:34 EDT by Jennifer Fortune      Oral: Tylenol 650mg TID PO, Oxycodone 10mg Q3 PRN PO x1 then 10mg Daily PO    * Milestone   Additional Notes    DATE: 09/02      Pertinent Updates:   -chest tube in place with bloody drainage; MD would like chest tube to remain draining until tomorrow and then could be clamped for evaluation placement of pleural drain on Sunday,  IR stated that they do not place drains over the weekend or on a holiday.   -1750 ml output since placement, Effusion was lymphocytic exudate per Pulmo   -Patient received radiation treatment number 4 of 4 to the LT shoulder, LT pelvis, and RT iliac today. Physical Exam:   Constitutional: No acute distress, cooperative, pleasant, chronically ill appearing, cachectic    Resp: Course bilaterally, decreased at bases R>L. No wheezing/rhonchi/rales. No accessory muscle use. CV: Regular rhythm, normal rate, no murmurs, gallops, rubs    Musculoskeletal: No edema, warm, 2+ pulses throughout         MD Consults/Assessments & Plans:   **Pulmonary Notes**   Impression   Right lung mass with hilar mass/lymphadenopathy. New since April CXR. Right pleural effusion with pleural masses. Nicotine dependence. NSTEMI   Dyspnea      Plan   Bronchodilators   O2 titration above 90%    Would allow for effusion to drain. Can pull chest tube once effusion is less than 150ml output in 24 hour period.  He may require a pleural catheter    Chest tube on gravity    PRN over the weekend          **Hospitalist Notes**   Assessment & Plan:   R lung mass with hilar LAD, and widely metastatic bony disease   Acute hypoxic respiratory failure   Pleural effusions - suspect malignant    - s/p IR guided biopsy of scapula 8/29   --Status postthoracentesis currently plan is to keep the thoracentesis drain in place over the weekend and then clamp it and send patient home with hospice on Monday L Shoulder pain - secondary to bony metastasis likely (in glenoid, scapula)   - oxycodone PRN    - Added decadron per palliative   - Rad Onc initiated palliative radiation to shoulder    - Palliative care following, appreciate assistance        Elevated troponin -    --Status post RRT troponin trending down  Given presence of malignancy and location suspect CP and shoulder pain related to above. - DC heparin gtt    - Echo 55-60%        PVD - s/p L fem-perineal bypass    - Contiue ASA       Hyponatremia - mild, monitor    Tobacco abuse - current smoker. Declined nicotine patch        Code status: DNR   Prophylaxis: add lovenox          Medications:   -Lovenox 30 mg Q24 SC         PT/OT Assessments or Notes:   **PT**   Patient overall SBA-CGA for all mobility, completing bed mobility with SBA, transfers with CGA, and ambulating 20 feet in room with CGA and demonstrating increased trunk sway but no LOB. Patient with stable SPO2 at rest and during activity on 6LPM supplemental O2, and BP stable as well. At this time, patient is unsure of mobility goals in setting of possibly transitioning to hospice/ comfort care. At this time patient is agreeable to continuing PT interventions until he makes a decision. Per patient request, PT frequency 3x/week rather than recommended 5x/week to improve ability to participate. Patient educated on role of PT and that if his goals of care change, PT can be discontinued       Functional Outcome Measure: The patient scored Total Score: 22/28 on the Tinetti outcome measure which is indicative of moderate fall risk. Other factors to consider: possibly transitioning to hospice; metastatic cancer        PLAN : Patient will be followed by physical therapy:  3 times a week to address goals. **OT**   Will attempt back later today v beginning of next week as able. Encourage the patient to continue to participate in safe OOB activities and self-care.

## 2022-09-07 NOTE — PROGRESS NOTES
Problem: Pressure Injury - Risk of  Goal: *Prevention of pressure injury  Description: Document Vipul Scale and appropriate interventions in the flowsheet. Outcome: Progressing Towards Goal  Note: Pressure Injury Interventions:  Sensory Interventions: Assess changes in LOC, Float heels, Keep linens dry and wrinkle-free, Minimize linen layers, Pressure redistribution bed/mattress (bed type)    Moisture Interventions: Absorbent underpads, Minimize layers    Activity Interventions: Pressure redistribution bed/mattress(bed type)    Mobility Interventions: Float heels, HOB 30 degrees or less, Pressure redistribution bed/mattress (bed type)    Nutrition Interventions: Document food/fluid/supplement intake, Offer support with meals,snacks and hydration    Friction and Shear Interventions: HOB 30 degrees or less, Minimize layers         Problem: Patient Education: Go to Patient Education Activity  Goal: Patient/Family Education  Outcome: Progressing Towards Goal     Problem: Falls - Risk of  Goal: *Absence of Falls  Description: Document Ijeoma Fall Risk and appropriate interventions in the flowsheet.   Outcome: Progressing Towards Goal  Note: Fall Risk Interventions:  Mobility Interventions: Bed/chair exit alarm, Communicate number of staff needed for ambulation/transfer, Patient to call before getting OOB, Utilize walker, cane, or other assistive device    Medication Interventions: Bed/chair exit alarm, Patient to call before getting OOB, Teach patient to arise slowly    Elimination Interventions: Bed/chair exit alarm, Call light in reach, Patient to call for help with toileting needs, Urinal in reach       Problem: Patient Education: Go to Patient Education Activity  Goal: Patient/Family Education  Outcome: Progressing Towards Goal     Problem: Dyspnea Due to End of Life  Goal: Demonstrate understanding of and ability to manage respiratory symptoms at end of life  Outcome: Progressing Towards Goal     Problem: Hospice Orientation  Goal: Demonstrate understanding of hospice philosophy, plan of care, and home hospice program  Description: The patient/family/caregiver will demonstrate understanding of hospice philosophy, plan of care and the home hospice program as evidenced by participation in meeting the patient's psychosocial, spiritual, medical, and physical needs inclusive of medical supplies/equipment focusing on symptoms. Outcome: Progressing Towards Goal     Problem: Alteration in Mobility  Goal: Remain as independent as possible and remain safe in environment  Description: Patient will remain as independent as possible and remain safe in their environment. Outcome: Progressing Towards Goal     Problem: Pain  Goal: Assess satisfaction of level of comfort and symptom control  Outcome: Progressing Towards Goal  Goal: *Control of acute pain  Outcome: Progressing Towards Goal     Problem: Anxiety/Agitation  Goal: Verbalize or staff assess the ability to manage anxiety  Description: The patient/family/caregiver will verbalize and demonstrate ability to manage the patient's anxiety throughout hospice care.   Outcome: Progressing Towards Goal     Problem: Breathing Pattern - Ineffective  Goal: *Use of effective breathing techniques  Outcome: Progressing Towards Goal

## 2022-09-07 NOTE — PROGRESS NOTES
NAME OF PATIENT:  Srinivasa Jessica     LEVEL OF CARE:  GIP     REASON FOR GIP:   Pain, despite numerous changes in medications, Unmanageable respiratory distress, Medication adjustment that must be monitored 24/7, and Stabilizing treatment that cannot take place at home     *PATIENT REMAINS ELIGIBLE FOR [facility] LEVEL OF CARE AS EVIDENCED BY: (MUST BE ADDRESSED OF PATIENT GIP): frequent RN assessment/care, need for IV PRN medications        REASON FOR RESPITE:  N/A     O2 SAFETY:  Concentrator positioning (6\" from furniture/drapes) and Oxygen sign on the door     FALL INTERVENTIONS PROVIDED:   Implemented/recommended use of non-skid footwear, Implemented/recommended use of fall risk identification flag to all team members, Implemented/recommended resources for alarm system (personal alarm, bed alarm, call bell, etc.) , Implemented/recommended environmental changes (remove hazards, lower bed, improve lighting, etc.), and Implemented/recommended increased supervision/assistance     INTERDISPLINARY COMMUNICATION/COLLABORATION:  Physician, EUSEBIO, Jaxson Mcdonough, and RN, CNA     NEW MEDICATION INITIATION DOCUMENTATION:  N/A     Reason medication is being initiated:  N/A     MD / Provider name consulted re: change in status / initiation of new medication:  N/A     New Symptom(s):  N/A     New Order(s):  N/A     Name of the person notified of the changes:  N/A     Name of person being taught:  N/A     Instructions given:  N/A     Side Effects taught:  N/A     Response to teaching:  N/A        COMFORTABLE DYING MEASURE:  Is Patient/family satisfied with symptom level?  yes     DISCHARGE PLAN:  Home vs. EOL        0700- Report received from [facility]. Care Assumed. Pt is GIP LOC. Dx Lung Ca with mets. 0730- Pt is resting with eyes closed. RR tachy (26) and shallow. Opens eyes spontaneously, A/O x4. Speech is clear an appropriate. Lungs coarse and diminished bilaterally. O2 @ 5 L via NC.  Pt requested to remove and after 5 min O2 sats decreased to 83%. Pt agreed to wear O2 for comfort. Pt states that he is in pain- left shoulder/back primarily, however, he grimaces and moans when touch anywhere on his body. Urinal at bedside- pt having difficulty using due to pain. Call light in reach, will monitor. 0750- Administered Versed 2 mg and Morphine 4 mg IV prn for comfort. L Wrist IV patent and intact. L AC Mccauley #16 placed for comfort as well. 200 ml rajni urine returned. Pt stated that he \" can't remember\" the last time he had a BM and was feeling uncomfortable. 0830- Bed bath, skin care, linen change provided. Pt tolerated poorly. Became dyspnic and needed frequent rest periods. Pt turned on right side. Pt is drowsy and resting with eyes closed. No facial grimacing. RR 16 and shallow. Will monitor. 6307- PRN Dulcolax suppository 10 mg administered. 0900- Updated daughter, Paty Spicer, via phone. Still deciding on  home. 9829- Dr. Tunde Howe in the visit pt. New orders received. Pt sitting up of side of the bed stating he needs to have a BM. Assisted on the bedpan. Pt too weak to stand for a BSC.    0950- Pt had medium BM, soft form. States he \"feels much better\". Pt administered scheduled Morphine 4 mg IV after log rolling due to increased pain and dyspnea. 1029- Versed 2 mg IV administered for comfort. Increased anxiety r/t dyspnea    1130- Pt resting supine in bed with his eyes closed. RR 14, even/shallow. [id number]- Daughter, Paty Spicer, called for an update and stated she would be by later this afternoon. Pt remains resting with eyes closed. Neutral facial expression. 1230- Pt with increased RR 26 and facial grimacing. PRN Morphine 4 mg and scheduled Versed 2 mg IV adminsitered for comfort. 1330- Pt resting with eyes closed. RR even and deep. 1430- Pt confused to details. Attempted to get OOB to go to the bathroom without using call light for assistance. Reoriented and repositioned.  Pt became dyspnic and grimacing with pain after movement. 1439- Morphine 4 mg and Versed 2 mg Iv administered as scheduled. 1515- Pt resting with eyes closed, RR even and deep with periods of apnea. Friends by to [address] and Keely Esteban- did not wake pt, left a note. [address] Daughter, Amilcar Magana at bedside to visit. Updated on status and EOL plan of care. Pt will open eyes to name called and answer questions appropriately. However is lethargic and becomes dyspnic with conversation. Pt states pain is \"better\" but not able to rate at this time. No facial grimacing noted, RR even and deep. Will monitor. 1715- Pt resting with eyes closed. RR 20, shallow. Pt will remove his O2 occasionally and need it replaced. Pt took a few sips of tomato soup and water and began coughing. Able to clear, but refused any other PO. Very lethargic. Daughter and family remain at the bedside. 1810- Pt repositioned in bed with pillows. C/o of pain and became very dyspnic. Scheduled Morphine 4 mg and Versed 2 mg IV  administered. Brief changed. Bed alarm on. Will monitor. 1900- Report given to Kahlil Lincoln RN.

## 2022-09-07 NOTE — PROGRESS NOTES
[facility] Help to Those in Need  [phone]    Patient Name: Sukhjinder Rendon  YOB: 1960    Date of Provider Hospice Visit: 09/07/22    Level of Care:   [x] General Inpatient (GIP)    [] Routine   [] Respite    Current Location of Care:  [] [facility] [] [facility] [] [facility] [] [facility] [x] [facility]osp[facility], patient referred from:  [x] [facility] [] [facility] [] [facility] [] [facility] [] Home [] Other:     Date of Original Hospice Admission: 9/6/22  Hospice Medical Director at time of admission: Geodruid Diagnosis: Metastatic lung cancer  Diagnoses RELATED to the terminal prognosis: 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 year old who was admitted to [facility]. Patient is a 80-year-old man with a history of peripheral artery disease, status post left femoral peroneal bypass who presents to the hospital with left-sided chest pain. Extensive work-up done in the hospital and patient was discovered to have extensive lung cancer with hilar lymphadenopathy, widely metastatic bony disease, malignant pleural effusion on the right. He underwent IR guided biopsy of scapula on 8/29 which was consistent with adenocarcinoma. Other imaging showed malignancy originating from the right lung with extensive evidence of metastatic disease including diffuse bony mets, diffuse right pleural effusion, bilateral lung parenchymal disease, peritoneal nodules, adrenal metastasis, liver metastasis. Patient had chest tube placed on the right for malignant pleural effusion and this was actually removed prior to transition to the [facility]. Patient having significant symptom burden and was not tolerating oral medication. He had required at least 7 IV doses of morphine in the last 12 to 16 hours secondary to pain. Patient sent to the [facility] for GIP level care.   In talking with the patient, pain involves his right shoulder, right chest.  He did talk with his doctors and he understands that \"I do not have much time to live \". Patient initially was going to return home but given his symptom burden, he was admitted under GIP level care. Patient states he has been eating very little as he is just not hungry. The patient's principle diagnosis has resulted in pain and shortness of breath  Refer to LCD     Functionally, the patient's Karnofsky and/or Palliative Performance Scale has declined over a period of weeks and is estimated at 20. The patient is dependent on the following ADLs: All    Objective information that support this patients limited prognosis includes:   CT imaging   IMPRESSION  No evidence of aortic dissection or pulmonary embolism. Metastatic malignancy likely originating from the right lung, with extensive  evidence of metastatic disease including diffuse bone metastases, diffuse right  pleural disease and bilateral lung parenchymal disease, peritoneal nodules,  adrenal metastases, and liver lesions. The patient/family chose comfort measures with the support of Hospice. HOSPICE DIAGNOSES   Active Symptoms:  1. Cancer associated pain  2. Shortness of breath  3. Extensive metastatic lung cancer  4. Hospice care     PLAN   Patient will continue [facility] level care as he needs frequent nursing assessments, IV medication management, struggling with oral medication, and now showing evidence of increased anxiety, restlessness. Continue morphine at 4 mg IV every 4 hours scheduled. Patient required 3 as needed doses early this morning after he declined a scheduled dose. Clearly patient needs scheduled medication and reviewed that with him. Add Versed 2 mg IV every 4 hours scheduled every 15 minutes as needed for restlessness  Comfort meds for all other symptoms  Plan reviewed with bedside nursing team  Plan reviewed with patient. Daughter is coming to visit later today.      and SW to support family needs  Disposition: Possible death at the hospice house  Hospice Plan of care was reviewed in detail and agree with current plan of care    Prognosis estimated based on 09/07/22 clinical assessment is:   [x] Hours to Days    [] Days to Weeks    [] Other:    Communicated plan of care with: Hospice Case Manager; Hospice IDT; Care Team     GOALS OF CARE     Patient/Medical POA stated Goal of Care: Hospice care    [x] I have reviewed and/or updated ACP information in the Advance Care Planning Navigator. This information is available in the [address] link in the patient's chart header. Primary Decision Maker (Health Care Agent):   Primary Decision Maker: Mikayla Godoy - Daughter - [phone]    Resuscitation Status: DNR  If DNR is there a Durable DNR on file? : [x] Yes [] No (If no, complete Durable DNR)    HISTORY     History obtained from: Chart, patient, hospice liaison    CHIEF COMPLAINT: Shortness of breath  The patient is:   [x] Verbal  [] Nonverbal  [] Unresponsive    HPI/SUBJECTIVE: Patient is able to answer most questions. He fatigues very easily. Describes pain in the right side of his chest, right shoulder    9/7-patient appears a bit restless. Try to get out of the bed to use the bathroom. Feels more comfortable after as needed doses of morphine this morning.        REVIEW OF SYSTEMS     The following systems were: [x] reviewed  [] unable to be reviewed    Positive ROS include:  Constitutional: fatigue, weakness, in pain, short of breath  Ears/nose/mouth/throat: increased airway secretions  Respiratory:shortness of breath, wheezing  Gastrointestinal:poor appetite, nausea, vomiting, abdominal pain, constipation, diarrhea  Musculoskeletal:pain, deformities, swelling legs  Neurologic:confusion, hallucinations, weakness  Psychiatric:anxiety, feeling depressed, poor sleep  Endocrine:     Can rate his pain to be as high as 8 out of 10 but as low as 3-4 out of 10    Adult Non-Verbal Pain Assessment Score: Face  [] 0   No particular expression or smile  [] 1   Occasional grimace, tearing, frowning, wrinkled forehead  [] 2   Frequent grimace, tearing, frowning, wrinkled forehead    Activity (movement)  [] 0   Lying quietly, normal position  [] 1   Seeking attention through movement or slow, cautious movement  [] 2   Restless, excessive activity and/or withdrawal reflexes    Guarding  [] 0   Lying quietly, no positioning of hands over areas of body  [] 1   Splinting areas of the body, tense  [] 2   Rigid, stiff    Physiology (vital signs)  [] 0   Stable vital signs  [] 1   Change in any of the following: SBP > 20mm Hg; HR > 20/minute  [] 2   Change in any of the following: SBP > 30mm Hg; HR > 25/minute    Respiratory  [] 0   Baseline RR/SpO2, compliant with ventilator  [] 1   RR > 10 above baseline, or 5% drop SpO2, mild asynchrony with ventilator  [] 2   RR > 20 above baseline, or 10% drop SpO2, asynchrony with ventilator     FUNCTIONAL ASSESSMENT     Palliative Performance Scale (PPS): 20       PSYCHOSOCIAL/SPIRITUAL ASSESSMENT     Active Problems:    * No active hospital problems. *    Past Medical History:   Diagnosis Date    Arthritis     GERD (gastroesophageal reflux disease)       Past Surgical History:   Procedure Laterality Date    HX APPENDECTOMY      AS CHILD    HX ENDOSCOPY      1990's    HX VASCULAR STENT      X3 STENTS GROIN    HX WISDOM TEETH EXTRACTION      ALL TEETH REMOVED      Social History     Tobacco Use    Smoking status: Every Day     Packs/day: 0.50     Years: 40.00     Pack years: 20.00     Types: Cigarettes    Smokeless tobacco: Never   Substance Use Topics    Alcohol use:  Yes     Alcohol/week: 2.0 standard drinks     Types: 2 Cans of beer per week     Comment: DAILY     Family History   Problem Relation Age of Onset    Heart Disease Mother         HEART ATTACK     Emphysema Father     No Known Problems Sister     COPD Brother     Lung Disease Brother Other Brother         MOTOR VEHICLE ACCIDENT    No Known Problems Daughter     No Known Problems Daughter     No Known Problems Daughter     No Known Problems Son     Anesth Problems Neg Hx       No Known Allergies   Current Facility-Administered Medications   Medication Dose Route Frequency    midazolam (VERSED) injection 2 mg  2 mg IntraVENous Q4H    bisacodyL (DULCOLAX) suppository 10 mg  10 mg Rectal DAILY PRN    morphine injection 4 mg  4 mg IntraVENous Q4H    morphine injection 4 mg  4 mg IntraVENous Q15MIN PRN    midazolam (VERSED) injection 2 mg  2 mg IntraVENous Q2H PRN    glycopyrrolate (ROBINUL) injection 0.2 mg  0.2 mg IntraVENous Q4H PRN    ketorolac (TORADOL) injection 30 mg  30 mg IntraVENous Q6H PRN    acetaminophen (TYLENOL) suppository 650 mg  650 mg Rectal Q4H PRN        PHYSICAL EXAM     Wt Readings from Last 3 Encounters:   08/29/22 46.3 kg (102 lb)   04/28/22 52.4 kg (115 lb 8.3 oz)   04/25/22 52.4 kg (115 lb 8.3 oz)       Visit Vitals  /69   Pulse (!) 120   Temp 97.2 °F (36.2 °C)   Resp 16   SpO2 93%       Supplemental O2  [x] Yes  [] NO  Last bowel movement:     Currently this patient has:  [x] Peripheral IV [] PICC  [] PORT [] ICD    [] Mccauley Catheter [] NG Tube   [] PEG Tube    [] Rectal Tube [] Drain  [] Other:     Constitutional: Very thin, frail, appears older than stated age, alert and oriented, dyspnea with talking or movement, still shoulder pain, feels dyspneic  Eyes: Reactive  ENMT: Slightly dry  Cardiovascular: Slightly tachycardic, no significant murmur  Respiratory: Minimal to no breath sounds on the right, prior chest tube site is covered, slight increased work of breathing  Gastrointestinal: Soft, thin  Musculoskeletal: Muscle wasting  Skin: Unremarkable  Neurologic: Nonfocal  Psychiatric: Anxious/restless other:       Pertinent Lab and or Imaging Tests:  Lab Results   Component Value Date/Time    Sodium 132 (L) 09/01/2022 04:22 AM    Potassium 4.5 09/01/2022 04:22 AM Chloride 98 09/01/2022 04:22 AM    CO2 27 09/01/2022 04:22 AM    Anion gap 7 09/01/2022 04:22 AM    Glucose 120 (H) 09/01/2022 04:22 AM    BUN 12 09/01/2022 04:22 AM    Creatinine 0.58 (L) 09/01/2022 04:22 AM    BUN/Creatinine ratio 21 (H) 09/01/2022 04:22 AM    GFR est AA >60 09/01/2022 04:22 AM    GFR est non-AA >60 09/01/2022 04:22 AM    Calcium 8.7 09/01/2022 04:22 AM     Lab Results   Component Value Date/Time    Protein, total 6.4 09/01/2022 08:53 AM    Albumin 2.7 (L) 08/27/2022 04:39 PM           Total time:   Counseling / coordination time:   > 50% counseling / coordination?:

## 2022-09-07 NOTE — PROGRESS NOTES
Problem: Pressure Injury - Risk of  Goal: *Prevention of pressure injury  Description: Document Vipul Scale and appropriate interventions in the flowsheet. Outcome: Progressing Towards Goal  Note: Pressure Injury Interventions:  Sensory Interventions: Assess changes in LOC, Avoid rigorous massage over bony prominences, Check visual cues for pain, Float heels, Pressure redistribution bed/mattress (bed type), Keep linens dry and wrinkle-free, Minimize linen layers, Pad between skin to skin, Turn and reposition approx. every two hours (pillows and wedges if needed)    Moisture Interventions: Absorbent underpads, Internal/External urinary devices, Moisture barrier, Maintain skin hydration (lotion/cream), Minimize layers, Limit adult briefs, Apply protective barrier, creams and emollients, Check for incontinence Q2 hours and as needed    Activity Interventions: Pressure redistribution bed/mattress(bed type), Assess need for specialty bed    Mobility Interventions: Assess need for specialty bed, Float heels, HOB 30 degrees or less, Turn and reposition approx. every two hours(pillow and wedges)    Nutrition Interventions: Offer support with meals,snacks and hydration    Friction and Shear Interventions: Apply protective barrier, creams and emollients, Minimize layers, HOB 30 degrees or less                Problem: Patient Education: Go to Patient Education Activity  Goal: Patient/Family Education  Outcome: Progressing Towards Goal     Problem: Falls - Risk of  Goal: *Absence of Falls  Description: Document Ijeoma Fall Risk and appropriate interventions in the flowsheet.   Outcome: Progressing Towards Goal  Note: Fall Risk Interventions:  Mobility Interventions: Bed/chair exit alarm, Patient to call before getting OOB         Medication Interventions: Bed/chair exit alarm, Patient to call before getting OOB    Elimination Interventions: Bed/chair exit alarm, Call light in reach, Patient to call for help with toileting needs, Toileting schedule/hourly rounds              Problem: Patient Education: Go to Patient Education Activity  Goal: Patient/Family Education  Outcome: Progressing Towards Goal     Problem: Dyspnea Due to End of Life  Goal: Demonstrate understanding of and ability to manage respiratory symptoms at end of life  Outcome: Progressing Towards Goal     Problem: Hospice Orientation  Goal: Demonstrate understanding of hospice philosophy, plan of care, and home hospice program  Description: The patient/family/caregiver will demonstrate understanding of hospice philosophy, plan of care and the home hospice program as evidenced by participation in meeting the patient's psychosocial, spiritual, medical, and physical needs inclusive of medical supplies/equipment focusing on symptoms. Outcome: Progressing Towards Goal     Problem: Alteration in Mobility  Goal: Remain as independent as possible and remain safe in environment  Description: Patient will remain as independent as possible and remain safe in their environment. Outcome: Progressing Towards Goal     Problem: Pain  Goal: Assess satisfaction of level of comfort and symptom control  Outcome: Progressing Towards Goal  Goal: *Control of acute pain  Outcome: Progressing Towards Goal     Problem: Anxiety/Agitation  Goal: Verbalize or staff assess the ability to manage anxiety  Description: The patient/family/caregiver will verbalize and demonstrate ability to manage the patient's anxiety throughout hospice care.   Outcome: Progressing Towards Goal     Problem: Breathing Pattern - Ineffective  Goal: *Use of effective breathing techniques  Outcome: Progressing Towards Goal

## 2022-09-08 NOTE — HOSPICE
0845  patient's belongings sent home with his daughter. [employer]. IV sites and rogers catheter removed.

## 2022-09-08 NOTE — PROGRESS NOTES
Problem: Pressure Injury - Risk of  Goal: *Prevention of pressure injury  Description: Document Vipul Scale and appropriate interventions in the flowsheet.   Outcome: Progressing Towards Goal  Note: Pressure Injury Interventions:  Sensory Interventions: Assess changes in LOC, Check visual cues for pain, Float heels, Minimize linen layers    Moisture Interventions: Absorbent underpads, Apply protective barrier, creams and emollients, Internal/External urinary devices, Minimize layers    Activity Interventions: Pressure redistribution bed/mattress(bed type)    Mobility Interventions: Assess need for specialty bed, Float heels, HOB 30 degrees or less    Nutrition Interventions:  (npo)    Friction and Shear Interventions: Apply protective barrier, creams and emollients, Foam dressings/transparent film/skin sealants, HOB 30 degrees or less, Minimize layers                Problem: Dyspnea Due to End of Life  Goal: Demonstrate understanding of and ability to manage respiratory symptoms at end of life  Outcome: Progressing Towards Goal

## 2022-09-08 NOTE — HOSPICE
1900: report received from [facility], [address]: assessment completed, pt has several family members sitting at bedside. Pt opens eyes when his name is called but interaction is minimal as he quickly falls back asleep. Lungs are diminished. Bowel sounds are hypoactive. See flowsheets for full assessment     2022: family called out and expressed that pt appeared more dyspneic, PRN IV morphine given by Yosef Retana RN    2050: pt appears anxious and restless in bed, family agrees with RN assessment, PRN IV versed given     2120: pt appears to be resting comfortably, daughter Nayeli Vanegas remains at bedside     2152: scheduled IV morphine and versed given     [phone]: orders received from Dr. Rigoberto Aguilar to increase frequency of PRN versed to every 15 minutes as needed. 2257: PRN IV morphine and versed given for dyspnea, RR 26    2345: pt is resting quietly with unlabored respirations, RR 20    0050: pt resting with relaxed facial expressions. RR 22 but unlabored     0125: scheduled IV morphine and versed given. Respirations are labored with accessory muscle use, RR 18    0200: PRN IV versed and morphine given     0300: scheduled IV medications effective, respirations less labored. Pt now having brief periods of apnea and some sighing on expiration     0338: bilateral feet now cold and very pale. Some mottling noted on palms. Renate Vanegas at bedside    4272: pt resting quietly, continues with brief periods of apnea     0517: daughter called RN to bedside when she noticed pt stopped breathing. Pt without heart sounds and no respiratory effort after 2 full minutes of ausculation. Pt pronounced at 0519.  Daughter Nayeli Vanegas at bedside and grieving appropriately       NAME OF PATIENT:  Samantha Plenty    LEVEL OF CARE: GIP    REASON FOR GIP:   Pain, despite numerous changes in medications, Unmanageable respiratory distress, Medication adjustment that must be monitored 24/7, and Stabilizing treatment that cannot take place at home    *PATIENT REMAINS ELIGIBLE FOR GIP LEVEL OF CARE AS EVIDENCED BY: Pt requires IV medications to manage symptoms and pt also requires frequent nursing assessments       REASON FOR RESPITE:  N/a    O2 SAFETY:  Concentrator positioning (6\" from furniture/drapes) and No petroleum based products on face while oxygen in use    FALL INTERVENTIONS PROVIDED:   Implemented/recommended resources for alarm system (personal alarm, bed alarm, call bell, etc.) , Implemented/recommended environmental changes (remove hazards, lower bed, improve lighting, etc.), and Implemented/recommended increased supervision/assistance    INTERDISPLINARY COMMUNICATION/COLLABORATION:  Physician, EUSEBIO, Saravanan Smith, and RN, CNA    NEW MEDICATION INITIATION DOCUMENTATION:  N/a    Reason medication is being initiated: n/a    MD / Provider name consulted re: change in status / initiation of new medication: n/a    New Symptom(s): n/a    New Order(s): n/a    Name of the person notified of the changes: n/a    Name of person being taught: n/a    Instructions given:  n/a    Side Effects taught: n/a    Response to teaching: n/a      COMFORTABLE DYING MEASURE:  Is Patient/family satisfied with symptom level?  yes    DISCHARGE PLAN: Pt will likely  at [facility], if he stabilizes he will return home with hospice

## 2022-09-09 ENCOUNTER — HOME CARE VISIT (OUTPATIENT)
Dept: HOSPICE | Facility: HOSPICE | Age: 62
End: 2022-09-09
Payer: COMMERCIAL

## (undated) DEVICE — HANDLE LT SNAP ON ULT DURABLE LENS FOR TRUMPF ALC DISPOSABLE

## (undated) DEVICE — GLOVE ORANGE PI 7   MSG9070

## (undated) DEVICE — SET BLOOD COLL SFTY NDLE 23GA L0.75N TBNG L12IN LIGHT BLUE W

## (undated) DEVICE — SPONGE GZ W4XL4IN COT 12 PLY TYP VII WVN C FLD DSGN

## (undated) DEVICE — TOTAL TRAY, DB, 100% SILI FOLEY, 16FR 10: Brand: MEDLINE

## (undated) DEVICE — GARMENT,MEDLINE,DVT,INT,CALF,MED, GEN2: Brand: MEDLINE

## (undated) DEVICE — PENCIL SMK EVAC 10 FT BLADE ELECTRD ROCKER FOR TELSCP

## (undated) DEVICE — SUT PROL 6-0 24IN BV1 DA BLU --

## (undated) DEVICE — SUTURE VCRL SZ 3-0 L27IN ABSRB UD L26MM SH 1/2 CIR J416H

## (undated) DEVICE — SUTURE VCRL SZ 2-0 L36IN ABSRB UD L40MM CT 1/2 CIR J957H

## (undated) DEVICE — SOL INJ SOD CL 0.9% 500ML BG --

## (undated) DEVICE — SUTURE PROL SZ 5-0 L30IN NONABSORBABLE BLU L13MM RB-2 1/2 8710H

## (undated) DEVICE — Device

## (undated) DEVICE — VASCULAR-SMH: Brand: MEDLINE INDUSTRIES, INC.

## (undated) DEVICE — BAG ISOL TRNSPRT 18X18.5IN LF --

## (undated) DEVICE — SOLUTION IRRIG 1000ML STRL H2O USP PLAS POUR BTL

## (undated) DEVICE — Z DISCONTINUED USE 2425483 (LOW STOCK PER MEDLINE) TAPE UMB L18IN DIA1/8IN WHT COT NONABSORBABLE W/O NDL FOR